# Patient Record
Sex: MALE | Race: WHITE | NOT HISPANIC OR LATINO | URBAN - METROPOLITAN AREA
[De-identification: names, ages, dates, MRNs, and addresses within clinical notes are randomized per-mention and may not be internally consistent; named-entity substitution may affect disease eponyms.]

---

## 2017-01-28 ENCOUNTER — EMERGENCY (EMERGENCY)
Facility: HOSPITAL | Age: 82
LOS: 1 days | Discharge: HOME | End: 2017-01-28
Attending: EMERGENCY MEDICINE | Admitting: EMERGENCY MEDICINE
Payer: COMMERCIAL

## 2017-01-28 VITALS
SYSTOLIC BLOOD PRESSURE: 186 MMHG | TEMPERATURE: 98 F | DIASTOLIC BLOOD PRESSURE: 94 MMHG | HEART RATE: 78 BPM | RESPIRATION RATE: 16 BRPM

## 2017-01-28 VITALS
SYSTOLIC BLOOD PRESSURE: 151 MMHG | HEIGHT: 70 IN | DIASTOLIC BLOOD PRESSURE: 76 MMHG | WEIGHT: 179.9 LBS | TEMPERATURE: 98 F | HEART RATE: 78 BPM | RESPIRATION RATE: 16 BRPM | OXYGEN SATURATION: 98 %

## 2017-01-28 DIAGNOSIS — I10 ESSENTIAL (PRIMARY) HYPERTENSION: ICD-10-CM
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
## 2017-01-28 DIAGNOSIS — S20.219A CONTUSION OF UNSPECIFIED FRONT WALL OF THORAX, INITIAL ENCOUNTER: ICD-10-CM

## 2017-01-28 DIAGNOSIS — M50.30 OTHER CERVICAL DISC DEGENERATION, UNSPECIFIED CERVICAL REGION: ICD-10-CM

## 2017-01-28 DIAGNOSIS — H35.30 UNSPECIFIED MACULAR DEGENERATION: ICD-10-CM

## 2017-01-28 DIAGNOSIS — G20 PARKINSON'S DISEASE: ICD-10-CM

## 2017-01-28 PROCEDURE — 73502 X-RAY EXAM HIP UNI 2-3 VIEWS: CPT | Mod: 26

## 2017-01-28 PROCEDURE — 71250 CT THORAX DX C-: CPT | Mod: 26

## 2017-01-28 PROCEDURE — 99283 EMERGENCY DEPT VISIT LOW MDM: CPT

## 2017-01-28 PROCEDURE — 70450 CT HEAD/BRAIN W/O DYE: CPT | Mod: 26

## 2017-01-28 PROCEDURE — 72125 CT NECK SPINE W/O DYE: CPT | Mod: 26

## 2017-01-28 PROCEDURE — 73060 X-RAY EXAM OF HUMERUS: CPT | Mod: 26,LT

## 2017-01-28 RX ORDER — ACETAMINOPHEN 500 MG
650 TABLET ORAL ONCE
Qty: 0 | Refills: 0 | Status: COMPLETED | OUTPATIENT
Start: 2017-01-28 | End: 2017-01-28

## 2017-01-28 RX ORDER — CARBIDOPA AND LEVODOPA 25; 100 MG/1; MG/1
1 TABLET ORAL ONCE
Qty: 0 | Refills: 0 | Status: COMPLETED | OUTPATIENT
Start: 2017-01-28 | End: 2017-01-28

## 2017-01-28 RX ADMIN — Medication 650 MILLIGRAM(S): at 15:11

## 2017-01-28 RX ADMIN — CARBIDOPA AND LEVODOPA 1 TABLET(S): 25; 100 TABLET ORAL at 15:13

## 2017-01-28 NOTE — ED PROVIDER NOTE - OBJECTIVE STATEMENT
88 y/o M with Hx of Newinson's Dz  presents to ED for evaluation s/p fall. Family states pt was standing up from the table and fell from standing. Pt sustained a contusion on his head and shoulder which concerned his wife and prompted her to come to the ED. Pt is on ASA and plavix. Pt has no other acute complaints. Family reports pt had a similar fall 3 days ago but had no complaints and did not seek evaluation

## 2017-01-28 NOTE — ED PROVIDER NOTE - MEDICAL DECISION MAKING DETAILS
Pt is a 89 y/o M with Hx of parkinson's Dz s/p fall. Plan to CT head, neck, chest to r/o traumatic injury. Will give evening medications and tylenol for pain. If results are negative plan to D/C home Pt is a 88 y/o M with Hx of parkinson's Dz s/p fall. Plan to CT head, neck, chest, Pelvis XR to r/o traumatic injury. Will give evening medications and tylenol for pain. If results are negative plan to D/C home

## 2017-01-28 NOTE — ED ADULT NURSE REASSESSMENT NOTE - NS ED NURSE REASSESS COMMENT FT1
Trauma alert DC'd as charted on trauma flowsheet. Pt medicated per MD order. Pt brought to and returned from CT. Pt awaiting xray. Pt updated as to current plan of care. Family remains at pt bedside. Pt has no complaints at this time. Will continue to monitor.

## 2017-01-28 NOTE — ED PROVIDER NOTE - MUSCULOSKELETAL, MLM
Spine appears normal, range of motion is not limited. TTP over left shoulder and ribs Spine appears normal, range of motion is not limited. TTP over left shoulder and ribs. No midline CTL spine tenderness

## 2017-01-28 NOTE — ED ADULT NURSE NOTE - OBJECTIVE STATEMENT
Patient states fell 2 days ago and this morning.  Patient denies LOC.  Patient states 2 days ago he hit his head.  Patient denies hitting his head today.  Patient is A&O and answers all questions appropriately.  Patient only complaint is left arm pain.

## 2017-02-21 ENCOUNTER — INPATIENT (INPATIENT)
Facility: HOSPITAL | Age: 82
LOS: 3 days | Discharge: NURSING, REHAB OR LONG-TERM CARE | End: 2017-02-25
Attending: INTERNAL MEDICINE | Admitting: INTERNAL MEDICINE
Payer: COMMERCIAL

## 2017-02-21 VITALS
SYSTOLIC BLOOD PRESSURE: 155 MMHG | OXYGEN SATURATION: 100 % | RESPIRATION RATE: 18 BRPM | HEART RATE: 90 BPM | HEIGHT: 68 IN | WEIGHT: 164.91 LBS | DIASTOLIC BLOOD PRESSURE: 89 MMHG | TEMPERATURE: 98 F

## 2017-02-21 LAB
ALBUMIN SERPL ELPH-MCNC: 3.6 G/DL — SIGNIFICANT CHANGE UP (ref 3.3–5)
ALP SERPL-CCNC: 74 U/L — SIGNIFICANT CHANGE UP (ref 40–120)
ALT FLD-CCNC: 25 U/L — SIGNIFICANT CHANGE UP (ref 12–78)
ANION GAP SERPL CALC-SCNC: 6 MMOL/L — SIGNIFICANT CHANGE UP (ref 5–17)
AST SERPL-CCNC: 23 U/L — SIGNIFICANT CHANGE UP (ref 15–37)
BASOPHILS # BLD AUTO: 0.1 K/UL — SIGNIFICANT CHANGE UP (ref 0–0.2)
BASOPHILS NFR BLD AUTO: 1.4 % — SIGNIFICANT CHANGE UP (ref 0–2)
BILIRUB SERPL-MCNC: 0.4 MG/DL — SIGNIFICANT CHANGE UP (ref 0.2–1.2)
BUN SERPL-MCNC: 27 MG/DL — HIGH (ref 7–23)
CALCIUM SERPL-MCNC: 9.1 MG/DL — SIGNIFICANT CHANGE UP (ref 8.5–10.1)
CHLORIDE SERPL-SCNC: 107 MMOL/L — SIGNIFICANT CHANGE UP (ref 96–108)
CK SERPL-CCNC: 78 U/L — SIGNIFICANT CHANGE UP (ref 26–308)
CO2 SERPL-SCNC: 30 MMOL/L — SIGNIFICANT CHANGE UP (ref 22–31)
CREAT SERPL-MCNC: 1.49 MG/DL — HIGH (ref 0.5–1.3)
EOSINOPHIL # BLD AUTO: 0.2 K/UL — SIGNIFICANT CHANGE UP (ref 0–0.5)
EOSINOPHIL NFR BLD AUTO: 2.3 % — SIGNIFICANT CHANGE UP (ref 0–6)
GLUCOSE SERPL-MCNC: 86 MG/DL — SIGNIFICANT CHANGE UP (ref 70–99)
HCT VFR BLD CALC: 39.4 % — SIGNIFICANT CHANGE UP (ref 39–50)
HGB BLD-MCNC: 13.3 G/DL — SIGNIFICANT CHANGE UP (ref 13–17)
LYMPHOCYTES # BLD AUTO: 1.3 K/UL — SIGNIFICANT CHANGE UP (ref 1–3.3)
LYMPHOCYTES # BLD AUTO: 16.4 % — SIGNIFICANT CHANGE UP (ref 13–44)
MCHC RBC-ENTMCNC: 31.2 PG — SIGNIFICANT CHANGE UP (ref 27–34)
MCHC RBC-ENTMCNC: 33.8 GM/DL — SIGNIFICANT CHANGE UP (ref 32–36)
MCV RBC AUTO: 92.2 FL — SIGNIFICANT CHANGE UP (ref 80–100)
MONOCYTES # BLD AUTO: 0.6 K/UL — SIGNIFICANT CHANGE UP (ref 0–0.9)
MONOCYTES NFR BLD AUTO: 6.8 % — SIGNIFICANT CHANGE UP (ref 2–14)
NEUTROPHILS # BLD AUTO: 6 K/UL — SIGNIFICANT CHANGE UP (ref 1.8–7.4)
NEUTROPHILS NFR BLD AUTO: 73.1 % — SIGNIFICANT CHANGE UP (ref 43–77)
NT-PROBNP SERPL-SCNC: 300 PG/ML — SIGNIFICANT CHANGE UP (ref 0–450)
PLATELET # BLD AUTO: 180 K/UL — SIGNIFICANT CHANGE UP (ref 150–400)
POTASSIUM SERPL-MCNC: 3.8 MMOL/L — SIGNIFICANT CHANGE UP (ref 3.5–5.3)
POTASSIUM SERPL-SCNC: 3.8 MMOL/L — SIGNIFICANT CHANGE UP (ref 3.5–5.3)
PROT SERPL-MCNC: 6.5 GM/DL — SIGNIFICANT CHANGE UP (ref 6–8.3)
RBC # BLD: 4.27 M/UL — SIGNIFICANT CHANGE UP (ref 4.2–5.8)
RBC # FLD: 13 % — SIGNIFICANT CHANGE UP (ref 10.3–14.5)
SODIUM SERPL-SCNC: 143 MMOL/L — SIGNIFICANT CHANGE UP (ref 135–145)
TROPONIN I SERPL-MCNC: 0.02 NG/ML — SIGNIFICANT CHANGE UP (ref 0.01–0.04)
TROPONIN I SERPL-MCNC: <0.015 NG/ML — SIGNIFICANT CHANGE UP (ref 0.01–0.04)
TROPONIN I SERPL-MCNC: <0.015 NG/ML — SIGNIFICANT CHANGE UP (ref 0.01–0.04)
WBC # BLD: 8.1 K/UL — SIGNIFICANT CHANGE UP (ref 3.8–10.5)
WBC # FLD AUTO: 8.1 K/UL — SIGNIFICANT CHANGE UP (ref 3.8–10.5)

## 2017-02-21 PROCEDURE — 99285 EMERGENCY DEPT VISIT HI MDM: CPT

## 2017-02-21 PROCEDURE — 71010: CPT | Mod: 26

## 2017-02-21 PROCEDURE — 93010 ELECTROCARDIOGRAM REPORT: CPT

## 2017-02-21 RX ORDER — CARBIDOPA AND LEVODOPA 25; 100 MG/1; MG/1
0 TABLET ORAL
Qty: 0 | Refills: 0 | COMMUNITY

## 2017-02-21 RX ORDER — MESALAMINE 400 MG
1200 TABLET, DELAYED RELEASE (ENTERIC COATED) ORAL DAILY
Qty: 0 | Refills: 0 | Status: DISCONTINUED | OUTPATIENT
Start: 2017-02-21 | End: 2017-02-25

## 2017-02-21 RX ORDER — SODIUM CHLORIDE 9 MG/ML
3 INJECTION INTRAMUSCULAR; INTRAVENOUS; SUBCUTANEOUS ONCE
Qty: 0 | Refills: 0 | Status: COMPLETED | OUTPATIENT
Start: 2017-02-21 | End: 2017-02-21

## 2017-02-21 RX ORDER — CARBIDOPA AND LEVODOPA 25; 100 MG/1; MG/1
1 TABLET ORAL ONCE
Qty: 0 | Refills: 0 | Status: COMPLETED | OUTPATIENT
Start: 2017-02-21 | End: 2017-02-21

## 2017-02-21 RX ORDER — HEPARIN SODIUM 5000 [USP'U]/ML
5000 INJECTION INTRAVENOUS; SUBCUTANEOUS EVERY 12 HOURS
Qty: 0 | Refills: 0 | Status: DISCONTINUED | OUTPATIENT
Start: 2017-02-21 | End: 2017-02-25

## 2017-02-21 RX ORDER — CHOLECALCIFEROL (VITAMIN D3) 125 MCG
3000 CAPSULE ORAL DAILY
Qty: 0 | Refills: 0 | Status: DISCONTINUED | OUTPATIENT
Start: 2017-02-21 | End: 2017-02-25

## 2017-02-21 RX ORDER — HYDRALAZINE HCL 50 MG
5 TABLET ORAL ONCE
Qty: 0 | Refills: 0 | Status: COMPLETED | OUTPATIENT
Start: 2017-02-21 | End: 2017-02-21

## 2017-02-21 RX ORDER — CLOPIDOGREL BISULFATE 75 MG/1
0 TABLET, FILM COATED ORAL
Qty: 0 | Refills: 0 | COMMUNITY

## 2017-02-21 RX ORDER — ASPIRIN/CALCIUM CARB/MAGNESIUM 324 MG
0 TABLET ORAL
Qty: 0 | Refills: 0 | COMMUNITY

## 2017-02-21 RX ORDER — METOPROLOL TARTRATE 50 MG
100 TABLET ORAL AT BEDTIME
Qty: 0 | Refills: 0 | Status: DISCONTINUED | OUTPATIENT
Start: 2017-02-21 | End: 2017-02-25

## 2017-02-21 RX ORDER — ATORVASTATIN CALCIUM 80 MG/1
0 TABLET, FILM COATED ORAL
Qty: 0 | Refills: 0 | COMMUNITY

## 2017-02-21 RX ORDER — CLOPIDOGREL BISULFATE 75 MG/1
75 TABLET, FILM COATED ORAL DAILY
Qty: 0 | Refills: 0 | Status: DISCONTINUED | OUTPATIENT
Start: 2017-02-21 | End: 2017-02-25

## 2017-02-21 RX ORDER — ACETAMINOPHEN 500 MG
650 TABLET ORAL EVERY 6 HOURS
Qty: 0 | Refills: 0 | Status: DISCONTINUED | OUTPATIENT
Start: 2017-02-21 | End: 2017-02-25

## 2017-02-21 RX ORDER — CARBIDOPA AND LEVODOPA 25; 100 MG/1; MG/1
1 TABLET ORAL THREE TIMES A DAY
Qty: 0 | Refills: 0 | Status: DISCONTINUED | OUTPATIENT
Start: 2017-02-21 | End: 2017-02-25

## 2017-02-21 RX ORDER — ASPIRIN/CALCIUM CARB/MAGNESIUM 324 MG
81 TABLET ORAL ONCE
Qty: 0 | Refills: 0 | Status: COMPLETED | OUTPATIENT
Start: 2017-02-21 | End: 2017-02-21

## 2017-02-21 RX ORDER — ASPIRIN/CALCIUM CARB/MAGNESIUM 324 MG
81 TABLET ORAL DAILY
Qty: 0 | Refills: 0 | Status: DISCONTINUED | OUTPATIENT
Start: 2017-02-21 | End: 2017-02-25

## 2017-02-21 RX ORDER — ATORVASTATIN CALCIUM 80 MG/1
10 TABLET, FILM COATED ORAL AT BEDTIME
Qty: 0 | Refills: 0 | Status: DISCONTINUED | OUTPATIENT
Start: 2017-02-21 | End: 2017-02-25

## 2017-02-21 RX ADMIN — Medication 100 MILLIGRAM(S): at 19:56

## 2017-02-21 RX ADMIN — Medication 81 MILLIGRAM(S): at 19:07

## 2017-02-21 RX ADMIN — Medication 5 MILLIGRAM(S): at 17:09

## 2017-02-21 RX ADMIN — SODIUM CHLORIDE 3 MILLILITER(S): 9 INJECTION INTRAMUSCULAR; INTRAVENOUS; SUBCUTANEOUS at 18:08

## 2017-02-21 RX ADMIN — CARBIDOPA AND LEVODOPA 1 TABLET(S): 25; 100 TABLET ORAL at 17:09

## 2017-02-21 NOTE — ED PROVIDER NOTE - SKIN, MLM
Skin normal color for race, warm, dry and intact. No evidence of rash. No tactile warmth, no rash, perianal dry small ulceration no d/c, no cellulitis, stage 2 persistent to decubitus pressure. .

## 2017-02-21 NOTE — ED ADULT NURSE NOTE - ED STAT RN HANDOFF DETAILS
Recvd care of pt at this time, pt A & O x 3, VSS, bed rails up, safety maintained, will continue to monitor.

## 2017-02-21 NOTE — ED PROVIDER NOTE - CHPI ED SYMPTOMS NEG
no nausea/no cough/no dizziness/no chills/no diaphoresis/no vomiting/no back pain/no fever/no syncope

## 2017-02-21 NOTE — H&P ADULT - HISTORY OF PRESENT ILLNESS
89 y.o. male with PMH CAD s/p PCI (2000, 2015 -YELENA to mLAD), SSS s/p PPM, Parkinson's dz, IBD, CKD stage 3/4 (Cr baseline 1.7 - 2) presents with ASHFORD and CP. Pt states was in a recliner when he tries to get up. With exertion, pt felt SOB and CP that's nonradiation, substernal, heaviness. While in the ED, pt had short episode of CP/SOB. Denies any fever, chills, abd pain, dysuria, or diarrhea.     PMH: as above  PSH: PPM  Social Hx: no tobacco, etoh, drugs  Family Hx: noncontrib to current presentation

## 2017-02-21 NOTE — ED PROVIDER NOTE - CARDIAC, MLM
Normal rate, regular rhythm.  Heart sounds S1, S2.  No murmurs, rubs or gallops. Normal radial pulse

## 2017-02-21 NOTE — ED PROVIDER NOTE - MEDICAL DECISION MAKING DETAILS
Elderly M, Parkinson's & CAD on Plavix, w/ exertional cp & dyspnea.  EKG, CXR, labs, O2 suppl. cardiac monitor, pulse oxim.  Tele adm.

## 2017-02-21 NOTE — ED PROVIDER NOTE - DETAILS:
The scribe's documentation has been prepared under my direction and personally reviewed by me in its entirety. I confirm that the note above accurately reflects all work, treatment, procedures, and medical decision making performed by me (Dr. Leslie).

## 2017-02-21 NOTE — H&P ADULT - NSHPLABSRESULTS_GEN_ALL_CORE
13.3   8.1   )-----------( 180      ( 21 Feb 2017 15:30 )             39.4     21 Feb 2017 15:30    143    |  107    |  27     ----------------------------<  86     3.8     |  30     |  1.49     Ca    9.1        21 Feb 2017 15:30    TPro  6.5    /  Alb  3.6    /  TBili  0.4    /  DBili  x      /  AST  23     /  ALT  25     /  AlkPhos  74     21 Feb 2017 15:30

## 2017-02-21 NOTE — ED PROVIDER NOTE - OBJECTIVE STATEMENT
88 y/o M PMHx Parkinson's, Pacemaker, presents to the ED c/o sob. The pt provides that when he woke up at about 5am he went to the bathroom, and started having dyspnea on exertion and had heavy chest pain which lasted for about 1.5 hours. No h/o abd pain, nvd, headache, fever, chills, cough, dizziness or urinary incontinence. Cards is Dr. Locke/ Dr. Chavis, 89 y/o M PMHx Parkinson's, Pacemaker, presents to the ED c/o sob. The pt provides that when he woke up at about 5am he went to the bathroom, and started having dyspnea on exertion and had heavy chest pain which lasted for about 1.5 hours. No h/o abd pain, nvd, headache, fever, chills, cough, dizziness or urinary incontinence. Cards is Dr. Locke/ Dr. EMILY Martel,  PCP: Roman.

## 2017-02-21 NOTE — ED PROVIDER NOTE - NS ED MD SCRIBE ATTENDING SCRIBE SECTIONS
DISPOSITION/HIV/CONSULTATIONS/SHIFT CHANGE/INTAKE ASSESSMENT/SCREENINGS/PAST MEDICAL/SURGICAL/SOCIAL HISTORY/PHYSICAL EXAM/REVIEW OF SYSTEMS/HISTORY OF PRESENT ILLNESS/RESULTS/PROGRESS NOTE

## 2017-02-21 NOTE — ED PROVIDER NOTE - DIAGNOSIS COUNSELING, MDM
conducted a detailed discussion... I had a detailed discussion with the patient and/or guardian regarding the historical points, exam findings, and any diagnostic results supporting the admit diagnosis.

## 2017-02-21 NOTE — H&P ADULT - ASSESSMENT
89 y.o. male with PMH CAD s/p PCI (2000, 2015 -YELENA to mLAD), SSS s/p PPM, Parkinson's dz, IBD, CKD stage 3/4 (Cr baseline 1.7 - 2) presents with ASHFORD and CP.     #chest pain, SOB  #abnormal EKG  #SSS s/p PPM  -admit to tele  -SHAI  -repeat EKG  -echo  -cont asa, statin, BB  -prn nitrostat  -cardio eval, Dr Martel  -EP eval, Dr Locke    #CAD s/p stent  -cardiac cath 11/20/15 with YELENA to mLAD, EF 60%  -cont asa, plavix, statin    #Parkinson's disease  -cont sinemet  -fall precaution    #IBD  -on mesalamine    #CKD 3/4 (Cr baseline 1.7-2)  -currently Cr 1.49  -avoid nephrotoxic meds 89 y.o. male with PMH CAD s/p PCI (2000, 2015 -YELENA to mLAD), SSS s/p PPM, Parkinson's dz, IBD, CKD stage 3/4 (Cr baseline 1.7 - 2) presents with ASHFORD and CP.     #chest pain, SOB  #abnormal EKG  #SSS s/p PPM  -admit to tele  -SHAI  -repeat EKG  -echo  -cont asa, statin, BB  -prn nitrostat  -cardio eval, Dr Martel  -EP eval, Dr Locke    #CAD s/p stent  -cardiac cath 11/20/15 with YELENA to mLAD, EF 60%  -cont asa, plavix, statin    #Parkinson's disease  -cont sinemet  -fall precaution  -PT eval    #IBD  -on mesalamine    #CKD 3/4 (Cr baseline 1.7-2)  -currently Cr 1.49  -avoid nephrotoxic meds 89 y.o. male with PMH CAD s/p PCI (2000, 2015 -YELENA to mLAD), SSS s/p PPM, Parkinson's dz, IBD, CKD stage 3/4 (Cr baseline 1.7 - 2) presents with ASHFORD and CP.     #chest pain, SOB  #abnormal EKG  #SSS s/p PPM  -admit to tele  -SHAI  -repeat EKG  -echo  -cont asa, statin, BB  -prn nitrostat  -cardio eval, Dr Martel  -EP eval, Dr Locke    #CAD s/p stent  -cardiac cath 11/20/15 with YELENA to mLAD, EF 60%  -cont asa, plavix, statin    #Parkinson's disease  -cont sinemet  -fall precaution  -PT eval, SW eval    #IBD  -on mesalamine    #CKD 3/4 (Cr baseline 1.7-2)  -currently Cr 1.49  -avoid nephrotoxic meds

## 2017-02-21 NOTE — ED ADULT NURSE NOTE - OBJECTIVE STATEMENT
pt w/hx Parkinsons states he has had sob/difficulty breathing for "days", worsening today.  pt wife states he is only able to sleep on a recliner.

## 2017-02-21 NOTE — ED PROVIDER NOTE - CONSTITUTIONAL, MLM
normal... Elderly white male, nasal cannula in place no acute distress, Well appearing, well nourished, awake, alert, oriented to person, place, time/situation and in no apparent distress.

## 2017-02-22 LAB
ANION GAP SERPL CALC-SCNC: 11 MMOL/L — SIGNIFICANT CHANGE UP (ref 5–17)
BASOPHILS # BLD AUTO: 0.1 K/UL — SIGNIFICANT CHANGE UP (ref 0–0.2)
BASOPHILS NFR BLD AUTO: 1 % — SIGNIFICANT CHANGE UP (ref 0–2)
BUN SERPL-MCNC: 26 MG/DL — HIGH (ref 7–23)
CALCIUM SERPL-MCNC: 9.2 MG/DL — SIGNIFICANT CHANGE UP (ref 8.5–10.1)
CHLORIDE SERPL-SCNC: 110 MMOL/L — HIGH (ref 96–108)
CHOLEST SERPL-MCNC: 115 MG/DL — SIGNIFICANT CHANGE UP (ref 10–199)
CO2 SERPL-SCNC: 26 MMOL/L — SIGNIFICANT CHANGE UP (ref 22–31)
CREAT SERPL-MCNC: 1.43 MG/DL — HIGH (ref 0.5–1.3)
EOSINOPHIL # BLD AUTO: 0.2 K/UL — SIGNIFICANT CHANGE UP (ref 0–0.5)
EOSINOPHIL NFR BLD AUTO: 2 % — SIGNIFICANT CHANGE UP (ref 0–6)
GLUCOSE SERPL-MCNC: 112 MG/DL — HIGH (ref 70–99)
HCT VFR BLD CALC: 38.9 % — LOW (ref 39–50)
HDLC SERPL-MCNC: 48 MG/DL — SIGNIFICANT CHANGE UP (ref 40–125)
HGB BLD-MCNC: 12.7 G/DL — LOW (ref 13–17)
LIPID PNL WITH DIRECT LDL SERPL: 42 MG/DL — SIGNIFICANT CHANGE UP
LYMPHOCYTES # BLD AUTO: 1 K/UL — SIGNIFICANT CHANGE UP (ref 1–3.3)
LYMPHOCYTES # BLD AUTO: 11.5 % — LOW (ref 13–44)
MCHC RBC-ENTMCNC: 30.1 PG — SIGNIFICANT CHANGE UP (ref 27–34)
MCHC RBC-ENTMCNC: 32.7 GM/DL — SIGNIFICANT CHANGE UP (ref 32–36)
MCV RBC AUTO: 92.2 FL — SIGNIFICANT CHANGE UP (ref 80–100)
MONOCYTES # BLD AUTO: 0.8 K/UL — SIGNIFICANT CHANGE UP (ref 0–0.9)
MONOCYTES NFR BLD AUTO: 8.4 % — SIGNIFICANT CHANGE UP (ref 2–14)
NEUTROPHILS # BLD AUTO: 7 K/UL — SIGNIFICANT CHANGE UP (ref 1.8–7.4)
NEUTROPHILS NFR BLD AUTO: 77.1 % — HIGH (ref 43–77)
PLATELET # BLD AUTO: 180 K/UL — SIGNIFICANT CHANGE UP (ref 150–400)
POTASSIUM SERPL-MCNC: 3.6 MMOL/L — SIGNIFICANT CHANGE UP (ref 3.5–5.3)
POTASSIUM SERPL-SCNC: 3.6 MMOL/L — SIGNIFICANT CHANGE UP (ref 3.5–5.3)
RBC # BLD: 4.22 M/UL — SIGNIFICANT CHANGE UP (ref 4.2–5.8)
RBC # FLD: 13 % — SIGNIFICANT CHANGE UP (ref 10.3–14.5)
SODIUM SERPL-SCNC: 147 MMOL/L — HIGH (ref 135–145)
TOTAL CHOLESTEROL/HDL RATIO MEASUREMENT: 2.4 RATIO — LOW (ref 3.4–9.6)
TRIGL SERPL-MCNC: 124 MG/DL — SIGNIFICANT CHANGE UP (ref 10–149)
WBC # BLD: 9.1 K/UL — SIGNIFICANT CHANGE UP (ref 3.8–10.5)
WBC # FLD AUTO: 9.1 K/UL — SIGNIFICANT CHANGE UP (ref 3.8–10.5)

## 2017-02-22 PROCEDURE — 93306 TTE W/DOPPLER COMPLETE: CPT | Mod: 26

## 2017-02-22 PROCEDURE — 93010 ELECTROCARDIOGRAM REPORT: CPT

## 2017-02-22 RX ADMIN — Medication 100 MILLIGRAM(S): at 21:31

## 2017-02-22 RX ADMIN — ATORVASTATIN CALCIUM 10 MILLIGRAM(S): 80 TABLET, FILM COATED ORAL at 21:31

## 2017-02-22 RX ADMIN — CARBIDOPA AND LEVODOPA 1 TABLET(S): 25; 100 TABLET ORAL at 12:31

## 2017-02-22 RX ADMIN — CARBIDOPA AND LEVODOPA 1 TABLET(S): 25; 100 TABLET ORAL at 21:31

## 2017-02-22 RX ADMIN — Medication 81 MILLIGRAM(S): at 12:14

## 2017-02-22 RX ADMIN — CARBIDOPA AND LEVODOPA 1 TABLET(S): 25; 100 TABLET ORAL at 05:06

## 2017-02-22 RX ADMIN — Medication 3000 UNIT(S): at 12:14

## 2017-02-22 RX ADMIN — HEPARIN SODIUM 5000 UNIT(S): 5000 INJECTION INTRAVENOUS; SUBCUTANEOUS at 05:06

## 2017-02-22 RX ADMIN — ATORVASTATIN CALCIUM 10 MILLIGRAM(S): 80 TABLET, FILM COATED ORAL at 02:15

## 2017-02-22 RX ADMIN — HEPARIN SODIUM 5000 UNIT(S): 5000 INJECTION INTRAVENOUS; SUBCUTANEOUS at 17:34

## 2017-02-22 RX ADMIN — CLOPIDOGREL BISULFATE 75 MILLIGRAM(S): 75 TABLET, FILM COATED ORAL at 12:14

## 2017-02-22 RX ADMIN — CARBIDOPA AND LEVODOPA 1 TABLET(S): 25; 100 TABLET ORAL at 02:16

## 2017-02-22 RX ADMIN — Medication 1200 MILLIGRAM(S): at 12:15

## 2017-02-22 NOTE — CONSULT NOTE ADULT - SUBJECTIVE AND OBJECTIVE BOX
Patient is a 89y old  Male who presents with a chief complaint of chest pain, SOB (21 Feb 2017 19:13)        HPI:  89 y.o. male with PMH CAD s/p PCI (2000, 2015 -YELENA to mLAD), SSS s/p PPM, Parkinson's dz, IBD, CKD stage 3/4 (Cr baseline 1.7 - 2) presents with ASHFORD and CP. Pt states was in a recliner when he tries to get up. With exertion, pt felt SOB and CP that's nonradiation, substernal, heaviness. While in the ED, pt had short episode of CP/SOB. Denies any fever, chills, abd pain, dysuria, or diarrhea.     Above is hospitalist admitting data. See my dictated report for details including other PMH.  Has had increasing ASHFORD and at rest with episodic character and chest heaviness c/w angina. Relieved by sl NTG but not always the dyspnea promptly relieved by sl NTG. Rare PND and no orthopnea or edema.    Social Hx: no tobacco, etoh, drugs  Family Hx: noncontrib to current presentation (21 Feb 2017 19:13)      MEDICATIONS  (STANDING):  metoprolol succinate ER 100milliGRAM(s) Oral at bedtime  aspirin  chewable 81milliGRAM(s) Oral daily  clopidogrel Tablet 75milliGRAM(s) Oral daily  mesalamine DR Capsule 1200milliGRAM(s) Oral daily  atorvastatin 10milliGRAM(s) Oral at bedtime  carbidopa/levodopa  25/100 1Tablet(s) Oral three times a day  cholecalciferol 3000Unit(s) Oral daily  heparin  Injectable 5000Unit(s) SubCutaneous every 12 hours    MEDICATIONS  (PRN):  acetaminophen   Tablet 650milliGRAM(s) Oral every 6 hours PRN For Temp greater than 38 C (100.4 F)          PAST MEDICAL & SURGICAL HISTORY:  Parkinson disease  Diabetes  Macular degeneration  Renal failure  Hypertension   See dictated report on further details.                REVIEW OF SYSTEMS See dictated report.      General:	    Skin/Breast:  	  Ophthalmologic:  	  ENMT:	    Respiratory and Thorax:  	  Cardiovascular:	    Gastrointestinal:	    Genitourinary:	    Musculoskeletal:	    Neurological:	    Psychiatric:	    Hematology/Lymphatics:	    Endocrine:	    Allergic/Immunologic:	    Vital Signs Last 24 Hrs  T(C): 36.3, Max: 37.5 (02-21 @ 13:22)  T(F): 97.3, Max: 99.5 (02-21 @ 13:22)  HR: 67 (67 - 90)  BP: 104/58 (104/58 - 155/89)  BP(mean): --  RR: 16 (16 - 18)  SpO2: 97% (97% - 100%)    PHYSICAL EXAM: See dict report.      Constitutional:    Eyes:    ENMT:    Neck:    Breasts:    Back:    Respiratory:    Cardiovascular:    Gastrointestinal:    Genitourinary:    Rectal:    Extremities:    Vascular:    Neurological:    Skin:    Lymph Nodes:    Musculoskeletal:    Psychiatric:                INTERPRETATION OF TELEMETRY: SR    ECG: SR FDAVB RBBB LAHB NSSTT  T flat today.      LABS:                        12.7   9.1   )-----------( 180      ( 22 Feb 2017 05:31 )             38.9     22 Feb 2017 05:31    147    |  110    |  26     ----------------------------<  112    3.6     |  26     |  1.43         CARDIAC MARKERS ( 21 Feb 2017 19:57 )  <0.015 ng/mL / x     / x     / x     / x      CARDIAC MARKERS ( 21 Feb 2017 17:21 )  <0.015 ng/mL / x     / x     / x     / x      CARDIAC MARKERS ( 21 Feb 2017 15:30 )  0.016 ng/mL / x     / 78 U/L / x     / x              I&O's Summary    I & Os for current day (as of 22 Feb 2017 09:40)  =============================================  IN: 0 ml / OUT: 200 ml / NET: -200 ml    BNPSerum Pro-Brain Natriuretic Peptide: 300 pg/mL (02-21 @ 15:30)    RADIOLOGY & ADDITIONAL STUDIES:

## 2017-02-22 NOTE — CONSULT NOTE ADULT - ASSESSMENT
See dictated report.  Will rev echo data  May need consider cath and PCI as appropriate.  for now observation. SL NTG prn advised.  ECG with symptoms if they occur.  Will follow

## 2017-02-22 NOTE — ED ADULT NURSE REASSESSMENT NOTE - NS ED NURSE REASSESS COMMENT FT1
2/22/2017 00:15, pt c/o of chest tightness, non radiating, sub sternal, pt denies chest pain, MD NATALIE Jonathan made aware, will continue to monitor.

## 2017-02-23 LAB
ANION GAP SERPL CALC-SCNC: 10 MMOL/L — SIGNIFICANT CHANGE UP (ref 5–17)
BUN SERPL-MCNC: 38 MG/DL — HIGH (ref 7–23)
CALCIUM SERPL-MCNC: 9.2 MG/DL — SIGNIFICANT CHANGE UP (ref 8.5–10.1)
CHLORIDE SERPL-SCNC: 109 MMOL/L — HIGH (ref 96–108)
CO2 SERPL-SCNC: 26 MMOL/L — SIGNIFICANT CHANGE UP (ref 22–31)
CREAT SERPL-MCNC: 1.62 MG/DL — HIGH (ref 0.5–1.3)
GLUCOSE SERPL-MCNC: 110 MG/DL — HIGH (ref 70–99)
POTASSIUM SERPL-MCNC: 3.7 MMOL/L — SIGNIFICANT CHANGE UP (ref 3.5–5.3)
POTASSIUM SERPL-SCNC: 3.7 MMOL/L — SIGNIFICANT CHANGE UP (ref 3.5–5.3)
SODIUM SERPL-SCNC: 145 MMOL/L — SIGNIFICANT CHANGE UP (ref 135–145)

## 2017-02-23 PROCEDURE — 93010 ELECTROCARDIOGRAM REPORT: CPT

## 2017-02-23 RX ADMIN — Medication 3000 UNIT(S): at 12:19

## 2017-02-23 RX ADMIN — CARBIDOPA AND LEVODOPA 1 TABLET(S): 25; 100 TABLET ORAL at 21:11

## 2017-02-23 RX ADMIN — CARBIDOPA AND LEVODOPA 1 TABLET(S): 25; 100 TABLET ORAL at 05:29

## 2017-02-23 RX ADMIN — Medication 1200 MILLIGRAM(S): at 12:19

## 2017-02-23 RX ADMIN — Medication 650 MILLIGRAM(S): at 20:53

## 2017-02-23 RX ADMIN — ATORVASTATIN CALCIUM 10 MILLIGRAM(S): 80 TABLET, FILM COATED ORAL at 21:11

## 2017-02-23 RX ADMIN — CLOPIDOGREL BISULFATE 75 MILLIGRAM(S): 75 TABLET, FILM COATED ORAL at 12:19

## 2017-02-23 RX ADMIN — Medication 100 MILLIGRAM(S): at 21:11

## 2017-02-23 RX ADMIN — CARBIDOPA AND LEVODOPA 1 TABLET(S): 25; 100 TABLET ORAL at 12:18

## 2017-02-23 RX ADMIN — Medication 81 MILLIGRAM(S): at 12:18

## 2017-02-23 RX ADMIN — HEPARIN SODIUM 5000 UNIT(S): 5000 INJECTION INTRAVENOUS; SUBCUTANEOUS at 05:28

## 2017-02-23 RX ADMIN — HEPARIN SODIUM 5000 UNIT(S): 5000 INJECTION INTRAVENOUS; SUBCUTANEOUS at 18:15

## 2017-02-23 NOTE — PHYSICAL THERAPY INITIAL EVALUATION ADULT - MODALITIES TREATMENT COMMENTS
pt left seated in chair post Eval; chair alarm donned; HM, pulse oxym in place; callbell in reach; pt instructed not to get up alone; call nursing for assist; jose well; pt denied pain post Eval; FELICIANO Ballard made aware pt OOB

## 2017-02-23 NOTE — CDI QUERY NOTE - NSCDIOTHERTXTBX_GEN_ALL_CORE_HH
Chest pain, SOB has been documented.    Can you please clarify when known underlying etiology of  chest pain, SOB.    If so, please document so all diagnoses are reflected in record.  Thank you,

## 2017-02-24 PROCEDURE — 93016 CV STRESS TEST SUPVJ ONLY: CPT

## 2017-02-24 PROCEDURE — 93018 CV STRESS TEST I&R ONLY: CPT

## 2017-02-24 PROCEDURE — 78452 HT MUSCLE IMAGE SPECT MULT: CPT | Mod: 26

## 2017-02-24 PROCEDURE — 93010 ELECTROCARDIOGRAM REPORT: CPT

## 2017-02-24 RX ADMIN — CARBIDOPA AND LEVODOPA 1 TABLET(S): 25; 100 TABLET ORAL at 21:35

## 2017-02-24 RX ADMIN — Medication 3000 UNIT(S): at 12:44

## 2017-02-24 RX ADMIN — Medication 1200 MILLIGRAM(S): at 12:44

## 2017-02-24 RX ADMIN — Medication 81 MILLIGRAM(S): at 12:44

## 2017-02-24 RX ADMIN — ATORVASTATIN CALCIUM 10 MILLIGRAM(S): 80 TABLET, FILM COATED ORAL at 21:35

## 2017-02-24 RX ADMIN — Medication 650 MILLIGRAM(S): at 22:31

## 2017-02-24 RX ADMIN — Medication 100 MILLIGRAM(S): at 21:35

## 2017-02-24 RX ADMIN — HEPARIN SODIUM 5000 UNIT(S): 5000 INJECTION INTRAVENOUS; SUBCUTANEOUS at 05:17

## 2017-02-24 RX ADMIN — CARBIDOPA AND LEVODOPA 1 TABLET(S): 25; 100 TABLET ORAL at 14:15

## 2017-02-24 RX ADMIN — CLOPIDOGREL BISULFATE 75 MILLIGRAM(S): 75 TABLET, FILM COATED ORAL at 12:44

## 2017-02-24 RX ADMIN — HEPARIN SODIUM 5000 UNIT(S): 5000 INJECTION INTRAVENOUS; SUBCUTANEOUS at 17:53

## 2017-02-24 RX ADMIN — CARBIDOPA AND LEVODOPA 1 TABLET(S): 25; 100 TABLET ORAL at 05:17

## 2017-02-25 ENCOUNTER — TRANSCRIPTION ENCOUNTER (OUTPATIENT)
Age: 82
End: 2017-02-25

## 2017-02-25 VITALS
HEART RATE: 69 BPM | RESPIRATION RATE: 18 BRPM | DIASTOLIC BLOOD PRESSURE: 61 MMHG | TEMPERATURE: 99 F | OXYGEN SATURATION: 98 % | SYSTOLIC BLOOD PRESSURE: 149 MMHG

## 2017-02-25 LAB
ANION GAP SERPL CALC-SCNC: 9 MMOL/L — SIGNIFICANT CHANGE UP (ref 5–17)
BUN SERPL-MCNC: 41 MG/DL — HIGH (ref 7–23)
CALCIUM SERPL-MCNC: 9.2 MG/DL — SIGNIFICANT CHANGE UP (ref 8.5–10.1)
CHLORIDE SERPL-SCNC: 111 MMOL/L — HIGH (ref 96–108)
CO2 SERPL-SCNC: 27 MMOL/L — SIGNIFICANT CHANGE UP (ref 22–31)
CREAT SERPL-MCNC: 1.54 MG/DL — HIGH (ref 0.5–1.3)
GLUCOSE SERPL-MCNC: 112 MG/DL — HIGH (ref 70–99)
POTASSIUM SERPL-MCNC: 3.6 MMOL/L — SIGNIFICANT CHANGE UP (ref 3.5–5.3)
POTASSIUM SERPL-SCNC: 3.6 MMOL/L — SIGNIFICANT CHANGE UP (ref 3.5–5.3)
SODIUM SERPL-SCNC: 147 MMOL/L — HIGH (ref 135–145)

## 2017-02-25 PROCEDURE — 93010 ELECTROCARDIOGRAM REPORT: CPT

## 2017-02-25 RX ORDER — HEPARIN SODIUM 5000 [USP'U]/ML
5000 INJECTION INTRAVENOUS; SUBCUTANEOUS
Qty: 0 | Refills: 0 | COMMUNITY
Start: 2017-02-25

## 2017-02-25 RX ORDER — ACETAMINOPHEN 500 MG
2 TABLET ORAL
Qty: 0 | Refills: 0 | COMMUNITY
Start: 2017-02-25

## 2017-02-25 RX ADMIN — Medication 3000 UNIT(S): at 11:56

## 2017-02-25 RX ADMIN — CARBIDOPA AND LEVODOPA 1 TABLET(S): 25; 100 TABLET ORAL at 11:57

## 2017-02-25 RX ADMIN — Medication 1200 MILLIGRAM(S): at 11:56

## 2017-02-25 RX ADMIN — HEPARIN SODIUM 5000 UNIT(S): 5000 INJECTION INTRAVENOUS; SUBCUTANEOUS at 05:52

## 2017-02-25 RX ADMIN — CLOPIDOGREL BISULFATE 75 MILLIGRAM(S): 75 TABLET, FILM COATED ORAL at 11:56

## 2017-02-25 RX ADMIN — CARBIDOPA AND LEVODOPA 1 TABLET(S): 25; 100 TABLET ORAL at 05:52

## 2017-02-25 RX ADMIN — Medication 81 MILLIGRAM(S): at 11:55

## 2017-02-25 NOTE — DISCHARGE NOTE ADULT - PATIENT PORTAL LINK FT
“You can access the [facility] Patient Portal, offered by [facility], by registering with the following website: [email or web address]”

## 2017-02-25 NOTE — DISCHARGE NOTE ADULT - CARE PLAN
Principal Discharge DX:	Exertional chest pain  Goal:	Continue all cardiac meds,  Instructions for follow-up, activity and diet:	Follow up with DR Martel in 1 week time. 2gm Na diet  Secondary Diagnosis:	Essential hypertension  Goal:	Continue toprol XL  Instructions for follow-up, activity and diet:	2 gm Na diet  Secondary Diagnosis:	Parkinson disease  Goal:	Continue sinemet  Secondary Diagnosis:	Renal failure  Goal:	Follow BMP in 1 week time

## 2017-02-25 NOTE — PROGRESS NOTE ADULT - ASSESSMENT
89 y.o. male with PMH CAD s/p PCI (2000, 2015 -YELENA to mLAD), SSS s/p PPM, Parkinson's dz, IBD, CKD stage 3/4 (Cr baseline 1.7 - 2) presents with ASHFORD and CP.     #chest pain, SOB  #abnormal EKG  #SSS s/p PPM  -Follow echo report  -cont asa, statin, BB  -prn nitrostat  -cardio eval, Dr Martel appreciated  -EP eval, Dr Locke.  -May need cardiac cath as per Dr Martel.    #CAD s/p stent  -cardiac cath 11/20/15 with YELENA to mLAD, EF 60%  -cont asa, plavix, statin    #Parkinson's disease  -cont sinemet  -fall precaution  -PT eval, SW eval    #IBD  -on mesalamine    #CKD 3/4 (Cr baseline 1.7-2)  -Stable  -avoid nephrotoxic meds
A: Cause of symptoms unknown. Negative P-MPI for ischemia.  Creatinine 1.54 now and K  3.6  ASHD: old IWMI. Not cause for symptoms.  Since symptoms brief and self-limited best leave alone. Cannot rec treatment if cause not defined. Not related to ischemia or CHF.  Trace MR and LVH and decreased LV compliance but since activity so limited doubt diastolic dysfunction causes the ASHFORD. Maybe related to Cr elevation and IV volume but no clinical CHF.  BP ok and PPM function ok and no arrhythmias.    Plan: No changes in CV Tx except NTG sl not likely to help symptoms.  Rehab and PCP FU.  If Sx self limited then leave alone.  Same CVD treatment except for SL ntg which does not seem to help.  Discussed symptoms and P-MPI results and advice to leave alone if self limited. Discussed activity CVD meds and symptoms to merit ER or medical attention from CVD point of view.  FU my office for next exam is already scheduled. FU PCP after rehab advised. Rehab today.
89 y.o. male with PMH CAD s/p PCI (2000, 2015 -YELENA to mLAD), SSS s/p PPM, Parkinson's dz, IBD, CKD stage 3/4 (Cr baseline 1.7 - 2) presents with ASHFORD and CP.     #chest pain, SOB possibly due to cardiac  #abnormal EKG  #SSS s/p PPM  -Follow echo report  -cont asa, statin, BB  -cardio eval, Dr Martel appreciated  -EP eval, Dr Locke.  -Possible nuclear stress test in am.    #CAD s/p stent  -cardiac cath 11/20/15 with YELENA to mLAD, EF 60%  -cont asa, plavix, statin    #Parkinson's disease  -cont sinemet  -fall precaution  -PT eval appreciated    #IBD  -on mesalamine    #CKD 3/4 (Cr baseline 1.7-2)  -Stable  -avoid nephrotoxic meds
89 y.o. male with PMH CAD s/p PCI (2000, 2015 -YELENA to mLAD), SSS s/p PPM, Parkinson's dz, IBD, CKD stage 3/4 (Cr baseline 1.7 - 2) presents with ASHFORD and CP.     #chest pain, SOB possibly due to cardiac  #abnormal EKG  #SSS s/p PPM  -Follow echo report: normal EF  -cont asa, statin, BB  -cardio eval, Dr Martel appreciated  -nuclear stress test: no reversible myocardial ischemia, no change from previous stress test in 2015  -Medical management    #CAD s/p stent  -cardiac cath 11/20/15 with YELENA to mLAD, EF 60%  -cont asa, plavix, statin    #Parkinson's disease  -cont sinemet  -fall precaution  -PT eval appreciated    #IBD  -on mesalamine    #CKD 3/4 (Cr baseline 1.7-2)  -Stable  -avoid nephrotoxic meds.    # D/C planning-possibly to rehab tomorrow.
A: Atypical pattern to CPa nd Dyspnea under evaluation.  Has CAD and no acute event. If ACS confirmed by P-MPI showing ischemia then would need cath but Cr is higher and renal risk is present. Use of medical management if symptoms from ischemia has not worked so far.  MR is minimal. LVEF was normal on echo report. I need to review images. diastolic dysfunction.  PPM function is intact. BP is ok.  P; Finish P-MPI. If no ischemia maybe analgesics for pain and discuss putting up with these episodes of "SOB"   If ischemia consider possible cath and PCI as a quality of life measure. Will wait and see before making further decisions.  Stay on monitor. Management discussed.

## 2017-02-25 NOTE — DISCHARGE NOTE ADULT - PLAN OF CARE
Continue all cardiac meds, Follow up with DR Martel in 1 week time. 2gm Na diet Continue toprol XL 2 gm Na diet Continue sinemet Follow BMP in 1 week time

## 2017-02-25 NOTE — DISCHARGE NOTE ADULT - HOSPITAL COURSE
Patient is a 89y old  Male who presents with a chief complaint of chest pain, SOB (21 Feb 2017 19:13)        HPI:  89 y.o. male with PMH CAD s/p PCI (2000, 2015 -YELENA to mLAD), SSS s/p PPM, Parkinson's dz, IBD, CKD stage 3/4 (Cr baseline 1.7 - 2) presents with ASHFORD and CP. Pt states was in a recliner when he tries to get up. With exertion, pt felt SOB and CP that's nonradiation, substernal, heaviness. While in the ED, pt had short episode of CP/SOB. Denies any fever, chills, abd pain, dysuria, or diarrhea.       Review of system- Rest of the review of system are normal excpet mentioned in HPI    SUBJECTIVE & OBJECTIVE:  No new complaint. He denies any chest pain today. Discussed with patient regarding management and d/c plan. Possible nuclear stress test im am.   02/24/17: Patient seen and examined. Had nuclear stress test today. He denies any new complaints. Discussed with patient and wife regarding management and d/c plan.   02/25/17: Patient seen and examined. No chest pain or sob.               Vital Signs Last 24 Hrs  T(C): 36.4, Max: 36.4 (02-24 @ 20:11)  T(F): 97.6, Max: 97.6 (02-24 @ 20:11)  HR: 83 (75 - 83)  BP: 160/63 (148/70 - 160/63)  BP(mean): --  RR: 18 (18 - 18)  SpO2: 95% (95% - 95%)    Physical exam  GENERAL: NAD,  HEAD:  Atraumatic, Normocephalic  EYES: EOMI, PERRLA, conjunctiva and sclera clear  ENMT: Moist mucous membranes  NECK: Supple, No JVD  NERVOUS SYSTEM:  Alert & Oriented X3, occassional tremors both hands  CHEST/LUNG: Clear to auscultation bilaterally; No rales, rhonchi, wheezing, or rubs  HEART: Regular rate and rhythm; No murmurs, rubs, or gallops  ABDOMEN: Soft, Nontender, Nondistended; Bowel sounds present  EXTREMITIES:  2+ Peripheral Pulses,   CNS- alert, oriented X3, non focal                               Assessment and Plan:   · Assessment		  89 y.o. male with PMH CAD s/p PCI (2000, 2015 -YELENA to mLAD), SSS s/p PPM, Parkinson's dz, IBD, CKD stage 3/4 (Cr baseline 1.7 - 2) presents with ASHFORD and CP.     #chest pain, SOB possibly due to cardiac  #abnormal EKG  #SSS s/p PPM  -Follow echo report: normal EF  -cont asa, statin, BB  -cardio eval, Dr Martel appreciated  -nuclear stress test: no reversible myocardial ischemia, no change from previous stress test in 2015  -Medical management    #CAD s/p stent  -cardiac cath 11/20/15 with YELENA to mLAD, EF 60%  -cont asa, plavix, statin    #Parkinson's disease  -cont sinemet  -fall precaution  -PT eval appreciated    #IBD  -on mesalamine    #CKD 3/4 (Cr baseline 1.7-2)  -Stable  -avoid nephrotoxic meds.    # D/C planning-rehab today.   Spent more than 30 minutes to prepare the discharge.

## 2017-02-25 NOTE — DISCHARGE NOTE ADULT - CARE PROVIDER_API CALL
Paresh Owens), Medicine  [address] [place] [address]  Phone: [phone]  Fax: [phone]    Kamran Martel), Cardiovascular Disease; Critical Care Medicine; Internal Medicine  [address] [address]  Phone: [phone]  Fax: [phone]

## 2017-02-25 NOTE — DISCHARGE NOTE ADULT - MEDICATION SUMMARY - MEDICATIONS TO TAKE
I will START or STAY ON the medications listed below when I get home from the hospital:    Lialda 1.2 g oral delayed release tablet  -- 1 tab(s) by mouth once a day  -- Indication: For CD    aspirin 81 mg oral tablet, chewable  -- 1 tab(s) by mouth once a day  -- Indication: For EXERTIONAL CHEST PAIN, EXERTIONAL DYSPNEA    acetaminophen 325 mg oral tablet  -- 2 tab(s) by mouth every 6 hours, As needed, For Temp greater than 38 C (100.4 F)  -- Indication: For prn fever    Nitrostat 0.4 mg sublingual tablet  -- 1 tab(s) under tongue every 5 minutes, As Needed  -- Indication: For EXERTIONAL CHEST PAIN, EXERTIONAL DYSPNEA    heparin  -- 5000 unit(s) subcutaneous every 12 hours  -- Indication: For DVT prophylaxis    Lipitor 10 mg oral tablet  -- 1 tab(s) by mouth once a day  -- Indication: For CAD    Uloric 40 mg oral tablet  -- 1 tab(s) by mouth once a day  -- Indication: For hyperurecemia    Sinemet 25 mg-100 mg oral tablet  -- 1 tab(s) by mouth 3 times a day  -- Indication: For Parkinson    Plavix 75 mg oral tablet  -- 1 tab(s) by mouth once a day  -- Indication: For CAD    Toprol- mg oral tablet, extended release  -- 1 tab(s) by mouth once a day  -- Indication: For HTN    Vitamin D3 1000 intl units oral tablet  -- 3 tab(s) by mouth once a day  -- Indication: For supplement    folic acid 0.4 mg oral tablet  -- 1 tab(s) by mouth once a day  -- Indication: For supplement

## 2017-02-28 ENCOUNTER — EMERGENCY (EMERGENCY)
Facility: HOSPITAL | Age: 82
LOS: 0 days | Discharge: HOME | End: 2017-02-28
Attending: EMERGENCY MEDICINE | Admitting: EMERGENCY MEDICINE
Payer: COMMERCIAL

## 2017-02-28 VITALS
SYSTOLIC BLOOD PRESSURE: 154 MMHG | DIASTOLIC BLOOD PRESSURE: 65 MMHG | OXYGEN SATURATION: 97 % | HEART RATE: 69 BPM | RESPIRATION RATE: 18 BRPM | TEMPERATURE: 97 F

## 2017-02-28 VITALS
HEART RATE: 91 BPM | OXYGEN SATURATION: 99 % | DIASTOLIC BLOOD PRESSURE: 83 MMHG | RESPIRATION RATE: 18 BRPM | SYSTOLIC BLOOD PRESSURE: 157 MMHG | HEIGHT: 68 IN | TEMPERATURE: 98 F | WEIGHT: 164.91 LBS

## 2017-02-28 DIAGNOSIS — M79.604 PAIN IN RIGHT LEG: ICD-10-CM

## 2017-02-28 DIAGNOSIS — R07.9 CHEST PAIN, UNSPECIFIED: ICD-10-CM

## 2017-02-28 DIAGNOSIS — G20 PARKINSON'S DISEASE: ICD-10-CM

## 2017-02-28 DIAGNOSIS — M79.605 PAIN IN LEFT LEG: ICD-10-CM

## 2017-02-28 DIAGNOSIS — Z79.82 LONG TERM (CURRENT) USE OF ASPIRIN: ICD-10-CM

## 2017-02-28 DIAGNOSIS — M25.511 PAIN IN RIGHT SHOULDER: ICD-10-CM

## 2017-02-28 DIAGNOSIS — M25.512 PAIN IN LEFT SHOULDER: ICD-10-CM

## 2017-02-28 DIAGNOSIS — M54.9 DORSALGIA, UNSPECIFIED: ICD-10-CM

## 2017-02-28 LAB
ALBUMIN SERPL ELPH-MCNC: 3.4 G/DL — SIGNIFICANT CHANGE UP (ref 3.3–5)
ALP SERPL-CCNC: 69 U/L — SIGNIFICANT CHANGE UP (ref 40–120)
ALT FLD-CCNC: 32 U/L — SIGNIFICANT CHANGE UP (ref 12–78)
ANION GAP SERPL CALC-SCNC: 8 MMOL/L — SIGNIFICANT CHANGE UP (ref 5–17)
AST SERPL-CCNC: 54 U/L — HIGH (ref 15–37)
BASOPHILS # BLD AUTO: 0.1 K/UL — SIGNIFICANT CHANGE UP (ref 0–0.2)
BASOPHILS NFR BLD AUTO: 1.1 % — SIGNIFICANT CHANGE UP (ref 0–2)
BILIRUB SERPL-MCNC: 0.6 MG/DL — SIGNIFICANT CHANGE UP (ref 0.2–1.2)
BUN SERPL-MCNC: 35 MG/DL — HIGH (ref 7–23)
CALCIUM SERPL-MCNC: 9.2 MG/DL — SIGNIFICANT CHANGE UP (ref 8.5–10.1)
CHLORIDE SERPL-SCNC: 110 MMOL/L — HIGH (ref 96–108)
CK SERPL-CCNC: 110 U/L — SIGNIFICANT CHANGE UP (ref 26–308)
CO2 SERPL-SCNC: 26 MMOL/L — SIGNIFICANT CHANGE UP (ref 22–31)
CREAT SERPL-MCNC: 1.61 MG/DL — HIGH (ref 0.5–1.3)
EOSINOPHIL # BLD AUTO: 0.1 K/UL — SIGNIFICANT CHANGE UP (ref 0–0.5)
EOSINOPHIL NFR BLD AUTO: 1.7 % — SIGNIFICANT CHANGE UP (ref 0–6)
GLUCOSE SERPL-MCNC: 111 MG/DL — HIGH (ref 70–99)
HCT VFR BLD CALC: 41.1 % — SIGNIFICANT CHANGE UP (ref 39–50)
HGB BLD-MCNC: 13.6 G/DL — SIGNIFICANT CHANGE UP (ref 13–17)
INR BLD: 1.07 RATIO — SIGNIFICANT CHANGE UP (ref 0.88–1.16)
LYMPHOCYTES # BLD AUTO: 1.1 K/UL — SIGNIFICANT CHANGE UP (ref 1–3.3)
LYMPHOCYTES # BLD AUTO: 15.9 % — SIGNIFICANT CHANGE UP (ref 13–44)
MAGNESIUM SERPL-MCNC: 2.1 MG/DL — SIGNIFICANT CHANGE UP (ref 1.8–2.4)
MCHC RBC-ENTMCNC: 30.2 PG — SIGNIFICANT CHANGE UP (ref 27–34)
MCHC RBC-ENTMCNC: 33 GM/DL — SIGNIFICANT CHANGE UP (ref 32–36)
MCV RBC AUTO: 91.6 FL — SIGNIFICANT CHANGE UP (ref 80–100)
MONOCYTES # BLD AUTO: 0.4 K/UL — SIGNIFICANT CHANGE UP (ref 0–0.9)
MONOCYTES NFR BLD AUTO: 6.3 % — SIGNIFICANT CHANGE UP (ref 2–14)
NEUTROPHILS # BLD AUTO: 5.3 K/UL — SIGNIFICANT CHANGE UP (ref 1.8–7.4)
NEUTROPHILS NFR BLD AUTO: 74.9 % — SIGNIFICANT CHANGE UP (ref 43–77)
PHOSPHATE SERPL-MCNC: 2.8 MG/DL — SIGNIFICANT CHANGE UP (ref 2.5–4.5)
PLATELET # BLD AUTO: 167 K/UL — SIGNIFICANT CHANGE UP (ref 150–400)
POTASSIUM SERPL-MCNC: 4.6 MMOL/L — SIGNIFICANT CHANGE UP (ref 3.5–5.3)
POTASSIUM SERPL-SCNC: 4.6 MMOL/L — SIGNIFICANT CHANGE UP (ref 3.5–5.3)
PROT SERPL-MCNC: 6.7 GM/DL — SIGNIFICANT CHANGE UP (ref 6–8.3)
PROTHROM AB SERPL-ACNC: 11.8 SEC — SIGNIFICANT CHANGE UP (ref 10–13.1)
RBC # BLD: 4.49 M/UL — SIGNIFICANT CHANGE UP (ref 4.2–5.8)
RBC # FLD: 12.8 % — SIGNIFICANT CHANGE UP (ref 10.3–14.5)
SODIUM SERPL-SCNC: 144 MMOL/L — SIGNIFICANT CHANGE UP (ref 135–145)
TROPONIN I SERPL-MCNC: <0.015 NG/ML — SIGNIFICANT CHANGE UP (ref 0.01–0.04)
TROPONIN I SERPL-MCNC: <0.015 NG/ML — SIGNIFICANT CHANGE UP (ref 0.01–0.04)
WBC # BLD: 7.1 K/UL — SIGNIFICANT CHANGE UP (ref 3.8–10.5)
WBC # FLD AUTO: 7.1 K/UL — SIGNIFICANT CHANGE UP (ref 3.8–10.5)

## 2017-02-28 PROCEDURE — 93010 ELECTROCARDIOGRAM REPORT: CPT

## 2017-02-28 PROCEDURE — 99285 EMERGENCY DEPT VISIT HI MDM: CPT | Mod: 25

## 2017-02-28 PROCEDURE — 71010: CPT | Mod: 26

## 2017-02-28 RX ORDER — ASPIRIN/CALCIUM CARB/MAGNESIUM 324 MG
324 TABLET ORAL ONCE
Qty: 0 | Refills: 0 | Status: COMPLETED | OUTPATIENT
Start: 2017-02-28 | End: 2017-02-28

## 2017-02-28 RX ORDER — ACETAMINOPHEN 500 MG
650 TABLET ORAL ONCE
Qty: 0 | Refills: 0 | Status: COMPLETED | OUTPATIENT
Start: 2017-02-28 | End: 2017-02-28

## 2017-02-28 RX ORDER — ASPIRIN/CALCIUM CARB/MAGNESIUM 324 MG
81 TABLET ORAL DAILY
Qty: 0 | Refills: 0 | Status: DISCONTINUED | OUTPATIENT
Start: 2017-02-28 | End: 2017-02-28

## 2017-02-28 RX ORDER — CARBIDOPA AND LEVODOPA 25; 100 MG/1; MG/1
1 TABLET ORAL ONCE
Qty: 0 | Refills: 0 | Status: COMPLETED | OUTPATIENT
Start: 2017-02-28 | End: 2017-02-28

## 2017-02-28 RX ADMIN — Medication 324 MILLIGRAM(S): at 09:04

## 2017-02-28 RX ADMIN — Medication 650 MILLIGRAM(S): at 12:45

## 2017-02-28 RX ADMIN — Medication 650 MILLIGRAM(S): at 14:33

## 2017-02-28 RX ADMIN — CARBIDOPA AND LEVODOPA 1 TABLET(S): 25; 100 TABLET ORAL at 09:04

## 2017-02-28 NOTE — ED PROVIDER NOTE - OBJECTIVE STATEMENT
89 yo M CAD s/p PCI (2000, 2015 -YELENA to mLAD), SSS s/p PPM, Parkinson's dz, IBD, CKD stage 3/4 (Cr baseline 1.7 - 2) presents with ASHFORD and CP. 88 yo M CAD s/p PCI (2000, 2015 -YELENA to mLAD), SSS s/p PPM, Parkinson's dz, IBD, CKD stage 3/4 (Cr baseline 1.7 - 2) recent admission, presents with CC of pain.  Pt states symptoms began yesterday, after his 2 PM Sinemet was given late at around 4 PM.  He c/o bilateral leg pain, foot pain, bilateral shoulder pain, which he states is consistent with pain from his Parkinson's.  Pt also having following chest pain described as "hollow feeling in my chest".  Denies fever, chills, SOB, cough, abdominal pain, n/v/d 89 yo M CAD s/p PCI (2000, 2015 -YELENA to mLAD), SSS s/p PPM, Parkinson's dz, IBD, CKD stage 3/4 (Cr baseline 1.7 - 2) recent admission, presents with CC of pain.  Pt states symptoms began yesterday, after his 2 PM Sinemet was given late at around 4 PM.  He c/o bilateral leg pain, foot pain, bilateral shoulder pain, which he states is consistent with pain from his Parkinson's.  Pt also having following chest pain described as "hollow feeling in my chest".  Denies fever, chills, SOB, cough, abdominal pain, n/v/d, rash, or any other symptoms.  Pt with recent nuclear medicine stress on 2/24/17, which was negative for inducible ischemia.  PCP Dr. Grimm, Cardiology Dr. Martel.

## 2017-02-28 NOTE — ED PROVIDER NOTE - CARDIAC, MLM
Normal rate, regular rhythm.  Heart sounds S1, S2.  No murmurs, rubs or gallops.  PPM left chest, no surrounding erythema, fluctuance, no TTP.

## 2017-02-28 NOTE — ED PROVIDER NOTE - MEDICAL DECISION MAKING DETAILS
Pt with pain in body, likely related to Parkinson's.  Also nonspecific CP.  EKG WNL, Troponin X 2 undetectable.  Recent normal stress, unlikely cardiac pain.  Other Labs, and CXR WNL.  Pt given Sinemet, pain meds.  Spoke with SW as patient did not want d/c back to his SNF, arranged for new SNF placement.  Pt understands and agrees with plan.

## 2017-02-28 NOTE — ED PROVIDER NOTE - MUSCULOSKELETAL, MLM
Spine appears normal, range of motion is not limited, no muscle or joint tenderness in all four extremities.

## 2017-03-01 DIAGNOSIS — R07.89 OTHER CHEST PAIN: ICD-10-CM

## 2017-03-01 DIAGNOSIS — R94.31 ABNORMAL ELECTROCARDIOGRAM [ECG] [EKG]: ICD-10-CM

## 2017-03-01 DIAGNOSIS — G20 PARKINSON'S DISEASE: ICD-10-CM

## 2017-03-01 DIAGNOSIS — I49.5 SICK SINUS SYNDROME: ICD-10-CM

## 2017-03-01 DIAGNOSIS — Z95.5 PRESENCE OF CORONARY ANGIOPLASTY IMPLANT AND GRAFT: ICD-10-CM

## 2017-03-01 DIAGNOSIS — I10 ESSENTIAL (PRIMARY) HYPERTENSION: ICD-10-CM

## 2017-03-01 DIAGNOSIS — R07.9 CHEST PAIN, UNSPECIFIED: ICD-10-CM

## 2017-03-01 DIAGNOSIS — K52.3 INDETERMINATE COLITIS: ICD-10-CM

## 2017-03-01 DIAGNOSIS — N18.4 CHRONIC KIDNEY DISEASE, STAGE 4 (SEVERE): ICD-10-CM

## 2017-03-01 DIAGNOSIS — R06.02 SHORTNESS OF BREATH: ICD-10-CM

## 2017-03-01 DIAGNOSIS — Z95.0 PRESENCE OF CARDIAC PACEMAKER: ICD-10-CM

## 2017-03-01 DIAGNOSIS — I25.10 ATHEROSCLEROTIC HEART DISEASE OF NATIVE CORONARY ARTERY WITHOUT ANGINA PECTORIS: ICD-10-CM

## 2017-03-01 DIAGNOSIS — E11.9 TYPE 2 DIABETES MELLITUS WITHOUT COMPLICATIONS: ICD-10-CM

## 2017-04-10 ENCOUNTER — INPATIENT (INPATIENT)
Facility: HOSPITAL | Age: 82
LOS: 0 days | Discharge: NURSING, REHAB OR LONG-TERM CARE | End: 2017-04-11
Attending: INTERNAL MEDICINE | Admitting: FAMILY MEDICINE
Payer: COMMERCIAL

## 2017-04-10 VITALS
TEMPERATURE: 97 F | HEIGHT: 68 IN | WEIGHT: 160.06 LBS | SYSTOLIC BLOOD PRESSURE: 148 MMHG | HEART RATE: 88 BPM | DIASTOLIC BLOOD PRESSURE: 88 MMHG | RESPIRATION RATE: 16 BRPM | OXYGEN SATURATION: 96 %

## 2017-04-10 DIAGNOSIS — G20 PARKINSON'S DISEASE: ICD-10-CM

## 2017-04-10 DIAGNOSIS — G11.9 HEREDITARY ATAXIA, UNSPECIFIED: ICD-10-CM

## 2017-04-10 DIAGNOSIS — I10 ESSENTIAL (PRIMARY) HYPERTENSION: ICD-10-CM

## 2017-04-10 DIAGNOSIS — R29.6 REPEATED FALLS: ICD-10-CM

## 2017-04-10 DIAGNOSIS — E11.9 TYPE 2 DIABETES MELLITUS WITHOUT COMPLICATIONS: ICD-10-CM

## 2017-04-10 LAB
ALBUMIN SERPL ELPH-MCNC: 3 G/DL — LOW (ref 3.3–5)
ALP SERPL-CCNC: 93 U/L — SIGNIFICANT CHANGE UP (ref 40–120)
ALT FLD-CCNC: 12 U/L — SIGNIFICANT CHANGE UP (ref 12–78)
ANION GAP SERPL CALC-SCNC: 8 MMOL/L — SIGNIFICANT CHANGE UP (ref 5–17)
APTT BLD: 33.6 SEC — SIGNIFICANT CHANGE UP (ref 27.5–37.4)
AST SERPL-CCNC: 20 U/L — SIGNIFICANT CHANGE UP (ref 15–37)
BASOPHILS # BLD AUTO: 0.1 K/UL — SIGNIFICANT CHANGE UP (ref 0–0.2)
BASOPHILS NFR BLD AUTO: 1.2 % — SIGNIFICANT CHANGE UP (ref 0–2)
BILIRUB SERPL-MCNC: 0.2 MG/DL — SIGNIFICANT CHANGE UP (ref 0.2–1.2)
BUN SERPL-MCNC: 27 MG/DL — HIGH (ref 7–23)
CALCIUM SERPL-MCNC: 9.3 MG/DL — SIGNIFICANT CHANGE UP (ref 8.5–10.1)
CHLORIDE SERPL-SCNC: 107 MMOL/L — SIGNIFICANT CHANGE UP (ref 96–108)
CO2 SERPL-SCNC: 28 MMOL/L — SIGNIFICANT CHANGE UP (ref 22–31)
CREAT SERPL-MCNC: 1.34 MG/DL — HIGH (ref 0.5–1.3)
EOSINOPHIL # BLD AUTO: 0.2 K/UL — SIGNIFICANT CHANGE UP (ref 0–0.5)
EOSINOPHIL NFR BLD AUTO: 2.5 % — SIGNIFICANT CHANGE UP (ref 0–6)
GLUCOSE SERPL-MCNC: 122 MG/DL — HIGH (ref 70–99)
HCT VFR BLD CALC: 37.8 % — LOW (ref 39–50)
HGB BLD-MCNC: 12.7 G/DL — LOW (ref 13–17)
INR BLD: 1.03 RATIO — SIGNIFICANT CHANGE UP (ref 0.88–1.16)
LYMPHOCYTES # BLD AUTO: 1.1 K/UL — SIGNIFICANT CHANGE UP (ref 1–3.3)
LYMPHOCYTES # BLD AUTO: 14.9 % — SIGNIFICANT CHANGE UP (ref 13–44)
MCHC RBC-ENTMCNC: 30.3 PG — SIGNIFICANT CHANGE UP (ref 27–34)
MCHC RBC-ENTMCNC: 33.6 GM/DL — SIGNIFICANT CHANGE UP (ref 32–36)
MCV RBC AUTO: 90.3 FL — SIGNIFICANT CHANGE UP (ref 80–100)
MONOCYTES # BLD AUTO: 0.5 K/UL — SIGNIFICANT CHANGE UP (ref 0–0.9)
MONOCYTES NFR BLD AUTO: 7 % — SIGNIFICANT CHANGE UP (ref 2–14)
NEUTROPHILS # BLD AUTO: 5.3 K/UL — SIGNIFICANT CHANGE UP (ref 1.8–7.4)
NEUTROPHILS NFR BLD AUTO: 74.4 % — SIGNIFICANT CHANGE UP (ref 43–77)
PLATELET # BLD AUTO: 198 K/UL — SIGNIFICANT CHANGE UP (ref 150–400)
POTASSIUM SERPL-MCNC: 4.1 MMOL/L — SIGNIFICANT CHANGE UP (ref 3.5–5.3)
POTASSIUM SERPL-SCNC: 4.1 MMOL/L — SIGNIFICANT CHANGE UP (ref 3.5–5.3)
PROT SERPL-MCNC: 6.5 GM/DL — SIGNIFICANT CHANGE UP (ref 6–8.3)
PROTHROM AB SERPL-ACNC: 11.1 SEC — SIGNIFICANT CHANGE UP (ref 9.8–12.7)
RBC # BLD: 4.19 M/UL — LOW (ref 4.2–5.8)
RBC # FLD: 12.1 % — SIGNIFICANT CHANGE UP (ref 10.3–14.5)
SODIUM SERPL-SCNC: 143 MMOL/L — SIGNIFICANT CHANGE UP (ref 135–145)
WBC # BLD: 7.1 K/UL — SIGNIFICANT CHANGE UP (ref 3.8–10.5)
WBC # FLD AUTO: 7.1 K/UL — SIGNIFICANT CHANGE UP (ref 3.8–10.5)

## 2017-04-10 PROCEDURE — 93010 ELECTROCARDIOGRAM REPORT: CPT

## 2017-04-10 PROCEDURE — 99285 EMERGENCY DEPT VISIT HI MDM: CPT

## 2017-04-10 PROCEDURE — 70450 CT HEAD/BRAIN W/O DYE: CPT | Mod: 26

## 2017-04-10 PROCEDURE — 71010: CPT | Mod: 26

## 2017-04-10 RX ORDER — DEXTROSE 50 % IN WATER 50 %
25 SYRINGE (ML) INTRAVENOUS ONCE
Qty: 0 | Refills: 0 | Status: DISCONTINUED | OUTPATIENT
Start: 2017-04-10 | End: 2017-04-11

## 2017-04-10 RX ORDER — CLOPIDOGREL BISULFATE 75 MG/1
75 TABLET, FILM COATED ORAL DAILY
Qty: 0 | Refills: 0 | Status: DISCONTINUED | OUTPATIENT
Start: 2017-04-10 | End: 2017-04-11

## 2017-04-10 RX ORDER — ASPIRIN/CALCIUM CARB/MAGNESIUM 324 MG
81 TABLET ORAL DAILY
Qty: 0 | Refills: 0 | Status: DISCONTINUED | OUTPATIENT
Start: 2017-04-10 | End: 2017-04-11

## 2017-04-10 RX ORDER — INSULIN LISPRO 100/ML
VIAL (ML) SUBCUTANEOUS AT BEDTIME
Qty: 0 | Refills: 0 | Status: DISCONTINUED | OUTPATIENT
Start: 2017-04-10 | End: 2017-04-11

## 2017-04-10 RX ORDER — CHOLECALCIFEROL (VITAMIN D3) 125 MCG
1000 CAPSULE ORAL DAILY
Qty: 0 | Refills: 0 | Status: DISCONTINUED | OUTPATIENT
Start: 2017-04-10 | End: 2017-04-11

## 2017-04-10 RX ORDER — HEPARIN SODIUM 5000 [USP'U]/ML
5000 INJECTION INTRAVENOUS; SUBCUTANEOUS EVERY 12 HOURS
Qty: 0 | Refills: 0 | Status: DISCONTINUED | OUTPATIENT
Start: 2017-04-10 | End: 2017-04-11

## 2017-04-10 RX ORDER — SODIUM CHLORIDE 9 MG/ML
1000 INJECTION, SOLUTION INTRAVENOUS
Qty: 0 | Refills: 0 | Status: DISCONTINUED | OUTPATIENT
Start: 2017-04-10 | End: 2017-04-11

## 2017-04-10 RX ORDER — SODIUM CHLORIDE 9 MG/ML
3 INJECTION INTRAMUSCULAR; INTRAVENOUS; SUBCUTANEOUS ONCE
Qty: 0 | Refills: 0 | Status: COMPLETED | OUTPATIENT
Start: 2017-04-10 | End: 2017-04-10

## 2017-04-10 RX ORDER — ACETAMINOPHEN 500 MG
650 TABLET ORAL EVERY 6 HOURS
Qty: 0 | Refills: 0 | Status: DISCONTINUED | OUTPATIENT
Start: 2017-04-10 | End: 2017-04-11

## 2017-04-10 RX ORDER — DEXTROSE 50 % IN WATER 50 %
1 SYRINGE (ML) INTRAVENOUS ONCE
Qty: 0 | Refills: 0 | Status: DISCONTINUED | OUTPATIENT
Start: 2017-04-10 | End: 2017-04-11

## 2017-04-10 RX ORDER — METOPROLOL TARTRATE 50 MG
100 TABLET ORAL DAILY
Qty: 0 | Refills: 0 | Status: DISCONTINUED | OUTPATIENT
Start: 2017-04-10 | End: 2017-04-11

## 2017-04-10 RX ORDER — CARBIDOPA AND LEVODOPA 25; 100 MG/1; MG/1
1 TABLET ORAL THREE TIMES A DAY
Qty: 0 | Refills: 0 | Status: DISCONTINUED | OUTPATIENT
Start: 2017-04-10 | End: 2017-04-11

## 2017-04-10 RX ORDER — ATORVASTATIN CALCIUM 80 MG/1
10 TABLET, FILM COATED ORAL AT BEDTIME
Qty: 0 | Refills: 0 | Status: DISCONTINUED | OUTPATIENT
Start: 2017-04-10 | End: 2017-04-11

## 2017-04-10 RX ORDER — DEXTROSE 50 % IN WATER 50 %
12.5 SYRINGE (ML) INTRAVENOUS ONCE
Qty: 0 | Refills: 0 | Status: DISCONTINUED | OUTPATIENT
Start: 2017-04-10 | End: 2017-04-11

## 2017-04-10 RX ORDER — CARBIDOPA AND LEVODOPA 25; 100 MG/1; MG/1
1 TABLET ORAL ONCE
Qty: 0 | Refills: 0 | Status: COMPLETED | OUTPATIENT
Start: 2017-04-10 | End: 2017-04-10

## 2017-04-10 RX ORDER — INSULIN LISPRO 100/ML
VIAL (ML) SUBCUTANEOUS
Qty: 0 | Refills: 0 | Status: DISCONTINUED | OUTPATIENT
Start: 2017-04-10 | End: 2017-04-11

## 2017-04-10 RX ORDER — FOLIC ACID 0.8 MG
1 TABLET ORAL DAILY
Qty: 0 | Refills: 0 | Status: DISCONTINUED | OUTPATIENT
Start: 2017-04-10 | End: 2017-04-11

## 2017-04-10 RX ORDER — MESALAMINE 400 MG
1200 TABLET, DELAYED RELEASE (ENTERIC COATED) ORAL DAILY
Qty: 0 | Refills: 0 | Status: DISCONTINUED | OUTPATIENT
Start: 2017-04-10 | End: 2017-04-11

## 2017-04-10 RX ORDER — GLUCAGON INJECTION, SOLUTION 0.5 MG/.1ML
1 INJECTION, SOLUTION SUBCUTANEOUS ONCE
Qty: 0 | Refills: 0 | Status: DISCONTINUED | OUTPATIENT
Start: 2017-04-10 | End: 2017-04-11

## 2017-04-10 RX ADMIN — CARBIDOPA AND LEVODOPA 1 TABLET(S): 25; 100 TABLET ORAL at 17:06

## 2017-04-10 RX ADMIN — HEPARIN SODIUM 5000 UNIT(S): 5000 INJECTION INTRAVENOUS; SUBCUTANEOUS at 21:13

## 2017-04-10 RX ADMIN — ATORVASTATIN CALCIUM 10 MILLIGRAM(S): 80 TABLET, FILM COATED ORAL at 22:52

## 2017-04-10 RX ADMIN — Medication 100 MILLIGRAM(S): at 22:52

## 2017-04-10 RX ADMIN — CARBIDOPA AND LEVODOPA 1 TABLET(S): 25; 100 TABLET ORAL at 22:52

## 2017-04-10 RX ADMIN — SODIUM CHLORIDE 3 MILLILITER(S): 9 INJECTION INTRAMUSCULAR; INTRAVENOUS; SUBCUTANEOUS at 14:20

## 2017-04-10 NOTE — H&P ADULT - PROBLEM SELECTOR PLAN 1
..  - Frequent admissions  - No acute cardiac / neurological issue  - PT evaluation in AM and then discharge to rehab or home with home PT

## 2017-04-10 NOTE — H&P ADULT - HISTORY OF PRESENT ILLNESS
89 year old male with history of Parkinson disease, Hypertension, CAD, DM, CKD stage 3, Frequent fall and admissions to hospital was brought to our ER on 4/10/17 via EMS for a fall at home over 24 hr ago for which PCP advised the pt to go to hospital.     Pt appears bed side, appears comfortable.  Denies hitting his head or having any significant injury from fall.  Pt has been undergoing home PT but states that his balance is still an issue.

## 2017-04-10 NOTE — H&P ADULT - NSHPPHYSICALEXAM_GEN_ALL_CORE
PHYSICAL EXAM:      Constitutional: NAD, well-groomed, well-developed  HEENT: PERRLA, EOMI, Normal Hearing  Neck: No LAD, No JVD  Back: Normal spine flexure, No CVA tenderness  Respiratory: CTABL  Cardiovascular: S1 and S2, RRR, no M/G/R  Gastrointestinal: BS+, soft, NT/ND  Extremities: No peripheral edema  Vascular: 2+ peripheral pulses  Neurological: A/O x 3, no focal deficits, ambulatory with a walker  Psychiatric: Normal mood, normal affect  Musculoskeletal: 5/5 strength b/l upper and lower extremities  Skin: No rashes

## 2017-04-10 NOTE — H&P ADULT - ASSESSMENT
89 year old male with history of Parkinson disease, Hypertension, CAD, DM, CKD stage 3, Frequent fall and admissions to hospital was brought to our ER on 4/10/17 via EMS for a fall at home over 24 hr ago for which PCP advised the pt to go to hospital.     Pt appears bed side, appears comfortable.  Denies hitting his head or having any significant injury from fall.      Pt is being admitted for PT evaluation for possible rehab in morning.

## 2017-04-10 NOTE — ED ADULT NURSE NOTE - CHPI ED SYMPTOMS NEG
no tingling/no loss of consciousness/no bleeding/no vomiting/no weakness/no abrasion/no numbness/no confusion/no fever/no deformity

## 2017-04-10 NOTE — ED PROVIDER NOTE - CONSTITUTIONAL, MLM
normal... Well appearing, well nourished elderly male, awake, alert, oriented to person, place, time/situation and in no apparent distress.

## 2017-04-10 NOTE — ED ADULT NURSE NOTE - OBJECTIVE STATEMENT
Pt states he fell out of his wheelchair last night.  Denies hitting head.  Trauma alert cancelled by Dr Vásquez.  Pt A/0x3.  Denies pain at this time.

## 2017-04-10 NOTE — ED PROVIDER NOTE - NS ED MD SCRIBE ATTENDING SCRIBE SECTIONS
REVIEW OF SYSTEMS/RESULTS/PAST MEDICAL/SURGICAL/SOCIAL HISTORY/PHYSICAL EXAM/HISTORY OF PRESENT ILLNESS/DISPOSITION/PROGRESS NOTE

## 2017-04-10 NOTE — ED PROVIDER NOTE - OBJECTIVE STATEMENT
88 y/o M with a PMHx of DM, HTN, Parkinson's, renal disease presents to the ED s/p mechanical fall that occurred last night as per wife. Pt states that he was moving from walker to transporter, missed transporter and fell. Pt wife states that he has been sleeping more frequently and requesting that he be admitted as his balance is worsening. Pt currently calm not experiencing any pain, able to give adequate hx and denies any other acute c/o at this time.

## 2017-04-10 NOTE — H&P ADULT - NSHPLABSRESULTS_GEN_ALL_CORE
T(C): 36.3, Max: 36.3 (04-10 @ 13:48)  HR: 77 (77 - 88)  BP: 145/83 (145/83 - 148/88)  RR: 15 (15 - 16)  SpO2: 100% (96% - 100%)  Wt(kg): --                              12.7   7.1   )-----------( 198      ( 10 Apr 2017 14:18 )             37.8     10 Apr 2017 14:18    143    |  107    |  27     ----------------------------<  122    4.1     |  28     |  1.34     Ca    9.3        10 Apr 2017 14:18    TPro  6.5    /  Alb  3.0    /  TBili  0.2    /  DBili  x      /  AST  20     /  ALT  12     /  AlkPhos  93     10 Apr 2017 14:18    PT/INR - ( 10 Apr 2017 14:18 )   PT: 11.1 sec;   INR: 1.03 ratio         PTT - ( 10 Apr 2017 14:18 )  PTT:33.6 sec  CAPILLARY BLOOD GLUCOSE    LIVER FUNCTIONS - ( 10 Apr 2017 14:18 )  Alb: 3.0 g/dL / Pro: 6.5 gm/dL / ALK PHOS: 93 U/L / ALT: 12 U/L / AST: 20 U/L / GGT: x

## 2017-04-11 ENCOUNTER — TRANSCRIPTION ENCOUNTER (OUTPATIENT)
Age: 82
End: 2017-04-11

## 2017-04-11 VITALS
HEART RATE: 61 BPM | DIASTOLIC BLOOD PRESSURE: 67 MMHG | SYSTOLIC BLOOD PRESSURE: 144 MMHG | RESPIRATION RATE: 16 BRPM | TEMPERATURE: 97 F | OXYGEN SATURATION: 95 %

## 2017-04-11 LAB
ADD ON TEST-SPECIMEN IN LAB: SIGNIFICANT CHANGE UP
ANION GAP SERPL CALC-SCNC: 8 MMOL/L — SIGNIFICANT CHANGE UP (ref 5–17)
BASOPHILS # BLD AUTO: 0.1 K/UL — SIGNIFICANT CHANGE UP (ref 0–0.2)
BASOPHILS NFR BLD AUTO: 1.3 % — SIGNIFICANT CHANGE UP (ref 0–2)
BUN SERPL-MCNC: 25 MG/DL — HIGH (ref 7–23)
CALCIUM SERPL-MCNC: 9.4 MG/DL — SIGNIFICANT CHANGE UP (ref 8.5–10.1)
CHLORIDE SERPL-SCNC: 108 MMOL/L — SIGNIFICANT CHANGE UP (ref 96–108)
CO2 SERPL-SCNC: 27 MMOL/L — SIGNIFICANT CHANGE UP (ref 22–31)
CREAT SERPL-MCNC: 1.28 MG/DL — SIGNIFICANT CHANGE UP (ref 0.5–1.3)
EOSINOPHIL # BLD AUTO: 0.2 K/UL — SIGNIFICANT CHANGE UP (ref 0–0.5)
EOSINOPHIL NFR BLD AUTO: 2.7 % — SIGNIFICANT CHANGE UP (ref 0–6)
GLUCOSE SERPL-MCNC: 104 MG/DL — HIGH (ref 70–99)
HBA1C BLD-MCNC: 6.4 % — HIGH (ref 4–5.6)
HBA1C BLD-MCNC: 6.5 % — HIGH (ref 4–5.6)
HCT VFR BLD CALC: 37 % — LOW (ref 39–50)
HGB BLD-MCNC: 12.4 G/DL — LOW (ref 13–17)
LYMPHOCYTES # BLD AUTO: 1.1 K/UL — SIGNIFICANT CHANGE UP (ref 1–3.3)
LYMPHOCYTES # BLD AUTO: 15.7 % — SIGNIFICANT CHANGE UP (ref 13–44)
MCHC RBC-ENTMCNC: 30.3 PG — SIGNIFICANT CHANGE UP (ref 27–34)
MCHC RBC-ENTMCNC: 33.4 GM/DL — SIGNIFICANT CHANGE UP (ref 32–36)
MCV RBC AUTO: 90.6 FL — SIGNIFICANT CHANGE UP (ref 80–100)
MONOCYTES # BLD AUTO: 0.5 K/UL — SIGNIFICANT CHANGE UP (ref 0–0.9)
MONOCYTES NFR BLD AUTO: 7 % — SIGNIFICANT CHANGE UP (ref 2–14)
NEUTROPHILS # BLD AUTO: 5.3 K/UL — SIGNIFICANT CHANGE UP (ref 1.8–7.4)
NEUTROPHILS NFR BLD AUTO: 73.3 % — SIGNIFICANT CHANGE UP (ref 43–77)
PLATELET # BLD AUTO: 201 K/UL — SIGNIFICANT CHANGE UP (ref 150–400)
POTASSIUM SERPL-MCNC: 4.2 MMOL/L — SIGNIFICANT CHANGE UP (ref 3.5–5.3)
POTASSIUM SERPL-SCNC: 4.2 MMOL/L — SIGNIFICANT CHANGE UP (ref 3.5–5.3)
RBC # BLD: 4.09 M/UL — LOW (ref 4.2–5.8)
RBC # FLD: 12.6 % — SIGNIFICANT CHANGE UP (ref 10.3–14.5)
SODIUM SERPL-SCNC: 143 MMOL/L — SIGNIFICANT CHANGE UP (ref 135–145)
WBC # BLD: 7.2 K/UL — SIGNIFICANT CHANGE UP (ref 3.8–10.5)
WBC # FLD AUTO: 7.2 K/UL — SIGNIFICANT CHANGE UP (ref 3.8–10.5)

## 2017-04-11 RX ORDER — HEPARIN SODIUM 5000 [USP'U]/ML
5000 INJECTION INTRAVENOUS; SUBCUTANEOUS
Qty: 0 | Refills: 0 | COMMUNITY
Start: 2017-04-11

## 2017-04-11 RX ORDER — SITAGLIPTIN 50 MG/1
1 TABLET, FILM COATED ORAL
Qty: 30 | Refills: 0 | OUTPATIENT
Start: 2017-04-11 | End: 2017-05-11

## 2017-04-11 RX ORDER — SODIUM CHLORIDE 9 MG/ML
1000 INJECTION INTRAMUSCULAR; INTRAVENOUS; SUBCUTANEOUS
Qty: 0 | Refills: 0 | Status: DISCONTINUED | OUTPATIENT
Start: 2017-04-11 | End: 2017-04-11

## 2017-04-11 RX ADMIN — Medication 1200 MILLIGRAM(S): at 12:13

## 2017-04-11 RX ADMIN — CARBIDOPA AND LEVODOPA 1 TABLET(S): 25; 100 TABLET ORAL at 06:23

## 2017-04-11 RX ADMIN — Medication 81 MILLIGRAM(S): at 12:13

## 2017-04-11 RX ADMIN — Medication 1 MILLIGRAM(S): at 12:13

## 2017-04-11 RX ADMIN — Medication 1: at 09:30

## 2017-04-11 RX ADMIN — Medication 1000 UNIT(S): at 12:13

## 2017-04-11 RX ADMIN — CLOPIDOGREL BISULFATE 75 MILLIGRAM(S): 75 TABLET, FILM COATED ORAL at 12:13

## 2017-04-11 RX ADMIN — SODIUM CHLORIDE 42 MILLILITER(S): 9 INJECTION INTRAMUSCULAR; INTRAVENOUS; SUBCUTANEOUS at 11:07

## 2017-04-11 RX ADMIN — HEPARIN SODIUM 5000 UNIT(S): 5000 INJECTION INTRAVENOUS; SUBCUTANEOUS at 06:23

## 2017-04-11 NOTE — DISCHARGE NOTE ADULT - INSTRUCTIONS
Never get out of bed or the chair without assistance.  Always call for help first. Continue to turn & reposition within the bed & chair to prevent skin breakdown & promote skin health

## 2017-04-11 NOTE — PHYSICAL THERAPY INITIAL EVALUATION ADULT - PERTINENT HX OF CURRENT PROBLEM, REHAB EVAL
89M brought to our ER on 4/10/17 via EMS for a fall at home over 24 hr ago for which PCP advised the pt to go to hospital.

## 2017-04-11 NOTE — DISCHARGE NOTE ADULT - MEDICATION SUMMARY - MEDICATIONS TO TAKE
I will START or STAY ON the medications listed below when I get home from the hospital:    Lialda 1.2 g oral delayed release tablet  -- 1 tab(s) by mouth once a day  -- Indication: For UC    aspirin 81 mg oral tablet, chewable  -- 1 tab(s) by mouth once a day  -- Indication: For CAD    acetaminophen 325 mg oral tablet  -- 2 tab(s) by mouth every 6 hours, As needed, For Temp greater than 38 C (100.4 F)  -- Indication: For Pain    Nitrostat 0.4 mg sublingual tablet  -- 1 tab(s) under tongue every 5 minutes, As Needed  -- Indication: For CAD    heparin  -- 5000 unit(s) subcutaneous every 12 hours  -- Indication: For DVT PPx    Lipitor 10 mg oral tablet  -- 1 tab(s) by mouth once a day  -- Indication: For HLD    Uloric 40 mg oral tablet  -- 1 tab(s) by mouth once a day  -- Indication: For gout    Sinemet 25 mg-100 mg oral tablet  -- 1 tab(s) by mouth 3 times a day  -- Indication: For Parkinson's dz    Plavix 75 mg oral tablet  -- 1 tab(s) by mouth once a day  -- Indication: For CAD    Toprol- mg oral tablet, extended release  -- 1 tab(s) by mouth once a day  -- Indication: For CAD    Vitamin D3 1000 intl units oral tablet  -- 3 tab(s) by mouth once a day  -- Indication: For general health    folic acid 0.4 mg oral tablet  -- 1 tab(s) by mouth once a day  -- Indication: For general health

## 2017-04-11 NOTE — DISCHARGE NOTE ADULT - HOSPITAL COURSE
89 year old male with history of Parkinson disease, Hypertension, CAD, DM, CKD stage 3, Frequent fall and admissions to hospital was brought to our ER on 4/10/17 via EMS for a fall at home over 24 hr ago for which PCP advised the pt to go to hospital.     Pt appears bed side, appears comfortable.  Denies hitting his head or having any significant injury from fall.  Pt has been undergoing home PT but states that his balance is still an issue.     4/11: No O/N events. No new complaints. Feels well w/o pain 2/2 recent fall. Sensorium intact.          Physical Exam:   Constitutional: NAD, well-groomed, well-developed HEENT: PERRLA, EOMI, Normal Hearing Neck: No LAD, No JVD Back: Normal spine flexure, No CVA tenderness Respiratory: CTABL Cardiovascular: S1 and S2, RRR, no M/G/R Gastrointestinal: BS+, soft, NT/ND Extremities: No peripheral edema Vascular: 2+ peripheral pulses Neurological: A/O x 3, no focal deficits, ambulatory with a walker Psychiatric: Normal mood, normal affect Musculoskeletal: 5/5 strength b/l upper and lower extremities Skin: No rashes  T(C): 36.3, Max: 36.7 (04-10 @ 19:09) HR: 61 (60 - 88) BP: 144/67 (118/57 - 176/63) RR: 16 (15 - 16) SpO2: 95% (95% - 100%) Wt(kg): --              12.4  7.2   )-----------( 201      ( 11 Apr 2017 06:47 )            37.0   11 Apr 2017 06:47  143    |  108    |  25    ----------------------------<  104   4.2     |  27     |  1.28   Ca    9.4        11 Apr 2017 06:47  TPro  6.5    /  Alb  3.0    /  TBili  0.2    /  DBili  x      /  AST  20     /  ALT  12     /  AlkPhos  93     10 Apr 2017 14:18  PT/INR - ( 10 Apr 2017 14:18 )   PT: 11.1 sec;   INR: 1.03 ratio      PTT - ( 10 Apr 2017 14:18 )  PTT:33.6 sec CAPILLARY BLOOD GLUCOSE 195 (11 Apr 2017 08:33) 181 (10 Apr 2017 21:20)  LIVER FUNCTIONS - ( 10 Apr 2017 14:18 ) Alb: 3.0 g/dL / Pro: 6.5 gm/dL / ALK PHOS: 93 U/L / ALT: 12 U/L / AST: 20 U/L / GGT: x        	    Assessment and Plan:   Assessment:  · Assessment		    89 year old male with history of Parkinson disease, Hypertension, CAD, DM, CKD stage 3, Frequent fall and admissions to hospital was brought to our ER on 4/10/17 via EMS for recurrent fall at home    Pt appears bed side, appears comfortable.  Denies hitting his head or having any significant injury from fall.      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

## 2017-04-11 NOTE — PROGRESS NOTE ADULT - SUBJECTIVE AND OBJECTIVE BOX
History of Present Illness: 	    89 year old male with history of Parkinson disease, Hypertension, CAD, DM, CKD stage 3, Frequent fall and admissions to hospital was brought to our ER on 4/10/17 via EMS for a fall at home over 24 hr ago for which PCP advised the pt to go to hospital.     Pt appears bed side, appears comfortable.  Denies hitting his head or having any significant injury from fall.  Pt has been undergoing home PT but states that his balance is still an issue.     4/11: No O/N events. No new complaints. Feels well w/o pain 2/2 recent fall. Sensorium intact.          Physical Exam:   Constitutional: NAD, well-groomed, well-developed HEENT: PERRLA, EOMI, Normal Hearing Neck: No LAD, No JVD Back: Normal spine flexure, No CVA tenderness Respiratory: CTABL Cardiovascular: S1 and S2, RRR, no M/G/R Gastrointestinal: BS+, soft, NT/ND Extremities: No peripheral edema Vascular: 2+ peripheral pulses Neurological: A/O x 3, no focal deficits, ambulatory with a walker Psychiatric: Normal mood, normal affect Musculoskeletal: 5/5 strength b/l upper and lower extremities Skin: No rashes  T(C): 36.3, Max: 36.7 (04-10 @ 19:09) HR: 61 (60 - 88) BP: 144/67 (118/57 - 176/63) RR: 16 (15 - 16) SpO2: 95% (95% - 100%) Wt(kg): --              12.4  7.2   )-----------( 201      ( 11 Apr 2017 06:47 )            37.0   11 Apr 2017 06:47  143    |  108    |  25    ----------------------------<  104   4.2     |  27     |  1.28   Ca    9.4        11 Apr 2017 06:47  TPro  6.5    /  Alb  3.0    /  TBili  0.2    /  DBili  x      /  AST  20     /  ALT  12     /  AlkPhos  93     10 Apr 2017 14:18  PT/INR - ( 10 Apr 2017 14:18 )   PT: 11.1 sec;   INR: 1.03 ratio      PTT - ( 10 Apr 2017 14:18 )  PTT:33.6 sec CAPILLARY BLOOD GLUCOSE 195 (11 Apr 2017 08:33) 181 (10 Apr 2017 21:20)  LIVER FUNCTIONS - ( 10 Apr 2017 14:18 ) Alb: 3.0 g/dL / Pro: 6.5 gm/dL / ALK PHOS: 93 U/L / ALT: 12 U/L / AST: 20 U/L / GGT: x        	    Assessment and Plan:   Assessment:  · Assessment		    89 year old male with history of Parkinson disease, Hypertension, CAD, DM, CKD stage 3, Frequent fall and admissions to hospital was brought to our ER on 4/10/17 via EMS for recurrent fall at home    Pt appears bed side, appears comfortable.  Denies hitting his head or having any significant injury from fall.      ·  Frequent falls, multifactorial 2/2 profound musculoskeletal deconditioning, progressive cerebellar ataxia in the setting of Parkinson's disease & likely orthostasis 2/2 dehydration  - IV hydration  - PT eval appreciated- recommending BENITA- Pt would especially benefit from cerebellar & vestibular training    ·  Dehydration/hypovolemia, prerenal azotemia  - gentle IVF  - encourage increased PO intake    ·	HTN, stable  - c/w current management    ·  Diabetes mellitus  - ISS while inpatient  - HbA1C pending    ·	DVT PPx- heparin SQ

## 2017-04-11 NOTE — DISCHARGE NOTE ADULT - CARE PLAN
Principal Discharge DX:	Frequent falls  Goal:	improve motor & cerebellar function  Instructions for follow-up, activity and diet:	perform daily PT according to rehab schedule  stay well-hydrated  continue Sinemet & supplements as prescribed  Secondary Diagnosis:	Parkinson disease  Goal:	limit progression  Instructions for follow-up, activity and diet:	continue medication as above  Secondary Diagnosis:	Diabetes  Goal:	maintain HbA1C < 7, ideally closer to 6  Instructions for follow-up, activity and diet:	start Januvia adjusted for kidney function  low-dose ACE-I  stay well-hydrated

## 2017-04-11 NOTE — DISCHARGE NOTE ADULT - PATIENT PORTAL LINK FT
“You can access the [facility] Patient Portal, offered by [facility], by registering with the following website: [email or web address]”

## 2017-04-11 NOTE — DISCHARGE NOTE ADULT - PLAN OF CARE
improve motor & cerebellar function perform daily PT according to rehab schedule  stay well-hydrated  continue Sinemet & supplements as prescribed limit progression continue medication as above maintain HbA1C < 7, ideally closer to 6 start Januvia adjusted for kidney function  low-dose ACE-I  stay well-hydrated

## 2017-04-13 DIAGNOSIS — I12.9 HYPERTENSIVE CHRONIC KIDNEY DISEASE WITH STAGE 1 THROUGH STAGE 4 CHRONIC KIDNEY DISEASE, OR UNSPECIFIED CHRONIC KIDNEY DISEASE: ICD-10-CM

## 2017-04-13 DIAGNOSIS — E86.0 DEHYDRATION: ICD-10-CM

## 2017-04-13 DIAGNOSIS — G20 PARKINSON'S DISEASE: ICD-10-CM

## 2017-04-13 DIAGNOSIS — N18.3 CHRONIC KIDNEY DISEASE, STAGE 3 (MODERATE): ICD-10-CM

## 2017-04-13 DIAGNOSIS — E11.9 TYPE 2 DIABETES MELLITUS WITHOUT COMPLICATIONS: ICD-10-CM

## 2017-04-13 DIAGNOSIS — G11.9 HEREDITARY ATAXIA, UNSPECIFIED: ICD-10-CM

## 2017-04-13 DIAGNOSIS — Z91.81 HISTORY OF FALLING: ICD-10-CM

## 2017-04-13 DIAGNOSIS — E86.1 HYPOVOLEMIA: ICD-10-CM

## 2017-04-13 DIAGNOSIS — I25.10 ATHEROSCLEROTIC HEART DISEASE OF NATIVE CORONARY ARTERY WITHOUT ANGINA PECTORIS: ICD-10-CM

## 2017-04-14 DIAGNOSIS — R29.6 REPEATED FALLS: ICD-10-CM

## 2017-04-14 DIAGNOSIS — E11.22 TYPE 2 DIABETES MELLITUS WITH DIABETIC CHRONIC KIDNEY DISEASE: ICD-10-CM

## 2017-06-05 ENCOUNTER — APPOINTMENT (OUTPATIENT)
Dept: ELECTROPHYSIOLOGY | Facility: CLINIC | Age: 82
End: 2017-06-05

## 2017-07-05 ENCOUNTER — INPATIENT (INPATIENT)
Facility: HOSPITAL | Age: 82
LOS: 4 days | Discharge: NURSING, REHAB OR LONG-TERM CARE | End: 2017-07-10
Attending: INTERNAL MEDICINE | Admitting: INTERNAL MEDICINE
Payer: COMMERCIAL

## 2017-07-05 VITALS
DIASTOLIC BLOOD PRESSURE: 98 MMHG | RESPIRATION RATE: 18 BRPM | OXYGEN SATURATION: 100 % | WEIGHT: 171.96 LBS | TEMPERATURE: 98 F | HEART RATE: 86 BPM | HEIGHT: 68 IN | SYSTOLIC BLOOD PRESSURE: 211 MMHG

## 2017-07-05 LAB
ALBUMIN SERPL ELPH-MCNC: 3.8 G/DL — SIGNIFICANT CHANGE UP (ref 3.3–5)
ALP SERPL-CCNC: 84 U/L — SIGNIFICANT CHANGE UP (ref 40–120)
ALT FLD-CCNC: 13 U/L — SIGNIFICANT CHANGE UP (ref 12–78)
ANION GAP SERPL CALC-SCNC: 6 MMOL/L — SIGNIFICANT CHANGE UP (ref 5–17)
APTT BLD: 35.8 SEC — SIGNIFICANT CHANGE UP (ref 27.5–37.4)
AST SERPL-CCNC: 25 U/L — SIGNIFICANT CHANGE UP (ref 15–37)
BASOPHILS # BLD AUTO: 0.1 K/UL — SIGNIFICANT CHANGE UP (ref 0–0.2)
BASOPHILS NFR BLD AUTO: 1.6 % — SIGNIFICANT CHANGE UP (ref 0–2)
BILIRUB SERPL-MCNC: 0.4 MG/DL — SIGNIFICANT CHANGE UP (ref 0.2–1.2)
BUN SERPL-MCNC: 28 MG/DL — HIGH (ref 7–23)
CALCIUM SERPL-MCNC: 9.2 MG/DL — SIGNIFICANT CHANGE UP (ref 8.5–10.1)
CHLORIDE SERPL-SCNC: 108 MMOL/L — SIGNIFICANT CHANGE UP (ref 96–108)
CO2 SERPL-SCNC: 28 MMOL/L — SIGNIFICANT CHANGE UP (ref 22–31)
CREAT SERPL-MCNC: 1.49 MG/DL — HIGH (ref 0.5–1.3)
EOSINOPHIL # BLD AUTO: 0.4 K/UL — SIGNIFICANT CHANGE UP (ref 0–0.5)
EOSINOPHIL NFR BLD AUTO: 5.5 % — SIGNIFICANT CHANGE UP (ref 0–6)
GLUCOSE SERPL-MCNC: 104 MG/DL — HIGH (ref 70–99)
HCT VFR BLD CALC: 41.1 % — SIGNIFICANT CHANGE UP (ref 39–50)
HGB BLD-MCNC: 13.7 G/DL — SIGNIFICANT CHANGE UP (ref 13–17)
INR BLD: 1.04 RATIO — SIGNIFICANT CHANGE UP (ref 0.88–1.16)
LYMPHOCYTES # BLD AUTO: 1.2 K/UL — SIGNIFICANT CHANGE UP (ref 1–3.3)
LYMPHOCYTES # BLD AUTO: 16.5 % — SIGNIFICANT CHANGE UP (ref 13–44)
MCHC RBC-ENTMCNC: 29.4 PG — SIGNIFICANT CHANGE UP (ref 27–34)
MCHC RBC-ENTMCNC: 33.3 GM/DL — SIGNIFICANT CHANGE UP (ref 32–36)
MCV RBC AUTO: 88.1 FL — SIGNIFICANT CHANGE UP (ref 80–100)
MONOCYTES # BLD AUTO: 0.7 K/UL — SIGNIFICANT CHANGE UP (ref 0–0.9)
MONOCYTES NFR BLD AUTO: 10 % — SIGNIFICANT CHANGE UP (ref 2–14)
NEUTROPHILS # BLD AUTO: 4.9 K/UL — SIGNIFICANT CHANGE UP (ref 1.8–7.4)
NEUTROPHILS NFR BLD AUTO: 66.4 % — SIGNIFICANT CHANGE UP (ref 43–77)
PLATELET # BLD AUTO: 179 K/UL — SIGNIFICANT CHANGE UP (ref 150–400)
POTASSIUM SERPL-MCNC: 3.9 MMOL/L — SIGNIFICANT CHANGE UP (ref 3.5–5.3)
POTASSIUM SERPL-SCNC: 3.9 MMOL/L — SIGNIFICANT CHANGE UP (ref 3.5–5.3)
PROT SERPL-MCNC: 7.2 GM/DL — SIGNIFICANT CHANGE UP (ref 6–8.3)
PROTHROM AB SERPL-ACNC: 11.2 SEC — SIGNIFICANT CHANGE UP (ref 9.8–12.7)
RBC # BLD: 4.66 M/UL — SIGNIFICANT CHANGE UP (ref 4.2–5.8)
RBC # FLD: 12.8 % — SIGNIFICANT CHANGE UP (ref 10.3–14.5)
SODIUM SERPL-SCNC: 142 MMOL/L — SIGNIFICANT CHANGE UP (ref 135–145)
TROPONIN I SERPL-MCNC: <0.015 NG/ML — SIGNIFICANT CHANGE UP (ref 0.01–0.04)
WBC # BLD: 7.3 K/UL — SIGNIFICANT CHANGE UP (ref 3.8–10.5)
WBC # FLD AUTO: 7.3 K/UL — SIGNIFICANT CHANGE UP (ref 3.8–10.5)

## 2017-07-05 PROCEDURE — 99285 EMERGENCY DEPT VISIT HI MDM: CPT

## 2017-07-05 PROCEDURE — 71010: CPT | Mod: 26

## 2017-07-05 PROCEDURE — 70450 CT HEAD/BRAIN W/O DYE: CPT | Mod: 26

## 2017-07-05 PROCEDURE — 93010 ELECTROCARDIOGRAM REPORT: CPT

## 2017-07-05 RX ORDER — METOPROLOL TARTRATE 50 MG
25 TABLET ORAL ONCE
Qty: 0 | Refills: 0 | Status: DISCONTINUED | OUTPATIENT
Start: 2017-07-05 | End: 2017-07-05

## 2017-07-05 RX ORDER — NITROGLYCERIN 6.5 MG
0.4 CAPSULE, EXTENDED RELEASE ORAL ONCE
Qty: 0 | Refills: 0 | Status: COMPLETED | OUTPATIENT
Start: 2017-07-05 | End: 2017-07-05

## 2017-07-05 RX ORDER — MESALAMINE 400 MG
1 TABLET, DELAYED RELEASE (ENTERIC COATED) ORAL
Qty: 0 | Refills: 0 | COMMUNITY

## 2017-07-05 RX ORDER — FOLIC ACID 0.8 MG
1 TABLET ORAL
Qty: 0 | Refills: 0 | COMMUNITY

## 2017-07-05 RX ORDER — METOPROLOL TARTRATE 50 MG
5 TABLET ORAL ONCE
Qty: 0 | Refills: 0 | Status: DISCONTINUED | OUTPATIENT
Start: 2017-07-05 | End: 2017-07-05

## 2017-07-05 RX ORDER — NITROGLYCERIN 6.5 MG
1 CAPSULE, EXTENDED RELEASE ORAL
Qty: 0 | Refills: 0 | COMMUNITY

## 2017-07-05 RX ADMIN — Medication 0.4 MILLIGRAM(S): at 23:49

## 2017-07-05 RX ADMIN — Medication 0.4 MILLIGRAM(S): at 22:51

## 2017-07-05 NOTE — ED ADULT TRIAGE NOTE - CHIEF COMPLAINT QUOTE
Pt. to the ED BIBA and wife C/O Chest tightness and SOB since earlier this evening- Hx. of HTN and Parkinson's- Pt. hypertensive at this time

## 2017-07-05 NOTE — ED PROVIDER NOTE - PROGRESS NOTE DETAILS
AJM: Pt feeling improved with nitro. BP also improved. patient received in signout pending head ct; patient to be admitted for chest pain/hypertensive urgency  d/w Dr. Bonds

## 2017-07-05 NOTE — ED PROVIDER NOTE - OBJECTIVE STATEMENT
89 year old male with history of Parkinson disease, Hypertension, CAD, DM, CKD stage 3, presents with chest pain. He notes symptoms began this morning and have been worsening. Associated with sob. Symptoms worse with exertion. Also notes worsening weakness, especially in his legs which he says sometimes happens with parkinson's dz. No fevers chills abd pain. No coughing. He notes changes to color in urine. Pt also complaining of occipital headache but denies changes in vision or focal weakness/numbness. PMD is [place]. Chavis is cards

## 2017-07-05 NOTE — ED ADULT NURSE NOTE - OBJECTIVE STATEMENT
Pt presents to the ED with complaints of SOB, chest pain which worsens with inspiration, a headache on the left behind the ear. Pt in no respiratory distress upon assessment. Pt has a h/o parkinsons disease and is slightly contracted as baseline. Pt wife states pt was getting up to use bedside commode at home and was unable to get up because he couldn't feel his legs which is why she called 911. Pt denies any nausea or vomiting, afebrile.

## 2017-07-05 NOTE — ED PROVIDER NOTE - MEDICAL DECISION MAKING DETAILS
pt presenting with cheat pain and sob, worse with exertion, headache without neuro symptoms. will check head ct, labs, UA, trop, ecg, nitro admit. HEART score 5

## 2017-07-06 DIAGNOSIS — R07.89 OTHER CHEST PAIN: ICD-10-CM

## 2017-07-06 DIAGNOSIS — E11.9 TYPE 2 DIABETES MELLITUS WITHOUT COMPLICATIONS: ICD-10-CM

## 2017-07-06 DIAGNOSIS — G20 PARKINSON'S DISEASE: ICD-10-CM

## 2017-07-06 DIAGNOSIS — I16.0 HYPERTENSIVE URGENCY: ICD-10-CM

## 2017-07-06 LAB
ANION GAP SERPL CALC-SCNC: 6 MMOL/L — SIGNIFICANT CHANGE UP (ref 5–17)
APPEARANCE UR: CLEAR — SIGNIFICANT CHANGE UP
BACTERIA # UR AUTO: (no result)
BASOPHILS # BLD AUTO: 0.1 K/UL — SIGNIFICANT CHANGE UP (ref 0–0.2)
BASOPHILS NFR BLD AUTO: 1.9 % — SIGNIFICANT CHANGE UP (ref 0–2)
BILIRUB UR-MCNC: NEGATIVE — SIGNIFICANT CHANGE UP
BUN SERPL-MCNC: 27 MG/DL — HIGH (ref 7–23)
CALCIUM SERPL-MCNC: 9.3 MG/DL — SIGNIFICANT CHANGE UP (ref 8.5–10.1)
CHLORIDE SERPL-SCNC: 107 MMOL/L — SIGNIFICANT CHANGE UP (ref 96–108)
CO2 SERPL-SCNC: 29 MMOL/L — SIGNIFICANT CHANGE UP (ref 22–31)
COLOR SPEC: YELLOW — SIGNIFICANT CHANGE UP
CREAT SERPL-MCNC: 1.29 MG/DL — SIGNIFICANT CHANGE UP (ref 0.5–1.3)
DIFF PNL FLD: NEGATIVE — SIGNIFICANT CHANGE UP
EOSINOPHIL # BLD AUTO: 0.3 K/UL — SIGNIFICANT CHANGE UP (ref 0–0.5)
EOSINOPHIL NFR BLD AUTO: 4.5 % — SIGNIFICANT CHANGE UP (ref 0–6)
GLUCOSE SERPL-MCNC: 101 MG/DL — HIGH (ref 70–99)
GLUCOSE UR QL: NEGATIVE MG/DL — SIGNIFICANT CHANGE UP
HCT VFR BLD CALC: 40.4 % — SIGNIFICANT CHANGE UP (ref 39–50)
HGB BLD-MCNC: 13.4 G/DL — SIGNIFICANT CHANGE UP (ref 13–17)
KETONES UR-MCNC: NEGATIVE — SIGNIFICANT CHANGE UP
LEUKOCYTE ESTERASE UR-ACNC: NEGATIVE — SIGNIFICANT CHANGE UP
LYMPHOCYTES # BLD AUTO: 1.2 K/UL — SIGNIFICANT CHANGE UP (ref 1–3.3)
LYMPHOCYTES # BLD AUTO: 15.5 % — SIGNIFICANT CHANGE UP (ref 13–44)
MCHC RBC-ENTMCNC: 29.3 PG — SIGNIFICANT CHANGE UP (ref 27–34)
MCHC RBC-ENTMCNC: 33.2 GM/DL — SIGNIFICANT CHANGE UP (ref 32–36)
MCV RBC AUTO: 88.1 FL — SIGNIFICANT CHANGE UP (ref 80–100)
MONOCYTES # BLD AUTO: 0.6 K/UL — SIGNIFICANT CHANGE UP (ref 0–0.9)
MONOCYTES NFR BLD AUTO: 8 % — SIGNIFICANT CHANGE UP (ref 2–14)
NEUTROPHILS # BLD AUTO: 5.3 K/UL — SIGNIFICANT CHANGE UP (ref 1.8–7.4)
NEUTROPHILS NFR BLD AUTO: 70.1 % — SIGNIFICANT CHANGE UP (ref 43–77)
NITRITE UR-MCNC: NEGATIVE — SIGNIFICANT CHANGE UP
PH UR: 7 — SIGNIFICANT CHANGE UP (ref 5–8)
PLATELET # BLD AUTO: 162 K/UL — SIGNIFICANT CHANGE UP (ref 150–400)
POTASSIUM SERPL-MCNC: 3.4 MMOL/L — LOW (ref 3.5–5.3)
POTASSIUM SERPL-SCNC: 3.4 MMOL/L — LOW (ref 3.5–5.3)
PROT UR-MCNC: 30 MG/DL
RBC # BLD: 4.59 M/UL — SIGNIFICANT CHANGE UP (ref 4.2–5.8)
RBC # FLD: 13.2 % — SIGNIFICANT CHANGE UP (ref 10.3–14.5)
RBC CASTS # UR COMP ASSIST: NEGATIVE /HPF — SIGNIFICANT CHANGE UP (ref 0–4)
SODIUM SERPL-SCNC: 142 MMOL/L — SIGNIFICANT CHANGE UP (ref 135–145)
SP GR SPEC: 1 — LOW (ref 1.01–1.02)
TROPONIN I SERPL-MCNC: <0.015 NG/ML — SIGNIFICANT CHANGE UP (ref 0.01–0.04)
TROPONIN I SERPL-MCNC: <0.015 NG/ML — SIGNIFICANT CHANGE UP (ref 0.01–0.04)
UROBILINOGEN FLD QL: NEGATIVE MG/DL — SIGNIFICANT CHANGE UP
WBC # BLD: 7.6 K/UL — SIGNIFICANT CHANGE UP (ref 3.8–10.5)
WBC # FLD AUTO: 7.6 K/UL — SIGNIFICANT CHANGE UP (ref 3.8–10.5)
WBC UR QL: NEGATIVE — SIGNIFICANT CHANGE UP

## 2017-07-06 RX ORDER — AMLODIPINE BESYLATE 2.5 MG/1
5 TABLET ORAL ONCE
Qty: 0 | Refills: 0 | Status: COMPLETED | OUTPATIENT
Start: 2017-07-06 | End: 2017-07-06

## 2017-07-06 RX ORDER — DEXTROSE 50 % IN WATER 50 %
25 SYRINGE (ML) INTRAVENOUS ONCE
Qty: 0 | Refills: 0 | Status: DISCONTINUED | OUTPATIENT
Start: 2017-07-06 | End: 2017-07-10

## 2017-07-06 RX ORDER — CARBIDOPA AND LEVODOPA 25; 100 MG/1; MG/1
1 TABLET ORAL THREE TIMES A DAY
Qty: 0 | Refills: 0 | Status: DISCONTINUED | OUTPATIENT
Start: 2017-07-06 | End: 2017-07-10

## 2017-07-06 RX ORDER — SODIUM CHLORIDE 9 MG/ML
1000 INJECTION, SOLUTION INTRAVENOUS
Qty: 0 | Refills: 0 | Status: DISCONTINUED | OUTPATIENT
Start: 2017-07-06 | End: 2017-07-10

## 2017-07-06 RX ORDER — DEXTROSE 50 % IN WATER 50 %
12.5 SYRINGE (ML) INTRAVENOUS ONCE
Qty: 0 | Refills: 0 | Status: DISCONTINUED | OUTPATIENT
Start: 2017-07-06 | End: 2017-07-10

## 2017-07-06 RX ORDER — METOPROLOL TARTRATE 50 MG
100 TABLET ORAL DAILY
Qty: 0 | Refills: 0 | Status: DISCONTINUED | OUTPATIENT
Start: 2017-07-06 | End: 2017-07-10

## 2017-07-06 RX ORDER — ASPIRIN/CALCIUM CARB/MAGNESIUM 324 MG
325 TABLET ORAL ONCE
Qty: 0 | Refills: 0 | Status: COMPLETED | OUTPATIENT
Start: 2017-07-06 | End: 2017-07-06

## 2017-07-06 RX ORDER — DEXTROSE 50 % IN WATER 50 %
1 SYRINGE (ML) INTRAVENOUS ONCE
Qty: 0 | Refills: 0 | Status: DISCONTINUED | OUTPATIENT
Start: 2017-07-06 | End: 2017-07-10

## 2017-07-06 RX ORDER — ACETAMINOPHEN 500 MG
1000 TABLET ORAL ONCE
Qty: 0 | Refills: 0 | Status: COMPLETED | OUTPATIENT
Start: 2017-07-06 | End: 2017-07-06

## 2017-07-06 RX ORDER — GLUCAGON INJECTION, SOLUTION 0.5 MG/.1ML
1 INJECTION, SOLUTION SUBCUTANEOUS ONCE
Qty: 0 | Refills: 0 | Status: DISCONTINUED | OUTPATIENT
Start: 2017-07-06 | End: 2017-07-10

## 2017-07-06 RX ORDER — ATORVASTATIN CALCIUM 80 MG/1
10 TABLET, FILM COATED ORAL AT BEDTIME
Qty: 0 | Refills: 0 | Status: DISCONTINUED | OUTPATIENT
Start: 2017-07-06 | End: 2017-07-10

## 2017-07-06 RX ORDER — AMLODIPINE BESYLATE 2.5 MG/1
5 TABLET ORAL DAILY
Qty: 0 | Refills: 0 | Status: DISCONTINUED | OUTPATIENT
Start: 2017-07-07 | End: 2017-07-10

## 2017-07-06 RX ORDER — CLOPIDOGREL BISULFATE 75 MG/1
75 TABLET, FILM COATED ORAL DAILY
Qty: 0 | Refills: 0 | Status: DISCONTINUED | OUTPATIENT
Start: 2017-07-06 | End: 2017-07-10

## 2017-07-06 RX ORDER — ASPIRIN/CALCIUM CARB/MAGNESIUM 324 MG
81 TABLET ORAL DAILY
Qty: 0 | Refills: 0 | Status: DISCONTINUED | OUTPATIENT
Start: 2017-07-06 | End: 2017-07-10

## 2017-07-06 RX ORDER — HEPARIN SODIUM 5000 [USP'U]/ML
5000 INJECTION INTRAVENOUS; SUBCUTANEOUS EVERY 8 HOURS
Qty: 0 | Refills: 0 | Status: DISCONTINUED | OUTPATIENT
Start: 2017-07-06 | End: 2017-07-10

## 2017-07-06 RX ORDER — ACETAMINOPHEN 500 MG
650 TABLET ORAL EVERY 6 HOURS
Qty: 0 | Refills: 0 | Status: DISCONTINUED | OUTPATIENT
Start: 2017-07-06 | End: 2017-07-10

## 2017-07-06 RX ORDER — INSULIN LISPRO 100/ML
VIAL (ML) SUBCUTANEOUS
Qty: 0 | Refills: 0 | Status: DISCONTINUED | OUTPATIENT
Start: 2017-07-06 | End: 2017-07-10

## 2017-07-06 RX ADMIN — AMLODIPINE BESYLATE 5 MILLIGRAM(S): 2.5 TABLET ORAL at 06:17

## 2017-07-06 RX ADMIN — CLOPIDOGREL BISULFATE 75 MILLIGRAM(S): 75 TABLET, FILM COATED ORAL at 12:24

## 2017-07-06 RX ADMIN — ATORVASTATIN CALCIUM 10 MILLIGRAM(S): 80 TABLET, FILM COATED ORAL at 21:51

## 2017-07-06 RX ADMIN — CARBIDOPA AND LEVODOPA 1 TABLET(S): 25; 100 TABLET ORAL at 06:17

## 2017-07-06 RX ADMIN — Medication 81 MILLIGRAM(S): at 12:24

## 2017-07-06 RX ADMIN — CARBIDOPA AND LEVODOPA 1 TABLET(S): 25; 100 TABLET ORAL at 21:51

## 2017-07-06 RX ADMIN — HEPARIN SODIUM 5000 UNIT(S): 5000 INJECTION INTRAVENOUS; SUBCUTANEOUS at 14:24

## 2017-07-06 RX ADMIN — CARBIDOPA AND LEVODOPA 1 TABLET(S): 25; 100 TABLET ORAL at 14:23

## 2017-07-06 RX ADMIN — HEPARIN SODIUM 5000 UNIT(S): 5000 INJECTION INTRAVENOUS; SUBCUTANEOUS at 21:51

## 2017-07-06 RX ADMIN — HEPARIN SODIUM 5000 UNIT(S): 5000 INJECTION INTRAVENOUS; SUBCUTANEOUS at 06:18

## 2017-07-06 RX ADMIN — Medication 325 MILLIGRAM(S): at 03:57

## 2017-07-06 RX ADMIN — Medication 100 MILLIGRAM(S): at 06:18

## 2017-07-06 RX ADMIN — Medication 1000 MILLIGRAM(S): at 04:40

## 2017-07-06 NOTE — ED ADULT NURSE REASSESSMENT NOTE - NS ED NURSE REASSESS COMMENT FT1
Pt changed and repositioned for comfort, pt and pt family made aware as to plan for admission and rationale. Pt and pt family verbalize understanding of plan of care. Pt family given recliner chair with pillows and blankets for comfort. Call bell within reach, pt on bedside monitor. Pt states after receiving nitro he has had decreased chest pain. Pt and family updated as to laboratory results and verbalize understanding. Hospitalist OPHELIA Wilson at pt bedside evaluating pt for admission, awaiting admission orders. Pt asleep at this time in no apparent distress. Will continue to monitor.
Patient, sheets and linens changed, new warm blanket applied, pt repositioned for comfort. Urine collected and sent as per MD order. MD De La Rosa re-evaluated pt and completing admission orders. Pt wife given warm blanket. Pt and family verbalize understanding of need for admission and have no questions at this time. Pt remains on bedside monitor with call bell within reach. Will continue to monitor.

## 2017-07-06 NOTE — H&P ADULT - PROBLEM SELECTOR PLAN 1
- admit to tele  - serial EKGs  - trend trops  - cards consult,  - c/w home dose ASA, metoprolol, statin  - almodipine - c/w home dose ASA, metoprolol, statin  - amlodipine

## 2017-07-06 NOTE — H&P ADULT - ASSESSMENT
88 yo M with PMHx Parkinson's, CAD, DM, CKD stage 3 with chest pain, SOB, neck pain in the setting 89 yo M with PMHx Parkinson's, CAD, DM, CKD stage 3 with chest pain, SOB, neck pain in the setting elevated blood pressure. 88 yo M with PMHx Parkinson's, CAD, DM, CKD stage 3 with chest pain, SOB, neck pain in the setting of elevated blood pressure is most likely due to hypertensive urgency. Admitted to rule out MI.

## 2017-07-06 NOTE — H&P ADULT - HISTORY OF PRESENT ILLNESS
89 yo M with PMHx of HTN, CAD, CKD stage 3 presenting with chest pain and SOB. 89 yo M with PMHx of Parkinson's disease, HTN, CAD, DM, CKD stage 3 and frequent falls presenting with chest pain. The chest pain is retrosternal with a severity of 4/10 and is associated with SOB. He also stated he had a heavy feeling in his legs. This caused him to almost fall but he did not. He denies LOC or head trauma. The pain was associated with pain in the back of his head. 88 yo M with PMHx of Parkinson's disease, HTN, CAD, DM, CKD stage 3 and frequent falls presenting with chest pain. The chest pain is retrosternal with a severity of 4/10 and is associated with SOB. He also stated he had a heavy feeling in his legs. This caused him to almost fall but he did not. He denies LOC or head trauma. The pain was associated with pain in the back of his head. He denied any changes in vision. Of note, patient had an echo in February with an EF of 72%. It also showed defects in the mid and basal aspect of the wall. 89 yo M with PMHx of Parkinson's disease, HTN, CAD, DM, CKD stage 3 and frequent falls presenting with chest pain. The chest pain is retrosternal with a severity of 4/10 and is associated with SOB. He also stated he had a heavy feeling in his legs. This caused him to almost fall but he did not. He denies LOC or head trauma. The pain was associated with pain in the back of his head. He denied any changes in vision. He is fully compliant with his medications.     Of note, patient had an echo in February with an EF of 72%. It also showed defects in the mid and basal aspect of the wall.

## 2017-07-06 NOTE — H&P ADULT - PROBLEM/PLAN-3
4/10/2017       RE: Stacey La  [address]  [facility] [id number]     Dear Colleague,    Thank you for referring your patient, Stacey La, to the [facility] at [facility]. Please see a copy of my visit note below.    HPI:  Stacey is a 24 yo woman here for follow up of type 1 diabetes, diagnosed at age 9.   She also sees Dr. Allan.  Stacey's diabetes is complicated by nephropathy. She also has hyperlipidemia, a history of hypothyroidism (although she has been off of levothyroxine for several years) and celiac disease.  She follows a strict gluten free diet.  She has been working hard at improving her control, testing more, wearing her Dexcom sensor regularly.  She and her  would like to start trying to conceive soon.  A few months ago, however, she decided to go back to injections as she was getting tired of wearing her pump.  She had a much harder time controlling her blood sugars and she went back on her pump 3 weeks ago.  She thinks her numbers have improved since then.     She uses the T-slim pump with the integrated sensor.  She likes this. She has been having very little hypoglyemia, but sometimes doesn't feel it until she is in the 40's.     Stacey tests her blood sugar 4 times daily with an overall average this month of 203 mg/dL on her meter, 188 mg/dL on her sensor.  She tends to be highest at HS, through the night,  and in the morning.      Stacey wears a t-slim pump with basal rates as follows:   Mid- 2.25  3am- 2.1  7am- 1.85  10am- 1.95    IC ratio-   Mid-1:4g   3am- 1:5g   11am- 1:6g    Sensitivity- 18  Target glucose- 100 during the day, 110 overnight    Average total daily insulin dose-  125 Units (40%basal)    She is having to do a dual wave bolus to prevent leaking when she takes over 10 units on her pump at a time. She has tried multiple different types of insertion sets and rotating insertion sites with no  improvement.    For her hypothyroidism- just restarted levothyroxine 50 mcg daily in October, 2016 in anticipation of attempting pregnancy.  She reports that her hypothyroidism had previously resolved on its own and had been off levothyroxine for a couple years.       For her dyslipidemia- we stopped pravastatin at her last appointment in anticipation of pregnancy.       For her CKD (stage 1), she is taking enalapril. She follows with nephrology.  She understands she will need to stop this if she were to become pregnant.    A1c today is up from 6.9% to 7.6%.     ROS  GENERAL: no weight loss, weight gain, fevers, chills, malaise, night sweats.   HEENT: no dysphagia, diplopia, neck pain or tenderness, dry/scratchy eyes, URI, cough, sinus drainage, tinnitus, sinus pressure  CV: no chest pain, pressure, palpitations, skipped beats, LOC  LUNGS: no SOB, CHILDRESS, cough, sputum production, wheezing   GI: no diarrhea, constipation, abdominal pain  EXTREMITIES: no rashes, ulcers, edema  NEUROLOGY: no changes in vision, tingling or numbness in hands or feet.   MSK: no muscle aches or pains, weakness  PSYCH: mood stable    Past Medical History:   Diagnosis Date     Asthma     Currently no treatment needed     Celiac disease      Chronic kidney disease, stage I 8/3/2015     Chronic sinusitis      Diabetes mellitus type 1 (H)      Diabetic nephropathy (H)      Family history of heart disease 8/3/2015     Hypertension      Hypothyroid      Pedal edema      Psoriasis      PMH:   Type 1 diabetes- since 2003  Celiac disease- since age 15  Hypothyroidism- age 12 (no longer on levothyroxine for several years)  Hyperlipidemia- has been on a statin for a little more than a year, mostly eating a plant based diet now.    Past Surgical History:   Procedure Laterality Date     ENT SURGERY       TONSILLECTOMY, ADENOIDECTOMY, COMBINED         Family History   Problem Relation Age of Onset     Cardiovascular Father 48     MI, stents, diabetic, type  2     GASTROINTESTINAL DISEASE Mother      Celiacs     Cardiovascular Maternal Grandfather      Englarged heart, pacemaker, defib     Family Hx:   Dad- premature CVD, in his 40s.  Type 2 diabetes  Mom- celiac disease  Grandfather- type 2 diabetes  Half brother- Asperger's  Half sister- cervical CA  Another half brother- bipolar and schizophrenia      History     Social History     Marital Status:      Spouse Name: N/A     Number of Children: N/A     Years of Education: N/A     Social History Main Topics     Smoking status: Never Smoker      Smokeless tobacco: Never Used     Alcohol Use: Yes      Comment: occ. use     Drug Use: No     Sexual Activity:     Partners: Male     Birth Control/ Protection: Condom     Other Topics Concern     Parent/Sibling W/ Cabg, Mi Or Angioplasty Before 65f 55m? Yes     Caffeine Concern Yes     1 cup tea per day     Sleep Concern No     Special Diet Yes     gluten free diet, low carbs     Exercise Yes     walking, ellyptical, swimming, weights     Seat Belt Yes     Social History Narrative     Social Hx:   . 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  Also taking classes at night studying nutrition. Had previously worked as a nanny. She also goes to the gym a few days a week.  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     Current Outpatient Prescriptions   Medication     insulin lispro (HUMALOG) 100 UNIT/ML injection     enalapril (VASOTEC) 20 MG tablet     albuterol (2.5 MG/3ML) 0.083% neb solution     hydrOXYzine (VISTARIL) 25 MG capsule     Blood Pressure Monitoring KIT     levothyroxine (SYNTHROID, LEVOTHROID) 50 MCG tablet     Ferrous Sulfate (IRON SUPPLEMENT PO)     escitalopram (LEXAPRO) 10 MG tablet     Ondansetron HCl (ZOFRAN PO)     albuterol (ALBUTEROL) 108 (90 BASE) MCG/ACT inhaler     Cholecalciferol 2000 UNITS TBDP     prochlorperazine (COMPAZINE) 10 MG tablet     blood glucose test strip     insulin syringes, disposable, U-100 0.3 ML  "MISC     No current facility-administered medications for this visit.           Allergies   Allergen Reactions     Dogs Other (See Comments)     Sinus symptoms      Dust Mites      Sinus      Gluten Meal      RESULTS  Lab Results   Component Value Date    A1C 7.6 03/26/2015       TSH   Date Value Ref Range Status   10/10/2016 2.89 0.40 - 4.00 mU/L Final   01/16/2016 3.33 0.40 - 4.00 mU/L Final   12/09/2013 2.95 0.4 - 5.0 mU/L Final     T4 Free   Date Value Ref Range Status   12/09/2013 1.18 0.70 - 1.85 ng/dL Final       Creatinine   Date Value Ref Range Status   01/24/2017 0.55 0.52 - 1.04 mg/dL Final   07/26/2016 0.60 0.52 - 1.04 mg/dL Final   ]    No results found for: MICROALBUMIN]    ALT   Date Value Ref Range Status   02/20/2016 26 0 - 50 U/L Final   08/04/2015 <5 (L) 5 - 30 U/L Final   ]    Recent Labs   Lab Test  08/04/15   1443  12/09/13   1137   CHOL  194  241*   HDL  66  66   LDL  102  142*   TRIG  131  165*   CHOLHDLRATIO  2.9  3.7         Physical Exam  /82  Pulse 99  Ht 1.727 m (5' 8\")  Wt 131.5 kg (290 lb)  BMI 44.09 kg/m2  GENERAL:  Alert and oriented X3, NAD, well dressed, answering questions appropriately, appears stated age.  EXTREMITIES: no edema, +pulses, no rashes, no lesions    RESULTS  Lab Results   Component Value Date    A1C 7.6 03/26/2015       TSH   Date Value Ref Range Status   10/10/2016 2.89 0.40 - 4.00 mU/L Final   01/16/2016 3.33 0.40 - 4.00 mU/L Final   12/09/2013 2.95 0.4 - 5.0 mU/L Final     T4 Free   Date Value Ref Range Status   12/09/2013 1.18 0.70 - 1.85 ng/dL Final       Creatinine   Date Value Ref Range Status   01/24/2017 0.55 0.52 - 1.04 mg/dL Final   07/26/2016 0.60 0.52 - 1.04 mg/dL Final   ]    No results found for: MICROALBUMIN]    ALT   Date Value Ref Range Status   02/20/2016 26 0 - 50 U/L Final   08/04/2015 <5 (L) 5 - 30 U/L Final   ]    Recent Labs   Lab Test  08/04/15   1443  12/09/13   1137   CHOL  194  241*   HDL  66  66   LDL  102  142*   TRIG  131  " 165*   CHOLHDLRATIO  2.9  3.7               Parathyroid Hormone Intact   Date Value Ref Range Status   01/24/2017 24 12 - 72 pg/mL Final   01/16/2016 31 12 - 72 pg/mL Final     Calcium   Date Value Ref Range Status   01/24/2017 9.3 8.5 - 10.1 mg/dL Final   07/26/2016 9.2 8.5 - 10.1 mg/dL Final     No results found for: CALCIUMIONIZ  Albumin   Date Value Ref Range Status   01/24/2017 4.0 3.4 - 5.0 g/dL Final   07/26/2016 3.6 3.4 - 5.0 g/dL Final       A1c today: 7.6%.  Assessment/Plan:     1.  Type 1 diabetes-  Blood sugars have climbed since switching from the pump to injections.  She has been back on the pump for the past 3 weeks and numbers are looking better.  She is, however, going too high post-dinner and staying high all night.  Will make the following changes:     New basal rates:   Mid- 2.45 (up from 2.35)  3am- 2.3 (up from 2.2)  7am- 1.85  11am- 1.95    Tighten insulin:carb ratio to 1:5g starting at 6pm.    We again reviewed optimal glucose control prior to attempting pregnancy.     2.  Risk factors-     Retinopathy:  No.  Had eye exam within last year.   Nephropathy:  BP well controlled. No microalbuminuria.  Creatinine stable.  She is on enalapril currently, but she understands she will need to stop this if she becomes pregnant.     Neuropathy: No.    Feet: OK, no ulcers.   Lipids:  .  Had been on pravastatin 20 mg daily.  She stopped statin in anticipation of pregnancy.        3.  Hypothyroidism- last TSH was 2.89 on 10/10/16 on no levothyroxine.   She did resume 50 mcg daily at that time in anticipation of pregnancy.  Will recheck TSH.     4. F/U in 3 months with Dr. Allan, sooner if she were to become pregnant.     25/30 minute visit was spent counseling patient.     Laureen Goldstein PA-C, MPAS   [facility]  Department of Medicine  Division of Endocrinology and Diabetes                 DISPLAY PLAN FREE TEXT

## 2017-07-06 NOTE — H&P ADULT - ATTENDING COMMENTS
89 y/o M with PMHx of Parkinson's disease, HTN, CAD, DM2, CKD3, and frequent falls presents to the ED w/ c/o midsternal chest pain, 4/10 in intensity, associated w/ shortness of breath. In the ED the patient was found to have /98.    1)HTN Urgency   ~admit to Telemetry  ~serial Jin  ~f/u w/ Cardiology  ~cont. Metoprolol  ~will add Amlodipine    2)Parkinsons Disease  ~cont. Sinemet    3)DM2  ~FS qAC/HS  ~cont. ISS per protocol    4)Vte ppx  ~cont. Heparin sq

## 2017-07-06 NOTE — H&P ADULT - NSHPPHYSICALEXAM_GEN_ALL_CORE
Constitutional: pleasant elderly male laying comfortably in bed  Cardio: Regular rate and rhythm. S1/S2 present.  Respiratory: Respirations non-labored (no accessory muscle use); clear to auscultation  Gastro: Soft, non-tender, non-distended. Bowel sounds present. No masses. No hepatosplenomegaly.  Vascular: dorsalis pedis and posterior tibial pulses present bilaterally.  Extremities: No clubbing or cyanosis. No lower extremity edema, erythema or tenderness.  Neuro: AAO *3. No sensory, motor or coordination deficits, normal reflexes.

## 2017-07-06 NOTE — CONSULT NOTE ADULT - SUBJECTIVE AND OBJECTIVE BOX
Consult note dictated and is in HH portal for review.  Computer access 7/6/17 was not available due to computer malfunction. Consult was done and dictated 7/6/17.

## 2017-07-06 NOTE — CONSULT NOTE ADULT - ASSESSMENT
See dictated consult in  portal.  On my exam no definite chest pain. Admitting H/P was NA for review yesterday. In any case, enz neg and no typical angina by Hx. Tx of angina is SL NTGPRN  Conserv Tx appropriate.    P: discussed with patient and RN. Contin observation and re-evaluate all computer data and condition 7/7/17.

## 2017-07-07 PROCEDURE — 93280 PM DEVICE PROGR EVAL DUAL: CPT | Mod: 26

## 2017-07-07 RX ADMIN — HEPARIN SODIUM 5000 UNIT(S): 5000 INJECTION INTRAVENOUS; SUBCUTANEOUS at 22:10

## 2017-07-07 RX ADMIN — CARBIDOPA AND LEVODOPA 1 TABLET(S): 25; 100 TABLET ORAL at 05:44

## 2017-07-07 RX ADMIN — Medication 100 MILLIGRAM(S): at 05:44

## 2017-07-07 RX ADMIN — ATORVASTATIN CALCIUM 10 MILLIGRAM(S): 80 TABLET, FILM COATED ORAL at 22:10

## 2017-07-07 RX ADMIN — AMLODIPINE BESYLATE 5 MILLIGRAM(S): 2.5 TABLET ORAL at 05:44

## 2017-07-07 RX ADMIN — CARBIDOPA AND LEVODOPA 1 TABLET(S): 25; 100 TABLET ORAL at 22:10

## 2017-07-07 RX ADMIN — CARBIDOPA AND LEVODOPA 1 TABLET(S): 25; 100 TABLET ORAL at 14:00

## 2017-07-07 RX ADMIN — HEPARIN SODIUM 5000 UNIT(S): 5000 INJECTION INTRAVENOUS; SUBCUTANEOUS at 05:43

## 2017-07-07 RX ADMIN — Medication 2: at 09:19

## 2017-07-07 RX ADMIN — HEPARIN SODIUM 5000 UNIT(S): 5000 INJECTION INTRAVENOUS; SUBCUTANEOUS at 14:01

## 2017-07-07 RX ADMIN — Medication 81 MILLIGRAM(S): at 13:59

## 2017-07-07 RX ADMIN — CLOPIDOGREL BISULFATE 75 MILLIGRAM(S): 75 TABLET, FILM COATED ORAL at 14:00

## 2017-07-07 NOTE — PROGRESS NOTE ADULT - SUBJECTIVE AND OBJECTIVE BOX
cc: leg delia, chest pain; when i saw him he told me the only thing that brought him to [facility] whas PD related, legs pain now gone      89 yo M with PMHx of Parkinson's disease, HTN, CAD, DM, CKD stage 3 and frequent falls presenting with chest pain. The chest pain is retrosternal with a severity of 4/10 and is associated with SOB. He also stated he had a heavy feeling in his legs. This caused him to almost fall but he did not. He denies LOC or head trauma. The pain was associated with pain in the back of his head. BP syst 200 on adm    Of note, patient had an echo in February with an EF of 72%. It also showed defects in the mid and basal aspect of the wall.    Vital Signs Last 24 Hrs  T(C): 36.6 (07-07-17 @ 05:25), Max: 36.6 (07-07-17 @ 05:25)  T(F): 97.8 (07-07-17 @ 05:25), Max: 97.8 (07-07-17 @ 05:25)  HR: 77 (07-07-17 @ 05:25) (66 - 77)  BP: 141/61 (07-07-17 @ 05:25) (112/49 - 141/61)  BP(mean): --  RR: 17 (07-06-17 @ 11:33) (17 - 17)  SpO2: 95% (07-07-17 @ 05:25) (95% - 98%)    PHYSICAL EXAM:      Constitutional: WD, NAD    Neck: No JVD    Respiratory: Lungs clear    Cardiovascular: RRR 2/6 MADIHA    Gastrointestinal: Abd non tender    Extremities: No calf tend.    Vascular: No edema    Neurological: Alert and lucid. No current tremor. Able to move both lower extremities.    Skin: No gen rash    Musculoskeletal: No muscle tend    Psychiatric: calm and flat affect.        1)HTN Urgency; no evid of ischemia  ~ resolved  ~cont. Metoprolol  ~will add Amlodipine    2)Parkinsons Disease  ~cont. Sinemet    3)DM2  ~FS qAC/HS  ~cont. ISS per protocol    4)Vte ppx  ~cont. Heparin sq.     Rehab next

## 2017-07-07 NOTE — PROGRESS NOTE ADULT - ASSESSMENT
A: No MI. No current Sx angina.  Asx VPC. PPM function under EP service eval. they follow PPM when he is home too.  BP ok. K needs correction by H-MD  ASHD and no typical Sx. SL NTG prn remains my best advice as long as it is tolerated.  Rehab today seems reasonable if medically stable to go.    P: SHAYLEE-MD to evaluate and determine if medically ok to go to rehab today. I do not object.  Should have neuro evaluation done (if not here then once home).  Fall precautions advised. SL NTG prn for chest PAIN if tolerated.  No change in CVD meds from PTA advised.  Recommend my consult dictated in  portal be reviewed.  FU by PCP once home as he will make a housecall.  CVD FU once home as able to do so and patient is aware (if he can get transport).  No change in Tx for VPC other than K correction.  Discussed activity and fall precautions, rehab and FU, CVD CP and symptoms to merit ER or medical attention with patient.

## 2017-07-07 NOTE — PROGRESS NOTE ADULT - SUBJECTIVE AND OBJECTIVE BOX
S: Evaluated ASHD, HTN, arrhythmias, PTA chest pain symptoms. Patient says when he came he did not have pain but an empty feeling in chest. ER MD reports in ER he reported CP. In any case he had a neg P-MPI for ischemia in Early 2017 with old IWMI and EF 72%. At that point it was decided to treat his occasional activity associated CP with sl NTG PRN and rarely he has used it with reported success although whether the NTG actually relieved such pain or whether it spontaneously went away after using sl NTG is unknown. In any case he does not have typical angina with activity with any frequency and given age and co-morbid conditions I have advised conservative management of CVD. I do not feel he is a candidate for invasive evaluation and would not advise a repeat P-MPI at this time. He has not had any pain here and enzymes are negative. SL NTG prn remains appropriate (he says he did not take it when he had symptoms prompting him to go to ER since he did not have any PAIN but just empty feeling for which he would not use NTG).  His main problem has been imbalance and inability to ambulate likely from Parkinson's. He has not had neuro re-evaluation done here. Perhaps he can do so once home again after rehab. He expects he will go back to rehab again now.  ASHFORD reported with the extra effort he uses to try to ambulate. No clinical sign of CHF here. CXR clear and PE negative for rales. No edema and he has been comfortable yesterday even lying flat and also today in chair. No symptoms to suggest PE or phlebitis or DVT.  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 Erratic pacer spikes seen and reported so I asked Luciana Jimenez from EP (NP) tsee him for PPM evaluation with EP MD FU as required. Asymptomatic VPC seen on monitor as on yesterday. Admitting ECG suggested v-pacing but it is a SV rhythm with bifascicular AVB (RBBB and LAHB). when he has V pacing he has a LBBB pattern which I have not seen since he is here.  BP is ok. Creatinine elevation has come down to normal. K needs hospitalist correction today.  Patient is comfortable and wants to go to rehab again today. O2 sat is ok.              Vital Signs Last 24 Hrs  T(C): 36.6 (07-07-17 @ 05:25), Max: 36.6 (07-07-17 @ 05:25)  T(F): 97.8 (07-07-17 @ 05:25), Max: 97.8 (07-07-17 @ 05:25)  HR: 77 (07-07-17 @ 05:25) (66 - 77)  BP: 141/61 (07-07-17 @ 05:25) (112/49 - 141/61)  BP(mean): --  RR: 17 (07-06-17 @ 11:33) (17 - 17)  SpO2: 95% (07-07-17 @ 05:25) (95% - 98%)    PHYSICAL EXAM:      Constitutional: WD, NAD    Neck: No JVD    Respiratory: Lungs clear    Cardiovascular: RRR 2/6 MADIHA    Gastrointestinal: Abd non tender    Extremities: No calf tend.    Vascular: No edema    Neurological: Alert and lucid. No current tremor. Able to move both lower extremities.    Skin: No gen rash    Musculoskeletal: No muscle tend    Psychiatric: calm and flat affect.

## 2017-07-07 NOTE — PHYSICAL THERAPY INITIAL EVALUATION ADULT - CRITERIA FOR SKILLED THERAPEUTIC INTERVENTIONS
rehab potential/risk reduction/prevention/predicted duration of therapy intervention/anticipated discharge recommendation/functional limitations in following categories/impairments found/therapy frequency/anticipated equipment needs at discharge

## 2017-07-07 NOTE — PHYSICAL THERAPY INITIAL EVALUATION ADULT - DISCHARGE DISPOSITION, PT EVAL
patient in agreement and requests [facility]/rehabilitation [facility]

## 2017-07-07 NOTE — PHYSICAL THERAPY INITIAL EVALUATION ADULT - PLANNED THERAPY INTERVENTIONS, PT EVAL
bed mobility training/strengthening/gait training/balance training/ROM/postural re-education/transfer training

## 2017-07-07 NOTE — PHYSICAL THERAPY INITIAL EVALUATION ADULT - ACTIVE RANGE OF MOTION EXAMINATION, REHAB EVAL
A/AAROM/bilateral  lower extremity Active ROM was WFL (within functional limits)/bilateral upper extremity Active ROM was WFL (within functional limits)

## 2017-07-08 RX ADMIN — HEPARIN SODIUM 5000 UNIT(S): 5000 INJECTION INTRAVENOUS; SUBCUTANEOUS at 05:51

## 2017-07-08 RX ADMIN — CARBIDOPA AND LEVODOPA 1 TABLET(S): 25; 100 TABLET ORAL at 05:51

## 2017-07-08 RX ADMIN — Medication 100 MILLIGRAM(S): at 05:51

## 2017-07-08 RX ADMIN — HEPARIN SODIUM 5000 UNIT(S): 5000 INJECTION INTRAVENOUS; SUBCUTANEOUS at 13:51

## 2017-07-08 RX ADMIN — HEPARIN SODIUM 5000 UNIT(S): 5000 INJECTION INTRAVENOUS; SUBCUTANEOUS at 21:02

## 2017-07-08 RX ADMIN — Medication 81 MILLIGRAM(S): at 11:25

## 2017-07-08 RX ADMIN — AMLODIPINE BESYLATE 5 MILLIGRAM(S): 2.5 TABLET ORAL at 05:51

## 2017-07-08 RX ADMIN — CLOPIDOGREL BISULFATE 75 MILLIGRAM(S): 75 TABLET, FILM COATED ORAL at 11:25

## 2017-07-08 RX ADMIN — CARBIDOPA AND LEVODOPA 1 TABLET(S): 25; 100 TABLET ORAL at 21:02

## 2017-07-08 RX ADMIN — ATORVASTATIN CALCIUM 10 MILLIGRAM(S): 80 TABLET, FILM COATED ORAL at 21:02

## 2017-07-08 RX ADMIN — CARBIDOPA AND LEVODOPA 1 TABLET(S): 25; 100 TABLET ORAL at 13:51

## 2017-07-08 NOTE — PROGRESS NOTE ADULT - SUBJECTIVE AND OBJECTIVE BOX
cc: leg delia, chest pain; when i saw him he told me the only thing that brought him to [facility] whas PD related, legs pain now gone      89 yo M with PMHx of Parkinson's disease, HTN, CAD, DM, CKD stage 3 and frequent falls presenting with chest pain. The chest pain is retrosternal with a severity of 4/10 and is associated with SOB. He also stated he had a heavy feeling in his legs. This caused him to almost fall but he did not. He denies LOC or head trauma. The pain was associated with pain in the back of his head. BP syst 200 on adm    Of note, patient had an echo in February with an EF of 72%. It also showed defects in the mid and basal aspect of the wall. Stable while here; will go to rehab    Vital Signs Last 24 Hrs  T(C): 36.8 (08 Jul 2017 11:23), Max: 36.8 (08 Jul 2017 11:23)  T(F): 98.2 (08 Jul 2017 11:23), Max: 98.2 (08 Jul 2017 11:23)  HR: 62 (08 Jul 2017 11:23) (62 - 80)  BP: 122/53 (08 Jul 2017 11:23) (122/53 - 168/75)  BP(mean): --  RR: 14 (08 Jul 2017 11:23) (14 - 18)  SpO2: 95% (08 Jul 2017 11:23) (94% - 100%)      PHYSICAL EXAM:      Constitutional: WD, NAD    Neck: No JVD    Respiratory: Lungs clear    Cardiovascular: RRR 2/6 MADIHA    Gastrointestinal: Abd non tender    Extremities: No calf tend.    Vascular: No edema    Neurological: Alert and lucid. No current tremor. Able to move both lower extremities.    Skin: No gen rash    Musculoskeletal: No muscle tend    Psychiatric: calm and flat affect.        1)HTN Urgency; no evid of ischemia  ~ resolved  ~cont. Metoprolol  ~Amlodipine    2)Parkinsons Disease  ~cont. Sinemet    3)DM2  ~FS qAC/HS  ~cont. ISS per protocol    4)Vte ppx  ~cont. Heparin sq.     Rehab next

## 2017-07-09 LAB
ANION GAP SERPL CALC-SCNC: 7 MMOL/L — SIGNIFICANT CHANGE UP (ref 5–17)
BUN SERPL-MCNC: 33 MG/DL — HIGH (ref 7–23)
CALCIUM SERPL-MCNC: 9.2 MG/DL — SIGNIFICANT CHANGE UP (ref 8.5–10.1)
CHLORIDE SERPL-SCNC: 109 MMOL/L — HIGH (ref 96–108)
CO2 SERPL-SCNC: 28 MMOL/L — SIGNIFICANT CHANGE UP (ref 22–31)
CREAT SERPL-MCNC: 1.53 MG/DL — HIGH (ref 0.5–1.3)
GLUCOSE SERPL-MCNC: 124 MG/DL — HIGH (ref 70–99)
POTASSIUM SERPL-MCNC: 3.6 MMOL/L — SIGNIFICANT CHANGE UP (ref 3.5–5.3)
POTASSIUM SERPL-SCNC: 3.6 MMOL/L — SIGNIFICANT CHANGE UP (ref 3.5–5.3)
SODIUM SERPL-SCNC: 144 MMOL/L — SIGNIFICANT CHANGE UP (ref 135–145)

## 2017-07-09 RX ORDER — ACETAMINOPHEN 500 MG
650 TABLET ORAL EVERY 6 HOURS
Qty: 0 | Refills: 0 | Status: DISCONTINUED | OUTPATIENT
Start: 2017-07-09 | End: 2017-07-10

## 2017-07-09 RX ORDER — SODIUM CHLORIDE 9 MG/ML
1000 INJECTION INTRAMUSCULAR; INTRAVENOUS; SUBCUTANEOUS
Qty: 0 | Refills: 0 | Status: DISCONTINUED | OUTPATIENT
Start: 2017-07-09 | End: 2017-07-10

## 2017-07-09 RX ADMIN — HEPARIN SODIUM 5000 UNIT(S): 5000 INJECTION INTRAVENOUS; SUBCUTANEOUS at 05:23

## 2017-07-09 RX ADMIN — HEPARIN SODIUM 5000 UNIT(S): 5000 INJECTION INTRAVENOUS; SUBCUTANEOUS at 21:05

## 2017-07-09 RX ADMIN — CARBIDOPA AND LEVODOPA 1 TABLET(S): 25; 100 TABLET ORAL at 13:25

## 2017-07-09 RX ADMIN — HEPARIN SODIUM 5000 UNIT(S): 5000 INJECTION INTRAVENOUS; SUBCUTANEOUS at 13:25

## 2017-07-09 RX ADMIN — ATORVASTATIN CALCIUM 10 MILLIGRAM(S): 80 TABLET, FILM COATED ORAL at 21:05

## 2017-07-09 RX ADMIN — Medication 650 MILLIGRAM(S): at 21:05

## 2017-07-09 RX ADMIN — Medication 81 MILLIGRAM(S): at 11:12

## 2017-07-09 RX ADMIN — Medication 650 MILLIGRAM(S): at 23:25

## 2017-07-09 RX ADMIN — CARBIDOPA AND LEVODOPA 1 TABLET(S): 25; 100 TABLET ORAL at 21:05

## 2017-07-09 RX ADMIN — Medication 100 MILLIGRAM(S): at 05:24

## 2017-07-09 RX ADMIN — Medication 2: at 11:12

## 2017-07-09 RX ADMIN — CARBIDOPA AND LEVODOPA 1 TABLET(S): 25; 100 TABLET ORAL at 05:24

## 2017-07-09 RX ADMIN — SODIUM CHLORIDE 75 MILLILITER(S): 9 INJECTION INTRAMUSCULAR; INTRAVENOUS; SUBCUTANEOUS at 12:23

## 2017-07-09 RX ADMIN — AMLODIPINE BESYLATE 5 MILLIGRAM(S): 2.5 TABLET ORAL at 05:24

## 2017-07-09 RX ADMIN — CLOPIDOGREL BISULFATE 75 MILLIGRAM(S): 75 TABLET, FILM COATED ORAL at 11:12

## 2017-07-09 NOTE — PROGRESS NOTE ADULT - SUBJECTIVE AND OBJECTIVE BOX
cc: leg delia, chest pain; when i saw him he told me the only thing that brought him to [facility] whas PD related, legs pain now gone      88 yo M with PMHx of Parkinson's disease, HTN, CAD, DM, CKD stage 3 and frequent falls presenting with chest pain. The chest pain is retrosternal with a severity of 4/10 and is associated with SOB. He also stated he had a heavy feeling in his legs. This caused him to almost fall but he did not. He denies LOC or head trauma. The pain was associated with pain in the back of his head. BP syst 200 on adm    Of note, patient had an echo in February with an EF of 72%. It also showed defects in the mid and basal aspect of the wall. Stable while here; will go to rehab    Vital Signs Last 24 Hrs  T(C): 36.5 (09 Jul 2017 11:22), Max: 36.5 (09 Jul 2017 05:07)  T(F): 97.7 (09 Jul 2017 11:22), Max: 97.7 (09 Jul 2017 05:07)  HR: 66 (09 Jul 2017 11:22) (66 - 71)  BP: 137/65 (09 Jul 2017 11:22) (110/52 - 164/70)  BP(mean): --  RR: 18 (09 Jul 2017 11:22) (15 - 18)  SpO2: 98% (09 Jul 2017 11:22) (96% - 98%)      PHYSICAL EXAM:      Constitutional: WD, NAD    Neck: No JVD    Respiratory: Lungs clear    Cardiovascular: RRR 2/6 MADIHA    Gastrointestinal: Abd non tender    Extremities: No calf tend.    Vascular: No edema    Neurological: Alert and lucid. No current tremor. Able to move both lower extremities.    Skin: No gen rash    Musculoskeletal: No muscle tend    Psychiatric: calm and flat affect.        1)HTN Urgency; no evid of ischemia  ~ resolved  ~cont. Metoprolol  ~Amlodipine    2)Parkinsons Disease  ~cont. Sinemet    3)DM2  ~FS qAC/HS  ~cont. ISS per protocol    4)Vte ppx  ~cont. Heparin sq.     Rehab next

## 2017-07-10 ENCOUNTER — TRANSCRIPTION ENCOUNTER (OUTPATIENT)
Age: 82
End: 2017-07-10

## 2017-07-10 VITALS
DIASTOLIC BLOOD PRESSURE: 67 MMHG | SYSTOLIC BLOOD PRESSURE: 131 MMHG | OXYGEN SATURATION: 100 % | HEART RATE: 67 BPM | RESPIRATION RATE: 17 BRPM | TEMPERATURE: 98 F

## 2017-07-10 LAB
ANION GAP SERPL CALC-SCNC: 6 MMOL/L — SIGNIFICANT CHANGE UP (ref 5–17)
BUN SERPL-MCNC: 29 MG/DL — HIGH (ref 7–23)
CALCIUM SERPL-MCNC: 9.2 MG/DL — SIGNIFICANT CHANGE UP (ref 8.5–10.1)
CHLORIDE SERPL-SCNC: 110 MMOL/L — HIGH (ref 96–108)
CO2 SERPL-SCNC: 28 MMOL/L — SIGNIFICANT CHANGE UP (ref 22–31)
CREAT SERPL-MCNC: 1.44 MG/DL — HIGH (ref 0.5–1.3)
GLUCOSE SERPL-MCNC: 130 MG/DL — HIGH (ref 70–99)
POTASSIUM SERPL-MCNC: 3.6 MMOL/L — SIGNIFICANT CHANGE UP (ref 3.5–5.3)
POTASSIUM SERPL-SCNC: 3.6 MMOL/L — SIGNIFICANT CHANGE UP (ref 3.5–5.3)
SODIUM SERPL-SCNC: 144 MMOL/L — SIGNIFICANT CHANGE UP (ref 135–145)

## 2017-07-10 RX ADMIN — CARBIDOPA AND LEVODOPA 1 TABLET(S): 25; 100 TABLET ORAL at 05:05

## 2017-07-10 RX ADMIN — AMLODIPINE BESYLATE 5 MILLIGRAM(S): 2.5 TABLET ORAL at 05:05

## 2017-07-10 RX ADMIN — CLOPIDOGREL BISULFATE 75 MILLIGRAM(S): 75 TABLET, FILM COATED ORAL at 12:03

## 2017-07-10 RX ADMIN — HEPARIN SODIUM 5000 UNIT(S): 5000 INJECTION INTRAVENOUS; SUBCUTANEOUS at 13:12

## 2017-07-10 RX ADMIN — HEPARIN SODIUM 5000 UNIT(S): 5000 INJECTION INTRAVENOUS; SUBCUTANEOUS at 05:05

## 2017-07-10 RX ADMIN — Medication 81 MILLIGRAM(S): at 12:02

## 2017-07-10 RX ADMIN — Medication 100 MILLIGRAM(S): at 05:05

## 2017-07-10 RX ADMIN — Medication 1: at 12:02

## 2017-07-10 RX ADMIN — CARBIDOPA AND LEVODOPA 1 TABLET(S): 25; 100 TABLET ORAL at 13:12

## 2017-07-10 NOTE — DISCHARGE NOTE ADULT - CARE PLAN
Principal Discharge DX:	Chest pain  Goal:	resolved  Instructions for follow-up, activity and diet:	Rehab

## 2017-07-10 NOTE — DISCHARGE NOTE ADULT - MEDICATION SUMMARY - MEDICATIONS TO TAKE
I will START or STAY ON the medications listed below when I get home from the hospital:    aspirin 81 mg oral tablet, chewable  -- 1 tab(s) by mouth once a day  -- Indication: For Heart    Lipitor 10 mg oral tablet  -- 1 tab(s) by mouth once a day  -- Indication: For Heart    Uloric 40 mg oral tablet  -- 1 tab(s) by mouth once a day  -- Indication: For kidney    Sinemet 25 mg-100 mg oral tablet  -- 1 tab(s) by mouth 3 times a day  -- Indication: For PD    Plavix 75 mg oral tablet  -- 1 tab(s) by mouth once a day  -- Indication: For Heart    Toprol- mg oral tablet, extended release  -- 1 tab(s) by mouth once a day  -- Indication: For Heart    Norvasc 10 mg oral tablet  -- 1 tab(s) by mouth once a day  -- Indication: For Heart    Vitamin D3 1000 intl units oral tablet  -- 3 tab(s) by mouth once a day  -- Indication: For .

## 2017-07-10 NOTE — DISCHARGE NOTE ADULT - PATIENT PORTAL LINK FT
“You can access the [facility] Patient Portal, offered by [facility], by registering with the following website: [email or web address]”

## 2017-07-10 NOTE — DISCHARGE NOTE ADULT - HOSPITAL COURSE
88 yo M with PMHx of Parkinson's disease, HTN, CAD, DM, CKD stage 3 and frequent falls presenting with chest pain. The chest pain is retrosternal with a severity of 4/10 and is associated with SOB. He also stated he had a heavy feeling in his legs. This caused him to almost fall but he did not. He denies LOC or head trauma. The pain was associated with pain in the back of his head. BP syst 200 on adm    Of note, patient had an echo in February with an EF of 72%. It also showed defects in the mid and basal aspect of the wall. Stable while here; will go to rehab      PHYSICAL EXAM:    Constitutional: WD, NAD    Neck: No JVD    Respiratory: Lungs clear    Cardiovascular: RRR 2/6 MADIHA    Gastrointestinal: Abd non tender    Extremities: No calf tend.    Vascular: No edema    Neurological: Alert and lucid. No current tremor. Able to move both lower extremities.    Skin: No gen rash    Musculoskeletal: No muscle tend    Psychiatric: calm and flat affect.        1)HTN Urgency; no evid of ischemia  ~ resolved  ~cont. Metoprolol  ~Amlodipine 10 added    2)Parkinsons Disease  ~cont. Sinemet    3)DM2  ~FS qAC/HS  ~cont. ISS per protocol at Rehab

## 2017-07-13 DIAGNOSIS — E11.9 TYPE 2 DIABETES MELLITUS WITHOUT COMPLICATIONS: ICD-10-CM

## 2017-07-13 DIAGNOSIS — N18.3 CHRONIC KIDNEY DISEASE, STAGE 3 (MODERATE): ICD-10-CM

## 2017-07-13 DIAGNOSIS — Z95.5 PRESENCE OF CORONARY ANGIOPLASTY IMPLANT AND GRAFT: ICD-10-CM

## 2017-07-13 DIAGNOSIS — I12.9 HYPERTENSIVE CHRONIC KIDNEY DISEASE WITH STAGE 1 THROUGH STAGE 4 CHRONIC KIDNEY DISEASE, OR UNSPECIFIED CHRONIC KIDNEY DISEASE: ICD-10-CM

## 2017-07-13 DIAGNOSIS — R07.9 CHEST PAIN, UNSPECIFIED: ICD-10-CM

## 2017-07-13 DIAGNOSIS — I25.2 OLD MYOCARDIAL INFARCTION: ICD-10-CM

## 2017-07-13 DIAGNOSIS — Z79.899 OTHER LONG TERM (CURRENT) DRUG THERAPY: ICD-10-CM

## 2017-07-13 DIAGNOSIS — E78.5 HYPERLIPIDEMIA, UNSPECIFIED: ICD-10-CM

## 2017-07-13 DIAGNOSIS — Z95.0 PRESENCE OF CARDIAC PACEMAKER: ICD-10-CM

## 2017-07-13 DIAGNOSIS — G20 PARKINSON'S DISEASE: ICD-10-CM

## 2017-07-13 DIAGNOSIS — I25.10 ATHEROSCLEROTIC HEART DISEASE OF NATIVE CORONARY ARTERY WITHOUT ANGINA PECTORIS: ICD-10-CM

## 2017-07-13 DIAGNOSIS — E06.3 AUTOIMMUNE THYROIDITIS: ICD-10-CM

## 2017-07-13 DIAGNOSIS — Z79.82 LONG TERM (CURRENT) USE OF ASPIRIN: ICD-10-CM

## 2017-07-13 DIAGNOSIS — R29.6 REPEATED FALLS: ICD-10-CM

## 2017-07-13 DIAGNOSIS — H35.30 UNSPECIFIED MACULAR DEGENERATION: ICD-10-CM

## 2017-07-13 DIAGNOSIS — I05.2 RHEUMATIC MITRAL STENOSIS WITH INSUFFICIENCY: ICD-10-CM

## 2017-07-13 DIAGNOSIS — I16.0 HYPERTENSIVE URGENCY: ICD-10-CM

## 2017-07-14 DIAGNOSIS — E11.22 TYPE 2 DIABETES MELLITUS WITH DIABETIC CHRONIC KIDNEY DISEASE: ICD-10-CM

## 2017-08-13 ENCOUNTER — INPATIENT (INPATIENT)
Facility: HOSPITAL | Age: 82
LOS: 4 days | Discharge: HOME HEALTH | End: 2017-08-18
Attending: INTERNAL MEDICINE | Admitting: FAMILY MEDICINE
Payer: COMMERCIAL

## 2017-08-13 VITALS
OXYGEN SATURATION: 96 % | HEIGHT: 68 IN | DIASTOLIC BLOOD PRESSURE: 68 MMHG | HEART RATE: 73 BPM | WEIGHT: 171.96 LBS | SYSTOLIC BLOOD PRESSURE: 142 MMHG | TEMPERATURE: 98 F | RESPIRATION RATE: 19 BRPM

## 2017-08-13 DIAGNOSIS — I10 ESSENTIAL (PRIMARY) HYPERTENSION: ICD-10-CM

## 2017-08-13 DIAGNOSIS — E11.9 TYPE 2 DIABETES MELLITUS WITHOUT COMPLICATIONS: ICD-10-CM

## 2017-08-13 DIAGNOSIS — G20 PARKINSON'S DISEASE: ICD-10-CM

## 2017-08-13 DIAGNOSIS — N17.9 ACUTE KIDNEY FAILURE, UNSPECIFIED: ICD-10-CM

## 2017-08-13 DIAGNOSIS — J18.9 PNEUMONIA, UNSPECIFIED ORGANISM: ICD-10-CM

## 2017-08-13 DIAGNOSIS — N18.3 CHRONIC KIDNEY DISEASE, STAGE 3 (MODERATE): ICD-10-CM

## 2017-08-13 DIAGNOSIS — E44.1 MILD PROTEIN-CALORIE MALNUTRITION: ICD-10-CM

## 2017-08-13 PROCEDURE — 99285 EMERGENCY DEPT VISIT HI MDM: CPT

## 2017-08-13 PROCEDURE — 93010 ELECTROCARDIOGRAM REPORT: CPT

## 2017-08-13 PROCEDURE — 71010: CPT | Mod: 26

## 2017-08-13 RX ORDER — ATORVASTATIN CALCIUM 80 MG/1
1 TABLET, FILM COATED ORAL
Qty: 0 | Refills: 0 | COMMUNITY

## 2017-08-13 RX ORDER — CARBIDOPA AND LEVODOPA 25; 100 MG/1; MG/1
1 TABLET ORAL THREE TIMES A DAY
Qty: 0 | Refills: 0 | Status: DISCONTINUED | OUTPATIENT
Start: 2017-08-13 | End: 2017-08-18

## 2017-08-13 RX ORDER — ALBUTEROL 90 UG/1
2.5 AEROSOL, METERED ORAL EVERY 6 HOURS
Qty: 0 | Refills: 0 | Status: DISCONTINUED | OUTPATIENT
Start: 2017-08-13 | End: 2017-08-13

## 2017-08-13 RX ORDER — FEBUXOSTAT 40 MG/1
40 TABLET ORAL DAILY
Qty: 0 | Refills: 0 | Status: DISCONTINUED | OUTPATIENT
Start: 2017-08-13 | End: 2017-08-18

## 2017-08-13 RX ORDER — PIPERACILLIN AND TAZOBACTAM 4; .5 G/20ML; G/20ML
3.38 INJECTION, POWDER, LYOPHILIZED, FOR SOLUTION INTRAVENOUS EVERY 8 HOURS
Qty: 0 | Refills: 0 | Status: COMPLETED | OUTPATIENT
Start: 2017-08-13 | End: 2017-08-16

## 2017-08-13 RX ORDER — HEPARIN SODIUM 5000 [USP'U]/ML
5000 INJECTION INTRAVENOUS; SUBCUTANEOUS EVERY 12 HOURS
Qty: 0 | Refills: 0 | Status: DISCONTINUED | OUTPATIENT
Start: 2017-08-13 | End: 2017-08-18

## 2017-08-13 RX ORDER — AMLODIPINE BESYLATE 2.5 MG/1
1 TABLET ORAL
Qty: 0 | Refills: 0 | COMMUNITY

## 2017-08-13 RX ORDER — ACETAMINOPHEN 500 MG
650 TABLET ORAL EVERY 6 HOURS
Qty: 0 | Refills: 0 | Status: DISCONTINUED | OUTPATIENT
Start: 2017-08-13 | End: 2017-08-18

## 2017-08-13 RX ORDER — CHOLECALCIFEROL (VITAMIN D3) 125 MCG
3 CAPSULE ORAL
Qty: 0 | Refills: 0 | COMMUNITY

## 2017-08-13 RX ORDER — CLOPIDOGREL BISULFATE 75 MG/1
75 TABLET, FILM COATED ORAL DAILY
Qty: 0 | Refills: 0 | Status: DISCONTINUED | OUTPATIENT
Start: 2017-08-13 | End: 2017-08-18

## 2017-08-13 RX ORDER — CARBIDOPA AND LEVODOPA 25; 100 MG/1; MG/1
1 TABLET ORAL ONCE
Qty: 0 | Refills: 0 | Status: COMPLETED | OUTPATIENT
Start: 2017-08-13 | End: 2017-08-13

## 2017-08-13 RX ORDER — ATORVASTATIN CALCIUM 80 MG/1
20 TABLET, FILM COATED ORAL AT BEDTIME
Qty: 0 | Refills: 0 | Status: DISCONTINUED | OUTPATIENT
Start: 2017-08-13 | End: 2017-08-18

## 2017-08-13 RX ORDER — ASPIRIN/CALCIUM CARB/MAGNESIUM 324 MG
81 TABLET ORAL DAILY
Qty: 0 | Refills: 0 | Status: DISCONTINUED | OUTPATIENT
Start: 2017-08-13 | End: 2017-08-18

## 2017-08-13 RX ORDER — METOPROLOL TARTRATE 50 MG
100 TABLET ORAL DAILY
Qty: 0 | Refills: 0 | Status: DISCONTINUED | OUTPATIENT
Start: 2017-08-13 | End: 2017-08-18

## 2017-08-13 RX ORDER — CEFTRIAXONE 500 MG/1
1 INJECTION, POWDER, FOR SOLUTION INTRAMUSCULAR; INTRAVENOUS ONCE
Qty: 0 | Refills: 0 | Status: COMPLETED | OUTPATIENT
Start: 2017-08-13 | End: 2017-08-13

## 2017-08-13 RX ORDER — AMLODIPINE BESYLATE 2.5 MG/1
5 TABLET ORAL DAILY
Qty: 0 | Refills: 0 | Status: DISCONTINUED | OUTPATIENT
Start: 2017-08-13 | End: 2017-08-18

## 2017-08-13 RX ORDER — ALBUTEROL 90 UG/1
2.5 AEROSOL, METERED ORAL EVERY 6 HOURS
Qty: 0 | Refills: 0 | Status: DISCONTINUED | OUTPATIENT
Start: 2017-08-13 | End: 2017-08-18

## 2017-08-13 RX ORDER — AZITHROMYCIN 500 MG/1
400 TABLET, FILM COATED ORAL ONCE
Qty: 0 | Refills: 0 | Status: COMPLETED | OUTPATIENT
Start: 2017-08-13 | End: 2017-08-13

## 2017-08-13 RX ORDER — ASPIRIN/CALCIUM CARB/MAGNESIUM 324 MG
81 TABLET ORAL DAILY
Qty: 0 | Refills: 0 | Status: DISCONTINUED | OUTPATIENT
Start: 2017-08-13 | End: 2017-08-13

## 2017-08-13 RX ADMIN — AZITHROMYCIN 254 MILLIGRAM(S): 500 TABLET, FILM COATED ORAL at 17:58

## 2017-08-13 RX ADMIN — ATORVASTATIN CALCIUM 20 MILLIGRAM(S): 80 TABLET, FILM COATED ORAL at 22:40

## 2017-08-13 RX ADMIN — HEPARIN SODIUM 5000 UNIT(S): 5000 INJECTION INTRAVENOUS; SUBCUTANEOUS at 22:40

## 2017-08-13 RX ADMIN — CEFTRIAXONE 100 GRAM(S): 500 INJECTION, POWDER, FOR SOLUTION INTRAMUSCULAR; INTRAVENOUS at 17:29

## 2017-08-13 RX ADMIN — PIPERACILLIN AND TAZOBACTAM 25 GRAM(S): 4; .5 INJECTION, POWDER, LYOPHILIZED, FOR SOLUTION INTRAVENOUS at 22:40

## 2017-08-13 RX ADMIN — Medication 100 MILLIGRAM(S): at 23:41

## 2017-08-13 RX ADMIN — CARBIDOPA AND LEVODOPA 1 TABLET(S): 25; 100 TABLET ORAL at 17:58

## 2017-08-13 NOTE — ED PROVIDER NOTE - CARDIAC, MLM
Normal rate, regular rhythm.  Heart sounds S1, S2.  No murmurs, rubs or gallops. 2+ pulses bilat rad and DP. 2+ bilat LE pitting edema. no jvd.

## 2017-08-13 NOTE — ED ADULT NURSE NOTE - OBJECTIVE STATEMENT
loose cough x2 weeks, nonproductive. pt has wife with same symptoms. denies fever but wife has fever at home. pt complains of generalized weakness, hx of parkinsons. pt denies NVD, complains of chest pain upon coughing, not at rest.

## 2017-08-13 NOTE — H&P ADULT - NSHPPHYSICALEXAM_GEN_ALL_CORE
T(C): 36.4 (08-13-17 @ 20:32), Max: 37.8 (08-13-17 @ 14:48)  HR: 67 (08-13-17 @ 20:32) (67 - 78)  BP: 153/61 (08-13-17 @ 20:32) (136/85 - 153/61)  RR: 18 (08-13-17 @ 20:32) (15 - 19)  SpO2: 98% (08-13-17 @ 20:32) (95% - 98%)

## 2017-08-13 NOTE — H&P ADULT - PROBLEM SELECTOR PLAN 1
cbc wnl, follow am value  CXR does not demonstrate consolidation but will treat for HCAP in setting of + chest findings, fevers, and recent d/c from [facility]  albuterol nebs q6hour, chest PT  Vancomycin 1GM given in ED  continuing Zosyn 3/375 mg IVPB Q 12 hours   following blood culture, urine culture  tylenol for fever >100.4F  ID consult - Dr. Irving

## 2017-08-13 NOTE — H&P ADULT - NSHPSOCIALHISTORY_GEN_ALL_CORE
Past smoking history, patient reports quitting 40 years ago 15 pack year history  denies ETOH, illicit drugs  Lives at home with wife

## 2017-08-13 NOTE — H&P ADULT - HISTORY OF PRESENT ILLNESS
88 y/o male with PMHx of HTN, gout, HLd, CAD, CKD stage III, Parkinson's, and recent admission to  1 months about for chest pain that present from home (after being at [facility]) with complaints of worsening dry cough, generalized fatigue, malaise and chills.  Patient states that these symptoms have worsened over the last 2-3 days.  He states that this cough is associated with chest tightness and sore throat.  He has history of + sick contact as his wife has similar symptoms.  Patient denies chills, nausea, vomiting, headache, or other symptom    ED Course - patient is hypoxic to 88% on RA  EKG unchanged from previouse

## 2017-08-13 NOTE — PATIENT PROFILE ADULT. - PMH
Diabetes  diet controlled  Falls  last fall in January  Hypertension    Macular degeneration    Parkinson disease    Renal failure

## 2017-08-13 NOTE — ED PROVIDER NOTE - OBJECTIVE STATEMENT
89 year old male with history of Parkinson disease, Hypertension, CAD s/p 2 stents, s/p PPM, for "arrhythmia," DM, CKD stage 3, presents with cough x 2 weeks. cough non-productive. +SOB. +generalized fatigue. +chills, no fever. no n/v/d. no abd pain. also with "pain across chest" only with cough. 89 year old male with history of Parkinson disease, Hypertension, CAD s/p 2 stents, s/p PPM, for "arrhythmia," DM, CKD stage 3, presents with cough x 2 weeks. cough non-productive. +SOB. +generalized fatigue. +chills, no fever. no n/v/d. no abd pain. also with "pain across chest," though only with cough.

## 2017-08-13 NOTE — ED PROVIDER NOTE - MEDICAL DECISION MAKING DETAILS
89M with history of Parkinson's disease, hypertension, CAD sp PPM, presents with cough x a couple weeks, worsening over past few days, with chills and gen fatigue. Wife is also sick, presented to ED with fever and cough. +Rales in L lower lung fields on exam. O2 sat mid 90's on RA, with rectal temp 37.8. concerning for pneumonia vs bronchitis. will check labs including cultures, cxr, ua and culture, ekg. low suspicion cardiac etiology, but with chest pain with cough, ekg with no clear ischemic changes, will check troponin. low suspicion pneumothorax or other intrathoracic pathology. will plan to antibiose for pneumonia, patient will likely require admission. - Pankaj Fowler MD

## 2017-08-14 LAB
ANION GAP SERPL CALC-SCNC: 10 MMOL/L — SIGNIFICANT CHANGE UP (ref 5–17)
BASOPHILS # BLD AUTO: 0.1 K/UL — SIGNIFICANT CHANGE UP (ref 0–0.2)
BASOPHILS NFR BLD AUTO: 1.3 % — SIGNIFICANT CHANGE UP (ref 0–2)
BUN SERPL-MCNC: 21 MG/DL — SIGNIFICANT CHANGE UP (ref 7–23)
CALCIUM SERPL-MCNC: 9 MG/DL — SIGNIFICANT CHANGE UP (ref 8.5–10.1)
CHLORIDE SERPL-SCNC: 106 MMOL/L — SIGNIFICANT CHANGE UP (ref 96–108)
CO2 SERPL-SCNC: 23 MMOL/L — SIGNIFICANT CHANGE UP (ref 22–31)
CREAT SERPL-MCNC: 1.45 MG/DL — HIGH (ref 0.5–1.3)
EOSINOPHIL # BLD AUTO: 0.2 K/UL — SIGNIFICANT CHANGE UP (ref 0–0.5)
EOSINOPHIL NFR BLD AUTO: 2.8 % — SIGNIFICANT CHANGE UP (ref 0–6)
GLUCOSE SERPL-MCNC: 121 MG/DL — HIGH (ref 70–99)
HCT VFR BLD CALC: 30.5 % — LOW (ref 39–50)
HGB BLD-MCNC: 10.2 G/DL — LOW (ref 13–17)
LYMPHOCYTES # BLD AUTO: 0.9 K/UL — LOW (ref 1–3.3)
LYMPHOCYTES # BLD AUTO: 12.1 % — LOW (ref 13–44)
MCHC RBC-ENTMCNC: 28.3 PG — SIGNIFICANT CHANGE UP (ref 27–34)
MCHC RBC-ENTMCNC: 33.3 GM/DL — SIGNIFICANT CHANGE UP (ref 32–36)
MCV RBC AUTO: 85 FL — SIGNIFICANT CHANGE UP (ref 80–100)
MONOCYTES # BLD AUTO: 1 K/UL — HIGH (ref 0–0.9)
MONOCYTES NFR BLD AUTO: 13.4 % — SIGNIFICANT CHANGE UP (ref 2–14)
NEUTROPHILS # BLD AUTO: 5.2 K/UL — SIGNIFICANT CHANGE UP (ref 1.8–7.4)
NEUTROPHILS NFR BLD AUTO: 70.4 % — SIGNIFICANT CHANGE UP (ref 43–77)
PLATELET # BLD AUTO: 163 K/UL — SIGNIFICANT CHANGE UP (ref 150–400)
POTASSIUM SERPL-MCNC: 3.7 MMOL/L — SIGNIFICANT CHANGE UP (ref 3.5–5.3)
POTASSIUM SERPL-SCNC: 3.7 MMOL/L — SIGNIFICANT CHANGE UP (ref 3.5–5.3)
PREALB SERPL-MCNC: 10 MG/DL — LOW (ref 18–45)
RBC # BLD: 3.59 M/UL — LOW (ref 4.2–5.8)
RBC # FLD: 12.7 % — SIGNIFICANT CHANGE UP (ref 10.3–14.5)
SODIUM SERPL-SCNC: 139 MMOL/L — SIGNIFICANT CHANGE UP (ref 135–145)
WBC # BLD: 7.3 K/UL — SIGNIFICANT CHANGE UP (ref 3.8–10.5)
WBC # FLD AUTO: 7.3 K/UL — SIGNIFICANT CHANGE UP (ref 3.8–10.5)

## 2017-08-14 RX ORDER — VANCOMYCIN HCL 1 G
500 VIAL (EA) INTRAVENOUS EVERY 12 HOURS
Qty: 0 | Refills: 0 | Status: COMPLETED | OUTPATIENT
Start: 2017-08-14 | End: 2017-08-16

## 2017-08-14 RX ADMIN — PIPERACILLIN AND TAZOBACTAM 25 GRAM(S): 4; .5 INJECTION, POWDER, LYOPHILIZED, FOR SOLUTION INTRAVENOUS at 06:07

## 2017-08-14 RX ADMIN — AMLODIPINE BESYLATE 5 MILLIGRAM(S): 2.5 TABLET ORAL at 06:07

## 2017-08-14 RX ADMIN — Medication 100 MILLIGRAM(S): at 06:07

## 2017-08-14 RX ADMIN — ALBUTEROL 2.5 MILLIGRAM(S): 90 AEROSOL, METERED ORAL at 19:41

## 2017-08-14 RX ADMIN — HEPARIN SODIUM 5000 UNIT(S): 5000 INJECTION INTRAVENOUS; SUBCUTANEOUS at 06:07

## 2017-08-14 RX ADMIN — ALBUTEROL 2.5 MILLIGRAM(S): 90 AEROSOL, METERED ORAL at 00:08

## 2017-08-14 RX ADMIN — CLOPIDOGREL BISULFATE 75 MILLIGRAM(S): 75 TABLET, FILM COATED ORAL at 12:01

## 2017-08-14 RX ADMIN — CARBIDOPA AND LEVODOPA 1 TABLET(S): 25; 100 TABLET ORAL at 14:56

## 2017-08-14 RX ADMIN — FEBUXOSTAT 40 MILLIGRAM(S): 40 TABLET ORAL at 12:01

## 2017-08-14 RX ADMIN — PIPERACILLIN AND TAZOBACTAM 25 GRAM(S): 4; .5 INJECTION, POWDER, LYOPHILIZED, FOR SOLUTION INTRAVENOUS at 14:56

## 2017-08-14 RX ADMIN — CARBIDOPA AND LEVODOPA 1 TABLET(S): 25; 100 TABLET ORAL at 06:07

## 2017-08-14 RX ADMIN — HEPARIN SODIUM 5000 UNIT(S): 5000 INJECTION INTRAVENOUS; SUBCUTANEOUS at 18:23

## 2017-08-14 RX ADMIN — PIPERACILLIN AND TAZOBACTAM 25 GRAM(S): 4; .5 INJECTION, POWDER, LYOPHILIZED, FOR SOLUTION INTRAVENOUS at 21:37

## 2017-08-14 RX ADMIN — Medication 100 MILLIGRAM(S): at 06:08

## 2017-08-14 RX ADMIN — Medication 100 MILLIGRAM(S): at 18:58

## 2017-08-14 RX ADMIN — ALBUTEROL 2.5 MILLIGRAM(S): 90 AEROSOL, METERED ORAL at 11:05

## 2017-08-14 RX ADMIN — Medication 100 MILLIGRAM(S): at 12:02

## 2017-08-14 RX ADMIN — ATORVASTATIN CALCIUM 20 MILLIGRAM(S): 80 TABLET, FILM COATED ORAL at 21:36

## 2017-08-14 RX ADMIN — Medication 81 MILLIGRAM(S): at 12:01

## 2017-08-14 RX ADMIN — CARBIDOPA AND LEVODOPA 1 TABLET(S): 25; 100 TABLET ORAL at 21:37

## 2017-08-14 NOTE — PHYSICAL THERAPY INITIAL EVALUATION ADULT - ADDITIONAL COMMENTS
Pt. resides in one level house w/ 2-3 MARTINA and 1 flight of steps to basement for laundry. Pt. ambulates independently w/ RW at baseline and requires assistance for bed transfers. Pt. has transport chair, hospital bed w/ trapeze bar, tub bench and grab bars in bathroom. Pt. does not wear O2 at home.

## 2017-08-14 NOTE — PHYSICAL THERAPY INITIAL EVALUATION ADULT - PERTINENT HX OF CURRENT PROBLEM, REHAB EVAL
Pt. presents from home w/ c/o worsening dry cough, fatigue and chills. Pt. was recently d/c home from [facility] w/ home PT to begin, however pt. was admitted to .

## 2017-08-14 NOTE — PROGRESS NOTE ADULT - ASSESSMENT
· Assessment		  88 y/o male with PMHx of HTN, gout, HLd, CAD, CKD stage III, Parkinson's, and recent admission to  1 months about for chest pain that present from home (after being at [facility]) with complaints of worsening dry cough, generalized fatigue, malaise and chills.    social work and case management inputs requested at patient has fears of returning home alone will need assistance  PT consult     Problem/Plan - 1:  ·  Problem: Pneumonia due to infectious organism, unspecified laterality, unspecified part of lung.  Plan: cbc wnl, follow am value  CXR does not demonstrate consolidation but will treat for HCAP in setting of + chest findings, fevers, and recent d/c from [facility]  albuterol nebs q6hour, chest PT  Vancomycin 500 mg Q 12 hours to follow trough pre 4th dose per ID  continuing Zosyn 3/375 mg IVPB Q 8 hours   following blood culture, urine culture  tylenol for fever >100.4F  ID consult appreciated      Problem/Plan - 2:  ·  Problem: GARRETT (acute kidney injury).  Plan: likely secondary to dehydration  allow for oral hydration  follow am bmp slightly improved follow am bmp    Problem/Plan - 3:  ·  Problem: Stage 3 chronic kidney disease.  Plan: as above  renal dosing of abx.     Problem/Plan - 4:  ·  Problem: Type 2 diabetes mellitus without complication, without long-term current use of insulin.  Plan: diet controlled.     Problem/Plan - 5:  ·  Problem: Essential hypertension.  Plan: continuing to follow bp and resuming home medications.     Problem/Plan - 6:  Problem: Parkinson disease. Plan: continuing sinemet.    Problem/Plan - 7:  ·  Problem: Mild protein-calorie malnutrition.  Plan: prealbumin in am  nutrition assessment.

## 2017-08-14 NOTE — PROGRESS NOTE ADULT - SUBJECTIVE AND OBJECTIVE BOX
Patient is a 89y old  Male who presents with a chief complaint of I have a cough and fever (13 Aug 2017 21:55)        HPI:  89 y/o male with PMHx of HTN, gout, HLd, CAD, CKD stage III, Parkinson's, and recent admission to  1 months about for chest pain that present from home (after being at [facility]) with complaints of worsening dry cough, generalized fatigue, malaise and chills.  Patient states that these symptoms have worsened over the last 2-3 days.  He states that this cough is associated with chest tightness and sore throat.  He has history of + sick contact as his wife has similar symptoms.  Patient denies chills, nausea, vomiting, headache, or other symptom    ED Course - patient is hypoxic to 88% on RA  EKG unchanged from previouse (13 Aug 2017 21:55)      SUBJECTIVE & OBJECTIVE:    Pt seen and examined at bedside this afternoon.  He appears well though still requiring nasal 02 and being SOB while ambulating.  No acute events overnight, no fevers, no chills.      PHYSICAL EXAM:  T(C): 37.2 (17 @ 11:00), Max: 37.7 (17 @ 17:54)  HR: 72 (17 @ 11:07) (67 - 73)  BP: 110/51 (17 @ 11:00) (110/51 - 153/61)  RR: 20 (17 @ 11:00) (15 - 20)  SpO2: 94% (17 @ 11:00) (93% - 98%)  Wt(kg): -- Height (cm): 162.56 ( @ 20:32)  Weight (kg): 79.4 ( @ 20:32)  BMI (kg/m2): 30 ( @ 20:32)  BSA (m2): 1.85 ( @ 20:32)  GENERAL: NAD, well-groomed, well-developed  HEAD:  Atraumatic, Normocephalic  EYES: EOMI, PERRLA, conjunctiva and sclera clear  ENMT: Moist mucous membranes  NECK: Supple, No JVD  NERVOUS SYSTEM:  Alert & Oriented X3, Motor Strength 5/5 B/L upper and lower extremities; DTRs 2+ intact and symmetric  CHEST/LUNG: poor air entry to bases., crackles over left lung field   HEART: Regular rate and rhythm; No murmurs, rubs, or gallops  ABDOMEN: Soft, Nontender, Nondistended; Bowel sounds present  EXTREMITIES:  2+ Peripheral Pulses, No clubbing, cyanosis, +1 edema bilaterally        MEDICATIONS  (STANDING):  piperacillin/tazobactam IVPB. 3.375 Gram(s) IV Intermittent every 8 hours  atorvastatin 20 milliGRAM(s) Oral at bedtime  amLODIPine   Tablet 5 milliGRAM(s) Oral daily  clopidogrel Tablet 75 milliGRAM(s) Oral daily  carbidopa/levodopa  25/100 1 Tablet(s) Oral three times a day  metoprolol succinate  milliGRAM(s) Oral daily  ALBUTerol    0.083% 2.5 milliGRAM(s) Nebulizer every 6 hours  febuxostat 40 milliGRAM(s) Oral daily  aspirin  chewable 81 milliGRAM(s) Oral daily  heparin  Injectable 5000 Unit(s) SubCutaneous every 12 hours  vancomycin  IVPB 500 milliGRAM(s) IV Intermittent every 12 hours    MEDICATIONS  (PRN):  acetaminophen   Tablet 650 milliGRAM(s) Oral every 6 hours PRN For Temp greater than 38 C (100.4 F)      LABS:                        10.2   7.3   )-----------( 163      ( 14 Aug 2017 05:38 )             30.5     08-14    139  |  106  |  21  ----------------------------<  121<H>  3.7   |  23  |  1.45<H>    Ca    9.0      14 Aug 2017 05:38    TPro  7.0  /  Alb  3.1<L>  /  TBili  0.5  /  DBili  x   /  AST  82<H>  /  ALT  42  /  AlkPhos  136<H>  08-13    PT/INR - ( 13 Aug 2017 14:49 )   PT: 12.7 sec;   INR: 1.17 ratio         PTT - ( 13 Aug 2017 14:49 )  PTT:33.4 sec  Urinalysis Basic - ( 13 Aug 2017 15:52 )    Color: Yellow / Appearance: Clear / S.015 / pH: x  Gluc: x / Ketone: Negative  / Bili: Negative / Urobili: Negative mg/dL   Blood: x / Protein: 30 mg/dL / Nitrite: Negative   Leuk Esterase: Negative / RBC: 3-5 /HPF / WBC 0-2   Sq Epi: x / Non Sq Epi: x / Bacteria: Few      CARDIAC MARKERS ( 13 Aug 2017 14:49 )  <0.015 ng/mL / x     / x     / x     / x        RECENT CULTURES:  pending urine and blood cultures    RADIOLOGY & ADDITIONAL TESTS:    DVT/GI ppx  Discussed with pt @ bedside

## 2017-08-14 NOTE — CONSULT NOTE ADULT - SUBJECTIVE AND OBJECTIVE BOX
HPI:  89 y/o male with PMHx of HTN, gout, HLd, CAD, CKD stage III, Parkinson's, and recent admission to  1 month ago now admitted on  for evaluation of 2-3 days cough, shortness of breath, similar to complaints that his sick wife has; notes she was admitted with pneumonia as well. Upon admission the patient was hypoxic with oxygen saturation to 88% on room air.          PMH: as above  PSH: as above  Meds: per reconcilation sheet, noted below  MEDICATIONS  (STANDING):  piperacillin/tazobactam IVPB. 3.375 Gram(s) IV Intermittent every 8 hours  atorvastatin 20 milliGRAM(s) Oral at bedtime  amLODIPine   Tablet 5 milliGRAM(s) Oral daily  clopidogrel Tablet 75 milliGRAM(s) Oral daily  carbidopa/levodopa  25/100 1 Tablet(s) Oral three times a day  metoprolol succinate  milliGRAM(s) Oral daily  ALBUTerol    0.083% 2.5 milliGRAM(s) Nebulizer every 6 hours  febuxostat 40 milliGRAM(s) Oral daily  aspirin  chewable 81 milliGRAM(s) Oral daily  heparin  Injectable 5000 Unit(s) SubCutaneous every 12 hours  vancomycin  IVPB 500 milliGRAM(s) IV Intermittent every 12 hours    MEDICATIONS  (PRN):  acetaminophen   Tablet 650 milliGRAM(s) Oral every 6 hours PRN For Temp greater than 38 C (100.4 F)    Allergies    No Known Drug Allergies  Originally Entered as [Hives] reaction to [Insect Extracts] (Unknown)    Intolerances      Social: no smoking, no alcohol, no illegal drugs; no recent travel, no exposure to TB  FAMILY HISTORY:  No pertinent family history in first degree relatives    ROS: the patient has no fever, no chills, no HA, no dizziness, no sore throat, no blurry vision, no CP, no palpitations, no abdominal pain, no diarrhea, no N/V, no dysuria, no leg pain, no claudication, no rash, no joint aches, no rectal pain or bleeding, no night sweats  Vital Signs Last 24 Hrs  T(C): 37.2 (14 Aug 2017 11:00), Max: 37.8 (13 Aug 2017 14:48)  T(F): 98.9 (14 Aug 2017 11:00), Max: 100.1 (13 Aug 2017 14:48)  HR: 72 (14 Aug 2017 11:07) (67 - 78)  BP: 110/51 (14 Aug 2017 11:00) (110/51 - 153/61)  BP(mean): --  RR: 20 (14 Aug 2017 11:00) (15 - 20)  SpO2: 94% (14 Aug 2017 11:00) (93% - 98%)  Daily Height in cm: 162.56 (13 Aug 2017 20:32)    Daily   Constitutional: frail looking  HEENT: NC/AT, EOMI, PERRLA  Neck: supple  Respiratory: decreased breath sounds at bases, scattered coarse breath sounds  Cardiovascular: S1S2 regular, no murmurs  Abdomen: soft, not tender, not distended, positive BS  Genitourinary: deferred  Rectal: deferred  Musculoskeletal: no muscle tenderness, no joint swelling or tenderness  Neurological: AxOx3, moving all extremities, no focal deficits  Skin: no rashes                          10.2   7.3   )-----------( 163      ( 14 Aug 2017 05:38 )             30.5         139  |  106  |  21  ----------------------------<  121<H>  3.7   |  23  |  1.45<H>    Ca    9.0      14 Aug 2017 05:38    TPro  7.0  /  Alb  3.1<L>  /  TBili  0.5  /  DBili  x   /  AST  82<H>  /  ALT  42  /  AlkPhos  136<H>  08-13     LIVER FUNCTIONS - ( 13 Aug 2017 14:49 )  Alb: 3.1 g/dL / Pro: 7.0 gm/dL / ALK PHOS: 136 U/L / ALT: 42 U/L / AST: 82 U/L / GGT: x           Urinalysis Basic - ( 13 Aug 2017 15:52 )    Color: Yellow / Appearance: Clear / S.015 / pH: x  Gluc: x / Ketone: Negative  / Bili: Negative / Urobili: Negative mg/dL   Blood: x / Protein: 30 mg/dL / Nitrite: Negative   Leuk Esterase: Negative / RBC: 3-5 /HPF / WBC 0-2   Sq Epi: x / Non Sq Epi: x / Bacteria: Few          Radiology:< from: Xray Chest 1 View AP/PA. (17 @ 16:33) >  EXAM:  CHEST SINGLE VIEW FRONTAL                            PROCEDURE DATE:  2017          INTERPRETATION:  Exam Date: 2017 4:33 PM    History: Cough    Technique: Single frontal portable view of the chest with comparison to    2017    Findings:    Left-sided pacemaker. Heart is mildly enlarged. The lungs are grossly   clear. The apices and hemidiaphragms are unremarkable. Degenerative   changes of the visualized osseous structures.        Impression:    No acute disease    No significant interval change as compared to  2017    < end of copied text >      Advanced directive addressed: full resuscitation

## 2017-08-14 NOTE — CONSULT NOTE ADULT - ASSESSMENT
88 y/o male with PMHx of HTN, gout, HLd, CAD, CKD stage III, Parkinson's, and recent admission to  1 month ago now admitted on 8/13 for evaluation of 2-3 days cough, shortness of breath, similar to complaints that his sick wife has; notes she was admitted with pneumonia as well. Upon admission the patient was hypoxic with oxygen saturation to 88% on room air.  1. Patient admitted with respiratory complaints, possible early pneumonia, though chest xray is clear  - would consider to treat as health care associated pneumonia  - patient at risk for gram negative rods and other resistant bacteria   - oxygen and nebs as needed   - iv hydration and supportive care   - agree with zosyn as ordered  - will add vancomycin to treat resistant bacteria   - check vancomycin trough prior to fourth dose   2. other issues:  HTN, gout, HLd, CAD, CKD stage III, Parkinson's  - per medicine

## 2017-08-15 LAB
ANION GAP SERPL CALC-SCNC: 8 MMOL/L — SIGNIFICANT CHANGE UP (ref 5–17)
BUN SERPL-MCNC: 27 MG/DL — HIGH (ref 7–23)
CALCIUM SERPL-MCNC: 8.8 MG/DL — SIGNIFICANT CHANGE UP (ref 8.5–10.1)
CHLORIDE SERPL-SCNC: 106 MMOL/L — SIGNIFICANT CHANGE UP (ref 96–108)
CO2 SERPL-SCNC: 25 MMOL/L — SIGNIFICANT CHANGE UP (ref 22–31)
CREAT SERPL-MCNC: 1.65 MG/DL — HIGH (ref 0.5–1.3)
GLUCOSE SERPL-MCNC: 158 MG/DL — HIGH (ref 70–99)
HCT VFR BLD CALC: 29.4 % — LOW (ref 39–50)
HGB BLD-MCNC: 9.8 G/DL — LOW (ref 13–17)
MAGNESIUM SERPL-MCNC: 2 MG/DL — SIGNIFICANT CHANGE UP (ref 1.6–2.6)
MCHC RBC-ENTMCNC: 28.6 PG — SIGNIFICANT CHANGE UP (ref 27–34)
MCHC RBC-ENTMCNC: 33.3 GM/DL — SIGNIFICANT CHANGE UP (ref 32–36)
MCV RBC AUTO: 86 FL — SIGNIFICANT CHANGE UP (ref 80–100)
PLATELET # BLD AUTO: 185 K/UL — SIGNIFICANT CHANGE UP (ref 150–400)
POTASSIUM SERPL-MCNC: 3.6 MMOL/L — SIGNIFICANT CHANGE UP (ref 3.5–5.3)
POTASSIUM SERPL-SCNC: 3.6 MMOL/L — SIGNIFICANT CHANGE UP (ref 3.5–5.3)
RBC # BLD: 3.43 M/UL — LOW (ref 4.2–5.8)
RBC # FLD: 12.8 % — SIGNIFICANT CHANGE UP (ref 10.3–14.5)
SODIUM SERPL-SCNC: 139 MMOL/L — SIGNIFICANT CHANGE UP (ref 135–145)
WBC # BLD: 7.3 K/UL — SIGNIFICANT CHANGE UP (ref 3.8–10.5)
WBC # FLD AUTO: 7.3 K/UL — SIGNIFICANT CHANGE UP (ref 3.8–10.5)

## 2017-08-15 RX ADMIN — PIPERACILLIN AND TAZOBACTAM 25 GRAM(S): 4; .5 INJECTION, POWDER, LYOPHILIZED, FOR SOLUTION INTRAVENOUS at 22:37

## 2017-08-15 RX ADMIN — ALBUTEROL 2.5 MILLIGRAM(S): 90 AEROSOL, METERED ORAL at 20:09

## 2017-08-15 RX ADMIN — AMLODIPINE BESYLATE 5 MILLIGRAM(S): 2.5 TABLET ORAL at 06:35

## 2017-08-15 RX ADMIN — ALBUTEROL 2.5 MILLIGRAM(S): 90 AEROSOL, METERED ORAL at 02:36

## 2017-08-15 RX ADMIN — ATORVASTATIN CALCIUM 20 MILLIGRAM(S): 80 TABLET, FILM COATED ORAL at 22:37

## 2017-08-15 RX ADMIN — PIPERACILLIN AND TAZOBACTAM 25 GRAM(S): 4; .5 INJECTION, POWDER, LYOPHILIZED, FOR SOLUTION INTRAVENOUS at 06:33

## 2017-08-15 RX ADMIN — FEBUXOSTAT 40 MILLIGRAM(S): 40 TABLET ORAL at 13:58

## 2017-08-15 RX ADMIN — CARBIDOPA AND LEVODOPA 1 TABLET(S): 25; 100 TABLET ORAL at 22:38

## 2017-08-15 RX ADMIN — PIPERACILLIN AND TAZOBACTAM 25 GRAM(S): 4; .5 INJECTION, POWDER, LYOPHILIZED, FOR SOLUTION INTRAVENOUS at 13:58

## 2017-08-15 RX ADMIN — HEPARIN SODIUM 5000 UNIT(S): 5000 INJECTION INTRAVENOUS; SUBCUTANEOUS at 06:35

## 2017-08-15 RX ADMIN — CLOPIDOGREL BISULFATE 75 MILLIGRAM(S): 75 TABLET, FILM COATED ORAL at 13:58

## 2017-08-15 RX ADMIN — Medication 81 MILLIGRAM(S): at 13:58

## 2017-08-15 RX ADMIN — HEPARIN SODIUM 5000 UNIT(S): 5000 INJECTION INTRAVENOUS; SUBCUTANEOUS at 17:47

## 2017-08-15 RX ADMIN — Medication 100 MILLIGRAM(S): at 02:25

## 2017-08-15 RX ADMIN — Medication 100 MILLIGRAM(S): at 20:20

## 2017-08-15 RX ADMIN — ALBUTEROL 2.5 MILLIGRAM(S): 90 AEROSOL, METERED ORAL at 14:13

## 2017-08-15 RX ADMIN — CARBIDOPA AND LEVODOPA 1 TABLET(S): 25; 100 TABLET ORAL at 07:10

## 2017-08-15 RX ADMIN — Medication 100 MILLIGRAM(S): at 06:35

## 2017-08-15 RX ADMIN — CARBIDOPA AND LEVODOPA 1 TABLET(S): 25; 100 TABLET ORAL at 13:58

## 2017-08-15 RX ADMIN — Medication 100 MILLIGRAM(S): at 05:29

## 2017-08-15 NOTE — PROGRESS NOTE ADULT - SUBJECTIVE AND OBJECTIVE BOX
HPI:  88 y/o male with PMHx of HTN, gout, HLd, CAD, CKD stage III, Parkinson's, and recent admission to  1 month ago now admitted on  for evaluation of 2-3 days cough, shortness of breath, similar to complaints that his sick wife has; notes she was admitted with pneumonia as well. Upon admission the patient was hypoxic with oxygen saturation to 88% on room air.  Today 8/15 patient notes improvement in his cough, less short of breath    MEDICATIONS  (STANDING):  piperacillin/tazobactam IVPB. 3.375 Gram(s) IV Intermittent every 8 hours  atorvastatin 20 milliGRAM(s) Oral at bedtime  amLODIPine   Tablet 5 milliGRAM(s) Oral daily  clopidogrel Tablet 75 milliGRAM(s) Oral daily  carbidopa/levodopa  25/100 1 Tablet(s) Oral three times a day  metoprolol succinate  milliGRAM(s) Oral daily  ALBUTerol    0.083% 2.5 milliGRAM(s) Nebulizer every 6 hours  febuxostat 40 milliGRAM(s) Oral daily  aspirin  chewable 81 milliGRAM(s) Oral daily  heparin  Injectable 5000 Unit(s) SubCutaneous every 12 hours  vancomycin  IVPB 500 milliGRAM(s) IV Intermittent every 12 hours    MEDICATIONS  (PRN):  acetaminophen   Tablet 650 milliGRAM(s) Oral every 6 hours PRN For Temp greater than 38 C (100.4 F)  guaiFENesin    Syrup 100 milliGRAM(s) Oral every 6 hours PRN Cough      Vital Signs Last 24 Hrs  T(C): 36.7 (15 Aug 2017 06:05), Max: 37.4 (14 Aug 2017 21:02)  T(F): 98 (15 Aug 2017 06:05), Max: 99.4 (14 Aug 2017 21:02)  HR: 72 (15 Aug 2017 06:05) (72 - 85)  BP: 131/49 (15 Aug 2017 06:05) (97/73 - 131/49)  BP(mean): --  RR: 18 (15 Aug 2017 06:05) (18 - 20)  SpO2: 100% (15 Aug 2017 06:05) (93% - 100%)    Physical Exam:            Constitutional: frail looking  HEENT: NC/AT, EOMI, PERRLA  Neck: supple  Respiratory: decreased breath sounds at bases, scattered coarse breath sounds  Cardiovascular: S1S2 regular, no murmurs  Abdomen: soft, not tender, not distended, positive BS  Genitourinary: deferred  Rectal: deferred  Musculoskeletal: no muscle tenderness, no joint swelling or tenderness  Neurological: AxOx3, moving all extremities, no focal deficits  Skin: no rashes                          10.2   7.3   )-----------( 163      ( 14 Aug 2017 05:38 )             30.5         139  |  106  |  21  ----------------------------<  121<H>  3.7   |  23  |  1.45<H>    Ca    9.0      14 Aug 2017 05:38    TPro  7.0  /  Alb  3.1<L>  /  TBili  0.5  /  DBili  x   /  AST  82<H>  /  ALT  42  /  AlkPhos  136<H>  08     LIVER FUNCTIONS - ( 13 Aug 2017 14:49 )  Alb: 3.1 g/dL / Pro: 7.0 gm/dL / ALK PHOS: 136 U/L / ALT: 42 U/L / AST: 82 U/L / GGT: x           Urinalysis Basic - ( 13 Aug 2017 15:52 )    Color: Yellow / Appearance: Clear / S.015 / pH: x  Gluc: x / Ketone: Negative  / Bili: Negative / Urobili: Negative mg/dL   Blood: x / Protein: 30 mg/dL / Nitrite: Negative   Leuk Esterase: Negative / RBC: 3-5 /HPF / WBC 0-2   Sq Epi: x / Non Sq Epi: x / Bacteria: Few    Culture - Blood (17 @ 14:49)    Specimen Source: .Blood None    Culture Results:   No growth to date.    Culture - Blood (17 @ 14:49)    Specimen Source: .Blood None    Culture Results:   No growth to date.    Culture - Urine (17 @ 15:52)    Specimen Source: .Urine None    Culture Results:   10,000 - 49,000 CFU/mL Aerococcus species  There is no standardized, established, or validated  method for susceptibility testing of this organism.  "Aerococcus spp. are predictably susceptible to penicillin,  ampicillin, tetracycline, and vancomycin.  All isolates are  resistant to sulfonamides"          Radiology:< from: Xray Chest 1 View AP/PA. (17 @ 16:33) >  EXAM:  CHEST SINGLE VIEW FRONTAL                            PROCEDURE DATE:  2017          INTERPRETATION:  Exam Date: 2017 4:33 PM    History: Cough    Technique: Single frontal portable view of the chest with comparison to    2017    Findings:    Left-sided pacemaker. Heart is mildly enlarged. The lungs are grossly   clear. The apices and hemidiaphragms are unremarkable. Degenerative   changes of the visualized osseous structures.        Impression:    No acute disease    No significant interval change as compared to  2017    < end of copied text >      Advanced directive addressed: full resuscitation

## 2017-08-15 NOTE — PROGRESS NOTE ADULT - SUBJECTIVE AND OBJECTIVE BOX
Patient is a 89y old  Male who presents with a chief complaint of I have a cough and fever (13 Aug 2017 21:55)        HPI:  88 y/o male with PMHx of HTN, gout, HLd, CAD, CKD stage III, Parkinson's, and recent admission to  1 months about for chest pain that present from home (after being at [facility]) with complaints of worsening dry cough, generalized fatigue, malaise and chills.  Patient states that these symptoms have worsened over the last 2-3 days.  He states that this cough is associated with chest tightness and sore throat.  He has history of + sick contact as his wife has similar symptoms.  Patient denies chills, nausea, vomiting, headache, or other symptom    ED Course - patient is hypoxic to 88% on RA  EKG unchanged from previouse (13 Aug 2017 21:55)      SUBJECTIVE & OBJECTIVE:     Pt seen and examined at bedside this afternoon.  He appears well though still requiring nasal 02 and being SOB while ambulating.  No acute events overnight, no fevers, no chills.    Pt seen and examined at bedside.     PHYSICAL EXAM:  T(C): 36.8 (08-15-17 @ 11:51), Max: 37.4 (17 @ 21:02)  HR: 70 (08-15-17 @ 11:51) (70 - 85)  BP: 129/50 (08-15- @ 11:51) (97/73 - 131/49)  RR: 18 (08-15-17 @ 11:51) (18 - 18)  SpO2: 100% (08-15-17 @ 11:51) (93% - 100%)  Wt(kg): --   GENERAL: NAD, well-groomed, well-developed  HEAD:  Atraumatic, Normocephalic  EYES: EOMI, PERRLA, conjunctiva and sclera clear  ENMT: Moist mucous membranes  NECK: Supple, No JVD  NERVOUS SYSTEM:  Alert & Oriented X3, Motor Strength 5/5 B/L upper and lower extremities; DTRs 2+ intact and symmetric  CHEST/LUNG: Clear to auscultation bilaterally; No rales, rhonchi, wheezing, or rubs  HEART: Regular rate and rhythm; No murmurs, rubs, or gallops  ABDOMEN: Soft, Nontender, Nondistended; Bowel sounds present  EXTREMITIES:  2+ Peripheral Pulses, No clubbing, cyanosis, or edema        MEDICATIONS  (STANDING):  piperacillin/tazobactam IVPB. 3.375 Gram(s) IV Intermittent every 8 hours  atorvastatin 20 milliGRAM(s) Oral at bedtime  amLODIPine   Tablet 5 milliGRAM(s) Oral daily  clopidogrel Tablet 75 milliGRAM(s) Oral daily  carbidopa/levodopa  25/100 1 Tablet(s) Oral three times a day  metoprolol succinate  milliGRAM(s) Oral daily  ALBUTerol    0.083% 2.5 milliGRAM(s) Nebulizer every 6 hours  febuxostat 40 milliGRAM(s) Oral daily  aspirin  chewable 81 milliGRAM(s) Oral daily  heparin  Injectable 5000 Unit(s) SubCutaneous every 12 hours  vancomycin  IVPB 500 milliGRAM(s) IV Intermittent every 12 hours    MEDICATIONS  (PRN):  acetaminophen   Tablet 650 milliGRAM(s) Oral every 6 hours PRN For Temp greater than 38 C (100.4 F)  guaiFENesin    Syrup 100 milliGRAM(s) Oral every 6 hours PRN Cough      LABS:                        9.8    7.3   )-----------( 185      ( 15 Aug 2017 06:50 )             29.4     08-15    139  |  106  |  27<H>  ----------------------------<  158<H>  3.6   |  25  |  1.65<H>    Ca    8.8      15 Aug 2017 06:50  Mg     2.0     08-15    TPro  7.0  /  Alb  3.1<L>  /  TBili  0.5  /  DBili  x   /  AST  82<H>  /  ALT  42  /  AlkPhos  136<H>  08  PT/INR - ( 13 Aug 2017 14:49 )   PT: 12.7 sec;   INR: 1.17 ratio    PTT - ( 13 Aug 2017 14:49 )  PTT:33.4 sec  Urinalysis Basic - ( 13 Aug 2017 15:52 )    Color: Yellow / Appearance: Clear / S.015 / pH: x  Gluc: x / Ketone: Negative  / Bili: Negative / Urobili: Negative mg/dL   Blood: x / Protein: 30 mg/dL / Nitrite: Negative   Leuk Esterase: Negative / RBC: 3-5 /HPF / WBC 0-2   Sq Epi: x / Non Sq Epi: x / Bacteria: Few  Magnesium, Serum: 2.0 mg/dL (08-15 @ 06:50)    CARDIAC MARKERS ( 13 Aug 2017 14:49 )  <0.015 ng/mL / x     / x     / x     / x        RECENT CULTURES:  Culture - Urine (17 @ 15:52)    Specimen Source: .Urine None    Culture Results:   10,000 - 49,000 CFU/mL Aerococcus species  There is no standardized, established, or validated  method for susceptibility testing of this organism.  "Aerococcus spp. are predictably susceptible to penicillin,  ampicillin, tetracycline, and vancomycin.  All isolates are  resistant to sulfonamides"    Culture - Blood (17 @ 14:49)    Specimen Source: .Blood None    Culture Results:   No growth to date.    Culture - Blood (17 @ 14:49)    Specimen Source: .Blood None    Culture Results:   No growth to date.      DVT/GI ppx  Discussed with pt @ bedside Patient is a 89y old  Male who presents with a chief complaint of I have a cough and fever (13 Aug 2017 21:55)        HPI:  89 y/o male with PMHx of HTN, gout, HLd, CAD, CKD stage III, Parkinson's, and recent admission to  1 months about for chest pain that present from home (after being at [facility]) with complaints of worsening dry cough, generalized fatigue, malaise and chills.  Patient states that these symptoms have worsened over the last 2-3 days.  He states that this cough is associated with chest tightness and sore throat.  He has history of + sick contact as his wife has similar symptoms.  Patient denies chills, nausea, vomiting, headache, or other symptom    ED Course - patient is hypoxic to 88% on RA  EKG unchanged from previouse (13 Aug 2017 21:55)      SUBJECTIVE & OBJECTIVE:     Pt seen and examined at bedside this afternoon.  He appears well though still requiring nasal 02 and being SOB while ambulating.  No acute events overnight, no fevers, no chills.    8/15  Pt seen and examined at bedside this morning.  No longer on Home 02.  Improving SOB.  No fevers, chills, SOB.  Has some anxiety about his wife's condition.     PHYSICAL EXAM:  T(C): 36.8 (08-15-17 @ 11:51), Max: 37.4 (17 @ 21:02)  HR: 70 (08-15-17 @ 11:51) (70 - 85)  BP: 129/50 (08-15-17 @ 11:51) (97/73 - 131/49)  RR: 18 (08-15-17 @ 11:51) (18 - 18)  SpO2: 100% (08-15-17 @ 11:51) (93% - 100%)  Wt(kg): --   GENERAL: NAD, well-groomed, well-developed  HEAD:  Atraumatic, Normocephalic  EYES: EOMI, PERRLA, conjunctiva and sclera clear  ENMT: Moist mucous membranes  NECK: Supple, No JVD  NERVOUS SYSTEM:  Alert & Oriented X3, Motor Strength 5/5 B/L upper and lower extremities; DTRs 2+ intact and symmetric  CHEST/LUNG: left crackles to the lower lung base.   HEART: Regular rate and rhythm; No murmurs, rubs, or gallops  ABDOMEN: Soft, Nontender, Nondistended; Bowel sounds present  EXTREMITIES:  2+ Peripheral Pulses, No clubbing, cyanosis, or edema        MEDICATIONS  (STANDING):  piperacillin/tazobactam IVPB. 3.375 Gram(s) IV Intermittent every 8 hours  atorvastatin 20 milliGRAM(s) Oral at bedtime  amLODIPine   Tablet 5 milliGRAM(s) Oral daily  clopidogrel Tablet 75 milliGRAM(s) Oral daily  carbidopa/levodopa  25/100 1 Tablet(s) Oral three times a day  metoprolol succinate  milliGRAM(s) Oral daily  ALBUTerol    0.083% 2.5 milliGRAM(s) Nebulizer every 6 hours  febuxostat 40 milliGRAM(s) Oral daily  aspirin  chewable 81 milliGRAM(s) Oral daily  heparin  Injectable 5000 Unit(s) SubCutaneous every 12 hours  vancomycin  IVPB 500 milliGRAM(s) IV Intermittent every 12 hours    MEDICATIONS  (PRN):  acetaminophen   Tablet 650 milliGRAM(s) Oral every 6 hours PRN For Temp greater than 38 C (100.4 F)  guaiFENesin    Syrup 100 milliGRAM(s) Oral every 6 hours PRN Cough      LABS:                        9.8    7.3   )-----------( 185      ( 15 Aug 2017 06:50 )             29.4     08-15    139  |  106  |  27<H>  ----------------------------<  158<H>  3.6   |  25  |  1.65<H>    Ca    8.8      15 Aug 2017 06:50  Mg     2.0     08-15    TPro  7.0  /  Alb  3.1<L>  /  TBili  0.5  /  DBili  x   /  AST  82<H>  /  ALT  42  /  AlkPhos  136<H>  08-13  PT/INR - ( 13 Aug 2017 14:49 )   PT: 12.7 sec;   INR: 1.17 ratio    PTT - ( 13 Aug 2017 14:49 )  PTT:33.4 sec  Urinalysis Basic - ( 13 Aug 2017 15:52 )    Color: Yellow / Appearance: Clear / S.015 / pH: x  Gluc: x / Ketone: Negative  / Bili: Negative / Urobili: Negative mg/dL   Blood: x / Protein: 30 mg/dL / Nitrite: Negative   Leuk Esterase: Negative / RBC: 3-5 /HPF / WBC 0-2   Sq Epi: x / Non Sq Epi: x / Bacteria: Few  Magnesium, Serum: 2.0 mg/dL (08-15 @ 06:50)    CARDIAC MARKERS ( 13 Aug 2017 14:49 )  <0.015 ng/mL / x     / x     / x     / x        RECENT CULTURES:  Culture - Urine (17 @ 15:52)    Specimen Source: .Urine None    Culture Results:   10,000 - 49,000 CFU/mL Aerococcus species  There is no standardized, established, or validated  method for susceptibility testing of this organism.  "Aerococcus spp. are predictably susceptible to penicillin,  ampicillin, tetracycline, and vancomycin.  All isolates are  resistant to sulfonamides"    Culture - Blood (17 @ 14:49)    Specimen Source: .Blood None    Culture Results:   No growth to date.    Culture - Blood (17 @ 14:49)    Specimen Source: .Blood None    Culture Results:   No growth to date.      DVT/GI ppx  Discussed with pt @ bedside

## 2017-08-15 NOTE — PROGRESS NOTE ADULT - ASSESSMENT
89 y/o male with PMHx of HTN, gout, HLd, CAD, CKD stage III, Parkinson's, and recent admission to  1 month ago now admitted on 8/13 for evaluation of 2-3 days cough, shortness of breath, similar to complaints that his sick wife has; notes she was admitted with pneumonia as well. Upon admission the patient was hypoxic with oxygen saturation to 88% on room air.  1. Patient admitted with respiratory complaints, possible early pneumonia, though chest xray is clear  - would consider to treat as health care associated pneumonia  - patient at risk for gram negative rods and other resistant bacteria   - oxygen and nebs as needed   - iv hydration and supportive care   - day #2 zosyn and vancomycin  - tolerating antibiotics without rashes or side effects    - check vancomycin trough prior to fourth dose   2. other issues:  HTN, gout, HLd, CAD, CKD stage III, Parkinson's  - per medicine

## 2017-08-15 NOTE — CDI QUERY NOTE - NSCDIRESPTXTBX_GEN_ALL_CORE_HH
Patient admitted with HCAP in setting of + chest findings, fevers, and recent d/c from [facility].  Being treated with Zosyn IV q8h and Vanco IV q12h    Please clarify the suspected/ likely/ or probable type of HCAP being treated necessitating the use of IV Zosyn and Vanco ?  ie... Suspected GNR pneumonia ?  .....  Likely MRSA pneumonia ?  .....  Probable Aspiration pneumonia /  .....  Other ? Please clarify

## 2017-08-15 NOTE — PROGRESS NOTE ADULT - ASSESSMENT
89 y/o male with PMHx of HTN, gout, HLd, CAD, CKD stage III, Parkinson's, and recent admission to  1 months about for chest pain that present from home (after being at [facility]) with complaints of worsening dry cough, generalized fatigue, malaise and chills.    social work and case management inputs requested at patient has fears of returning home alone will need assistance  PT consult     Problem/Plan - 1:  ·  Problem: Pneumonia due to infectious organism, unspecified laterality, unspecified part of lung.  Plan: cbc wnl, follow am value  CXR does not demonstrate consolidation but will treat for HCAP in setting of + chest findings, fevers, and recent d/c from [facility]  albuterol nebs q6hour, chest PT  Vancomycin 500 mg Q 12 hours to follow trough pre 4th dose per ID  continuing Zosyn 3/375 mg IVPB Q 8 hours   following blood culture, urine culture  tylenol for fever >100.4F  ID consult appreciated      Problem/Plan - 2:  ·  Problem: GARRETT (acute kidney injury).  Plan: likely secondary to dehydration  allow for oral hydration  follow am bmp slightly improved follow am bmp    Problem/Plan - 3:  ·  Problem: Stage 3 chronic kidney disease.  Plan: as above  renal dosing of abx.     Problem/Plan - 4:  ·  Problem: Type 2 diabetes mellitus without complication, without long-term current use of insulin.  Plan: diet controlled.     Problem/Plan - 5:  ·  Problem: Essential hypertension.  Plan: continuing to follow bp and resuming home medications.     Problem/Plan - 6:  Problem: Parkinson disease. Plan: continuing sinemet.    Problem/Plan - 7:  ·  Problem: Mild protein-calorie malnutrition.  Plan: prealbumin in am  nutrition assessment. 88 y/o male with PMHx of HTN, gout, HLd, CAD, CKD stage III, Parkinson's, and recent admission to  1 month ago with     Problem/Plan - 1:  HCAP Pneumonia - suspected MRSA  albuterol nebs q6hour, chest PT  Vancomycin 500 mg Q 12 hours to follow trough pre 4th dose per ID  continuing Zosyn 3/375 mg IVPB Q 8 hours   following blood culture, urine culture  tylenol for fever >100.4F  ID consult appreciated      Problem/Plan - 2:  ·  Problem: GARRETT (acute kidney injury).  Plan: worsened today likely secondary to Vancomycin, continue to monitor    Problem/Plan - 3:  ·  Problem: Stage 3 chronic kidney disease.  Plan: as above  renal dosing of abx.     Problem/Plan - 4:  ·  Problem: Type 2 diabetes mellitus without complication, without long-term current use of insulin.  Plan: diet controlled.     Problem/Plan - 5:  ·  Problem: Essential hypertension.  Plan: continuing to follow bp and resuming home medications.     Problem/Plan - 6:  Problem: Parkinson disease. Plan: continuing sinemet.    Problem/Plan - 7:  ·  Problem: Mild protein-calorie malnutrition.  Plan: prealbumin in am  nutrition assessment.

## 2017-08-16 LAB
ANION GAP SERPL CALC-SCNC: 9 MMOL/L — SIGNIFICANT CHANGE UP (ref 5–17)
BUN SERPL-MCNC: 28 MG/DL — HIGH (ref 7–23)
CALCIUM SERPL-MCNC: 10.2 MG/DL — HIGH (ref 8.5–10.1)
CHLORIDE SERPL-SCNC: 107 MMOL/L — SIGNIFICANT CHANGE UP (ref 96–108)
CO2 SERPL-SCNC: 27 MMOL/L — SIGNIFICANT CHANGE UP (ref 22–31)
CREAT SERPL-MCNC: 1.65 MG/DL — HIGH (ref 0.5–1.3)
GLUCOSE SERPL-MCNC: 98 MG/DL — SIGNIFICANT CHANGE UP (ref 70–99)
POTASSIUM SERPL-MCNC: 4.2 MMOL/L — SIGNIFICANT CHANGE UP (ref 3.5–5.3)
POTASSIUM SERPL-SCNC: 4.2 MMOL/L — SIGNIFICANT CHANGE UP (ref 3.5–5.3)
SODIUM SERPL-SCNC: 143 MMOL/L — SIGNIFICANT CHANGE UP (ref 135–145)

## 2017-08-16 RX ORDER — CEFUROXIME AXETIL 250 MG
250 TABLET ORAL EVERY 12 HOURS
Qty: 0 | Refills: 0 | Status: DISCONTINUED | OUTPATIENT
Start: 2017-08-17 | End: 2017-08-18

## 2017-08-16 RX ADMIN — AMLODIPINE BESYLATE 5 MILLIGRAM(S): 2.5 TABLET ORAL at 06:46

## 2017-08-16 RX ADMIN — Medication 200 MILLIGRAM(S): at 17:34

## 2017-08-16 RX ADMIN — ALBUTEROL 2.5 MILLIGRAM(S): 90 AEROSOL, METERED ORAL at 14:10

## 2017-08-16 RX ADMIN — CARBIDOPA AND LEVODOPA 1 TABLET(S): 25; 100 TABLET ORAL at 21:16

## 2017-08-16 RX ADMIN — Medication 200 MILLIGRAM(S): at 14:04

## 2017-08-16 RX ADMIN — ATORVASTATIN CALCIUM 20 MILLIGRAM(S): 80 TABLET, FILM COATED ORAL at 21:16

## 2017-08-16 RX ADMIN — PIPERACILLIN AND TAZOBACTAM 25 GRAM(S): 4; .5 INJECTION, POWDER, LYOPHILIZED, FOR SOLUTION INTRAVENOUS at 06:45

## 2017-08-16 RX ADMIN — ALBUTEROL 2.5 MILLIGRAM(S): 90 AEROSOL, METERED ORAL at 02:53

## 2017-08-16 RX ADMIN — FEBUXOSTAT 40 MILLIGRAM(S): 40 TABLET ORAL at 12:03

## 2017-08-16 RX ADMIN — Medication 100 MILLIGRAM(S): at 17:38

## 2017-08-16 RX ADMIN — Medication 100 MILLIGRAM(S): at 06:46

## 2017-08-16 RX ADMIN — PIPERACILLIN AND TAZOBACTAM 25 GRAM(S): 4; .5 INJECTION, POWDER, LYOPHILIZED, FOR SOLUTION INTRAVENOUS at 14:03

## 2017-08-16 RX ADMIN — Medication 100 MILLIGRAM(S): at 05:30

## 2017-08-16 RX ADMIN — HEPARIN SODIUM 5000 UNIT(S): 5000 INJECTION INTRAVENOUS; SUBCUTANEOUS at 17:34

## 2017-08-16 RX ADMIN — PIPERACILLIN AND TAZOBACTAM 25 GRAM(S): 4; .5 INJECTION, POWDER, LYOPHILIZED, FOR SOLUTION INTRAVENOUS at 21:16

## 2017-08-16 RX ADMIN — HEPARIN SODIUM 5000 UNIT(S): 5000 INJECTION INTRAVENOUS; SUBCUTANEOUS at 06:46

## 2017-08-16 RX ADMIN — CARBIDOPA AND LEVODOPA 1 TABLET(S): 25; 100 TABLET ORAL at 06:46

## 2017-08-16 RX ADMIN — Medication 100 MILLIGRAM(S): at 04:35

## 2017-08-16 RX ADMIN — ALBUTEROL 2.5 MILLIGRAM(S): 90 AEROSOL, METERED ORAL at 19:36

## 2017-08-16 RX ADMIN — Medication 81 MILLIGRAM(S): at 12:00

## 2017-08-16 RX ADMIN — CLOPIDOGREL BISULFATE 75 MILLIGRAM(S): 75 TABLET, FILM COATED ORAL at 12:00

## 2017-08-16 RX ADMIN — CARBIDOPA AND LEVODOPA 1 TABLET(S): 25; 100 TABLET ORAL at 14:04

## 2017-08-16 RX ADMIN — ALBUTEROL 2.5 MILLIGRAM(S): 90 AEROSOL, METERED ORAL at 08:22

## 2017-08-16 NOTE — PROGRESS NOTE ADULT - SUBJECTIVE AND OBJECTIVE BOX
HPI:  88 y/o male with PMHx of HTN, gout, HLd, CAD, CKD stage III, Parkinson's, and recent admission to  1 month ago now admitted on  for evaluation of 2-3 days cough, shortness of breath, similar to complaints that his sick wife has; notes she was admitted with pneumonia as well. Upon admission the patient was hypoxic with oxygen saturation to 88% on room air.  Today 8/15 patient notes improvement in his cough, less short of breath  Today  patient much less short of breath      MEDICATIONS  (STANDING):  piperacillin/tazobactam IVPB. 3.375 Gram(s) IV Intermittent every 8 hours  atorvastatin 20 milliGRAM(s) Oral at bedtime  amLODIPine   Tablet 5 milliGRAM(s) Oral daily  clopidogrel Tablet 75 milliGRAM(s) Oral daily  carbidopa/levodopa  25/100 1 Tablet(s) Oral three times a day  metoprolol succinate  milliGRAM(s) Oral daily  ALBUTerol    0.083% 2.5 milliGRAM(s) Nebulizer every 6 hours  febuxostat 40 milliGRAM(s) Oral daily  aspirin  chewable 81 milliGRAM(s) Oral daily  heparin  Injectable 5000 Unit(s) SubCutaneous every 12 hours  vancomycin  IVPB 500 milliGRAM(s) IV Intermittent every 12 hours    MEDICATIONS  (PRN):  acetaminophen   Tablet 650 milliGRAM(s) Oral every 6 hours PRN For Temp greater than 38 C (100.4 F)  guaiFENesin    Syrup 100 milliGRAM(s) Oral every 6 hours PRN Cough      Vital Signs Last 24 Hrs  T(C): 36.6 (16 Aug 2017 05:50), Max: 36.8 (15 Aug 2017 11:51)  T(F): 97.9 (16 Aug 2017 05:50), Max: 98.3 (15 Aug 2017 11:51)  HR: 69 (16 Aug 2017 05:50) (69 - 86)  BP: 135/63 (16 Aug 2017 05:50) (125/67 - 135/63)  BP(mean): --  RR: 18 (16 Aug 2017 05:50) (17 - 18)  SpO2: 95% (16 Aug 2017 05:50) (95% - 100%)    Physical Exam:                            10.2   7.3   )-----------( 163      ( 14 Aug 2017 05:38 )             30.5         139  |  106  |  21  ----------------------------<  121<H>  3.7   |  23  |  1.45<H>    Ca    9.0      14 Aug 2017 05:38    TPro  7.0  /  Alb  3.1<L>  /  TBili  0.5  /  DBili  x   /  AST  82<H>  /  ALT  42  /  AlkPhos  136<H>       LIVER FUNCTIONS - ( 13 Aug 2017 14:49 )  Alb: 3.1 g/dL / Pro: 7.0 gm/dL / ALK PHOS: 136 U/L / ALT: 42 U/L / AST: 82 U/L / GGT: x           Urinalysis Basic - ( 13 Aug 2017 15:52 )    Color: Yellow / Appearance: Clear / S.015 / pH: x  Gluc: x / Ketone: Negative  / Bili: Negative / Urobili: Negative mg/dL   Blood: x / Protein: 30 mg/dL / Nitrite: Negative   Leuk Esterase: Negative / RBC: 3-5 /HPF / WBC 0-2   Sq Epi: x / Non Sq Epi: x / Bacteria: Few    Culture - Blood (17 @ 14:49)    Specimen Source: .Blood None    Culture Results:   No growth to date.    Culture - Blood (17 @ 14:49)    Specimen Source: .Blood None    Culture Results:   No growth to date.    Culture - Urine (17 @ 15:52)    Specimen Source: .Urine None    Culture Results:   10,000 - 49,000 CFU/mL Aerococcus species  There is no standardized, established, or validated  method for susceptibility testing of this organism.  "Aerococcus spp. are predictably susceptible to penicillin,  ampicillin, tetracycline, and vancomycin.  All isolates are  resistant to sulfonamides"          Radiology:< from: Xray Chest 1 View AP/PA. (17 @ 16:33) >  EXAM:  CHEST SINGLE VIEW FRONTAL                            PROCEDURE DATE:  2017          INTERPRETATION:  Exam Date: 2017 4:33 PM    History: Cough    Technique: Single frontal portable view of the chest with comparison to    2017    Findings:    Left-sided pacemaker. Heart is mildly enlarged. The lungs are grossly   clear. The apices and hemidiaphragms are unremarkable. Degenerative   changes of the visualized osseous structures.        Impression:    No acute disease    No significant interval change as compared to  2017    < end of copied text >      Advanced directive addressed: full resuscitation

## 2017-08-16 NOTE — PROGRESS NOTE ADULT - ASSESSMENT
88 y/o male with PMHx of HTN, gout, HLd, CAD, CKD stage III, Parkinson's, and recent admission to  1 month ago now admitted on 8/13 for evaluation of 2-3 days cough, shortness of breath, similar to complaints that his sick wife has; notes she was admitted with pneumonia as well. Upon admission the patient was hypoxic with oxygen saturation to 88% on room air.  1. Patient admitted with respiratory complaints, possible early pneumonia, though chest xray is clear  - would consider to treat as health care associated pneumonia  - patient at risk for gram negative rods and other resistant bacteria   - oxygen and nebs as needed   - iv hydration and supportive care   - day #3 zosyn and vancomycin  - will change to ceftin in am of 8/17 for 7 more days  2. other issues:  HTN, gout, HLd, CAD, CKD stage III, Parkinson's  - per medicine  If further ID issues please reconsult

## 2017-08-16 NOTE — PROGRESS NOTE ADULT - SUBJECTIVE AND OBJECTIVE BOX
CHIEF COMPLAINT:    SUBJECTIVE:     REVIEW OF SYSTEMS:    CONSTITUTIONAL: No weakness, fevers or chills  EYES/ENT: No visual changes;  No vertigo or throat pain   NECK: No pain or stiffness  RESPIRATORY: No cough, wheezing, hemoptysis; No shortness of breath  CARDIOVASCULAR: No chest pain or palpitations  GASTROINTESTINAL: No abdominal or epigastric pain. No nausea, vomiting, or hematemesis; No diarrhea or constipation. No melena or hematochezia.  GENITOURINARY: No dysuria, frequency or hematuria  NEUROLOGICAL: No numbness or weakness  SKIN: No itching, burning, rashes, or lesions   All other review of systems is negative unless indicated above    Vital Signs Last 24 Hrs  T(C): 36.6 (16 Aug 2017 05:50), Max: 36.8 (15 Aug 2017 11:51)  T(F): 97.9 (16 Aug 2017 05:50), Max: 98.3 (15 Aug 2017 11:51)  HR: 69 (16 Aug 2017 05:50) (69 - 86)  BP: 135/63 (16 Aug 2017 05:50) (125/67 - 135/63)  BP(mean): --  RR: 18 (16 Aug 2017 05:50) (17 - 18)  SpO2: 95% (16 Aug 2017 05:50) (95% - 100%)    I&O's Summary    15 Aug 2017 07:01  -  16 Aug 2017 07:00  --------------------------------------------------------  IN: 440 mL / OUT: 0 mL / NET: 440 mL        CAPILLARY BLOOD GLUCOSE          PHYSICAL EXAM:    Constitutional: NAD, awake and alert, well-developed  HEENT: PERR, EOMI, Normal Hearing, MMM  Neck: Soft and supple, No LAD, No JVD  Respiratory: Breath sounds are clear bilaterally, No wheezing, rales or rhonchi  Cardiovascular: S1 and S2, regular rate and rhythm, no Murmurs, gallops or rubs  Gastrointestinal: Bowel Sounds present, soft, nontender, nondistended, no guarding, no rebound  Extremities: No peripheral edema  Vascular: 2+ peripheral pulses  Neurological: A/O x 3, no focal deficits  Musculoskeletal: 5/5 strength b/l upper and lower extremities  Skin: No rashes    MEDICATIONS:  MEDICATIONS  (STANDING):  piperacillin/tazobactam IVPB. 3.375 Gram(s) IV Intermittent every 8 hours  atorvastatin 20 milliGRAM(s) Oral at bedtime  amLODIPine   Tablet 5 milliGRAM(s) Oral daily  clopidogrel Tablet 75 milliGRAM(s) Oral daily  carbidopa/levodopa  25/100 1 Tablet(s) Oral three times a day  metoprolol succinate  milliGRAM(s) Oral daily  ALBUTerol    0.083% 2.5 milliGRAM(s) Nebulizer every 6 hours  febuxostat 40 milliGRAM(s) Oral daily  aspirin  chewable 81 milliGRAM(s) Oral daily  heparin  Injectable 5000 Unit(s) SubCutaneous every 12 hours  vancomycin  IVPB 500 milliGRAM(s) IV Intermittent every 12 hours      LABS: All Labs Reviewed:                        9.8    7.3   )-----------( 185      ( 15 Aug 2017 06:50 )             29.4     08-15    139  |  106  |  27<H>  ----------------------------<  158<H>  3.6   |  25  |  1.65<H>    Ca    8.8      15 Aug 2017 06:50  Mg     2.0     08-15      Assessment and plan    89 y/o male with PMHx of HTN, gout, HLd, CAD, CKD stage III, Parkinson's, and recent admission to  1 month ago with       1-HCAP Pneumonia   albuterol nebs q6hour, chest PT  Vancomycin 500 mg Q 12 hours to follow trough pre 4th dose per ID  continuing Zosyn 3/375 mg IVPB Q 8 hours   following blood culture, urine culture  tylenol for fever >100.4F  ID consult appreciated      2- GARRETT (acute kidney injury) on stage III CKD  .  Plan: worsened today likely secondary to Vancomycin, continue to monitor      3- Type 2 diabetes mellitus without complication, without long-term current use of insulin.  Plan: diet controlled.     4-: Essential hypertension.  Plan: continuing to follow bp and resuming home medications.     5- Parkinson disease. Plan: continuing sinemet.    6- Mild protein-calorie malnutrition.  Plan: prealbumin in am  nutrition assessment.       7-DVTproph-hep sc CHIEF COMPLAINT/Diagnosis: HCAP/ GARRETT    SUBJECTIVE: no complaints    REVIEW OF SYSTEMS:    CONSTITUTIONAL: No weakness, fevers or chills  EYES/ENT: No visual changes;  No vertigo or throat pain   NECK: No pain or stiffness  RESPIRATORY: No cough, wheezing, hemoptysis; No shortness of breath  CARDIOVASCULAR: No chest pain or palpitations  GASTROINTESTINAL: No abdominal or epigastric pain. No nausea, vomiting, or hematemesis; No diarrhea or constipation. No melena or hematochezia.  GENITOURINARY: No dysuria, frequency or hematuria  NEUROLOGICAL: No numbness or weakness  SKIN: No itching, burning, rashes, or lesions   All other review of systems is negative unless indicated above    Vital Signs Last 24 Hrs  T(C): 36.6 (16 Aug 2017 05:50), Max: 36.8 (15 Aug 2017 11:51)  T(F): 97.9 (16 Aug 2017 05:50), Max: 98.3 (15 Aug 2017 11:51)  HR: 69 (16 Aug 2017 05:50) (69 - 86)  BP: 135/63 (16 Aug 2017 05:50) (125/67 - 135/63)  BP(mean): --  RR: 18 (16 Aug 2017 05:50) (17 - 18)  SpO2: 95% (16 Aug 2017 05:50) (95% - 100%)    I&O's Summary    15 Aug 2017 07:01  -  16 Aug 2017 07:00  --------------------------------------------------------  IN: 440 mL / OUT: 0 mL / NET: 440 mL        CAPILLARY BLOOD GLUCOSE          PHYSICAL EXAM:    Constitutional: NAD, awake and alert, well-developed  HEENT: PERR, EOMI, Normal Hearing, MMM  Neck: Soft and supple, No LAD, No JVD  Respiratory: Breath sounds are clear bilaterally, No wheezing, rales or rhonchi  Cardiovascular: S1 and S2, regular rate and rhythm, no Murmurs, gallops or rubs  Gastrointestinal: Bowel Sounds present, soft, nontender, nondistended, no guarding, no rebound  Extremities: No peripheral edema  Vascular: 2+ peripheral pulses  Neurological: A/O x 3, no focal deficits  Musculoskeletal: 5/5 strength b/l upper and lower extremities  Skin: No rashes    MEDICATIONS:  MEDICATIONS  (STANDING):  piperacillin/tazobactam IVPB. 3.375 Gram(s) IV Intermittent every 8 hours  atorvastatin 20 milliGRAM(s) Oral at bedtime  amLODIPine   Tablet 5 milliGRAM(s) Oral daily  clopidogrel Tablet 75 milliGRAM(s) Oral daily  carbidopa/levodopa  25/100 1 Tablet(s) Oral three times a day  metoprolol succinate  milliGRAM(s) Oral daily  ALBUTerol    0.083% 2.5 milliGRAM(s) Nebulizer every 6 hours  febuxostat 40 milliGRAM(s) Oral daily  aspirin  chewable 81 milliGRAM(s) Oral daily  heparin  Injectable 5000 Unit(s) SubCutaneous every 12 hours  vancomycin  IVPB 500 milliGRAM(s) IV Intermittent every 12 hours      LABS: All Labs Reviewed:                        9.8    7.3   )-----------( 185      ( 15 Aug 2017 06:50 )             29.4     08-15    139  |  106  |  27<H>  ----------------------------<  158<H>  3.6   |  25  |  1.65<H>    Ca    8.8      15 Aug 2017 06:50  Mg     2.0     08-15      Assessment and plan    89 y/o male with PMHx of HTN, gout, HLd, CAD, CKD stage III, Parkinson's, and recent admission to  1 month ago with       1-HCAP Pneumonia   -albuterol nebs q6hour, chest PT  -c/w Vanco /Zosyn Day 3  following blood culture, urine culture  tylenol for fever >100.4F  ID consult appreciated      2- GARRETT (acute kidney injury) on stage III CKD  -c/w monitoring    3- Type 2 diabetes mellitus without complication, without long-term current use of insulin.  Plan: diet controlled.     4-: Essential hypertension.  Plan: continuing to follow bp and resuming home medications.     5- Parkinson disease. Plan: continuing sinemet.    6- Mild protein-calorie malnutrition.  Plan: prealbumin in am  nutrition assessment.       7-DVTproph-hep sc

## 2017-08-17 LAB
ANION GAP SERPL CALC-SCNC: 7 MMOL/L — SIGNIFICANT CHANGE UP (ref 5–17)
BUN SERPL-MCNC: 23 MG/DL — SIGNIFICANT CHANGE UP (ref 7–23)
CALCIUM SERPL-MCNC: 10 MG/DL — SIGNIFICANT CHANGE UP (ref 8.5–10.1)
CHLORIDE SERPL-SCNC: 107 MMOL/L — SIGNIFICANT CHANGE UP (ref 96–108)
CO2 SERPL-SCNC: 27 MMOL/L — SIGNIFICANT CHANGE UP (ref 22–31)
CREAT SERPL-MCNC: 1.57 MG/DL — HIGH (ref 0.5–1.3)
GLUCOSE SERPL-MCNC: 116 MG/DL — HIGH (ref 70–99)
POTASSIUM SERPL-MCNC: 4.2 MMOL/L — SIGNIFICANT CHANGE UP (ref 3.5–5.3)
POTASSIUM SERPL-SCNC: 4.2 MMOL/L — SIGNIFICANT CHANGE UP (ref 3.5–5.3)
SODIUM SERPL-SCNC: 141 MMOL/L — SIGNIFICANT CHANGE UP (ref 135–145)

## 2017-08-17 RX ORDER — AMLODIPINE BESYLATE 2.5 MG/1
1 TABLET ORAL
Qty: 0 | Refills: 0 | COMMUNITY

## 2017-08-17 RX ORDER — AMLODIPINE BESYLATE 2.5 MG/1
1 TABLET ORAL
Qty: 0 | Refills: 0 | COMMUNITY
Start: 2017-08-17

## 2017-08-17 RX ORDER — CEFUROXIME AXETIL 250 MG
1 TABLET ORAL
Qty: 0 | Refills: 0 | COMMUNITY
Start: 2017-08-17 | End: 2017-08-24

## 2017-08-17 RX ORDER — ALBUTEROL 90 UG/1
3 AEROSOL, METERED ORAL
Qty: 0 | Refills: 0 | COMMUNITY
Start: 2017-08-17

## 2017-08-17 RX ADMIN — ATORVASTATIN CALCIUM 20 MILLIGRAM(S): 80 TABLET, FILM COATED ORAL at 21:29

## 2017-08-17 RX ADMIN — Medication 100 MILLIGRAM(S): at 05:52

## 2017-08-17 RX ADMIN — CARBIDOPA AND LEVODOPA 1 TABLET(S): 25; 100 TABLET ORAL at 05:52

## 2017-08-17 RX ADMIN — Medication 250 MILLIGRAM(S): at 05:52

## 2017-08-17 RX ADMIN — HEPARIN SODIUM 5000 UNIT(S): 5000 INJECTION INTRAVENOUS; SUBCUTANEOUS at 17:44

## 2017-08-17 RX ADMIN — Medication 200 MILLIGRAM(S): at 05:52

## 2017-08-17 RX ADMIN — AMLODIPINE BESYLATE 5 MILLIGRAM(S): 2.5 TABLET ORAL at 05:52

## 2017-08-17 RX ADMIN — Medication 250 MILLIGRAM(S): at 17:43

## 2017-08-17 RX ADMIN — CARBIDOPA AND LEVODOPA 1 TABLET(S): 25; 100 TABLET ORAL at 13:44

## 2017-08-17 RX ADMIN — Medication 600 MILLIGRAM(S): at 17:43

## 2017-08-17 RX ADMIN — Medication 81 MILLIGRAM(S): at 11:06

## 2017-08-17 RX ADMIN — CARBIDOPA AND LEVODOPA 1 TABLET(S): 25; 100 TABLET ORAL at 21:29

## 2017-08-17 RX ADMIN — ALBUTEROL 2.5 MILLIGRAM(S): 90 AEROSOL, METERED ORAL at 14:00

## 2017-08-17 RX ADMIN — ALBUTEROL 2.5 MILLIGRAM(S): 90 AEROSOL, METERED ORAL at 01:47

## 2017-08-17 RX ADMIN — FEBUXOSTAT 40 MILLIGRAM(S): 40 TABLET ORAL at 11:06

## 2017-08-17 RX ADMIN — ALBUTEROL 2.5 MILLIGRAM(S): 90 AEROSOL, METERED ORAL at 07:55

## 2017-08-17 RX ADMIN — Medication 200 MILLIGRAM(S): at 11:06

## 2017-08-17 RX ADMIN — ALBUTEROL 2.5 MILLIGRAM(S): 90 AEROSOL, METERED ORAL at 19:35

## 2017-08-17 RX ADMIN — CLOPIDOGREL BISULFATE 75 MILLIGRAM(S): 75 TABLET, FILM COATED ORAL at 11:06

## 2017-08-17 NOTE — DISCHARGE NOTE ADULT - HOSPITAL COURSE
88 y/o male with PMHx of HTN, gout, HLd, CAD, CKD stage III, Parkinson's, and recent admission to  1 month ago - presented to the hospital with cough and fever- admitted with Pneumonia and was started on IV ABT.    Vital Signs Last 24 Hrs  T(C): 36.9 (17 Aug 2017 11:15), Max: 36.9 (17 Aug 2017 11:15)  T(F): 98.4 (17 Aug 2017 11:15), Max: 98.4 (17 Aug 2017 11:15)  HR: 74 (17 Aug 2017 11:15) (69 - 81)  BP: 133/62 (17 Aug 2017 11:15) (133/62 - 153/67)  BP(mean): --  RR: 18 (17 Aug 2017 11:15) (18 - 19)  SpO2: 100% (17 Aug 2017 11:15) (97% - 100%)    ROS:   All 10 systems reviewed and found to be negative with the exception of what has been described above.    PE:  Constitutional: NAD, laying in bed  HEENT: NC/AT, EOMI, PERRLA  Neck: supple  Back: no tenderness  Respiratory: LCTA  Cardiovascular: S1S2 regular, no murmurs  Abdomen: soft, not tender, not distended, positive BS  Genitourinary: voiding  Rectal: deferred  Musculoskeletal: no muscle tenderness, no joint swelling or tenderness  Extremities: no pedal edema   Neurological: no focal deficits    MEDICATIONS  (STANDING):  atorvastatin 20 milliGRAM(s) Oral at bedtime  amLODIPine   Tablet 5 milliGRAM(s) Oral daily  clopidogrel Tablet 75 milliGRAM(s) Oral daily  carbidopa/levodopa  25/100 1 Tablet(s) Oral three times a day  metoprolol succinate  milliGRAM(s) Oral daily  ALBUTerol    0.083% 2.5 milliGRAM(s) Nebulizer every 6 hours  febuxostat 40 milliGRAM(s) Oral daily  aspirin  chewable 81 milliGRAM(s) Oral daily  heparin  Injectable 5000 Unit(s) SubCutaneous every 12 hours  cefuroxime   Tablet 250 milliGRAM(s) Oral every 12 hours  guaiFENesin    Syrup 200 milliGRAM(s) Oral every 6 hours    Plan      1-HCAP Pneumonia   -albuterol nebs q6hour, chest PT  -completed IV ABT  -ID consult appreciated    - BC- NGTD  - as per ID ok to change to po ceftin for 7 more days      2- GARRETT (acute kidney injury) on stage III CKD  - improving- repeat renal function panel in 1 week    3- Type 2 diabetes mellitus without complication, without long-term current use of insulin.  Plan: diet controlled.     4-: Essential hypertension.  Plan: continuing to follow bp and resuming home medications.     5- Parkinson disease. Plan: continuing sinemet.    6- Mild protein-calorie malnutrition.  Plan: prealbumin in am  nutrition assessment.       [facility] is medically optimized for discharge home vs [facility]. Case d/w Dr Martin.

## 2017-08-17 NOTE — DISCHARGE NOTE ADULT - CARE PLAN
Principal Discharge DX:	Pneumonia due to infectious organism, unspecified laterality, unspecified part of lung  Goal:	prevetn worsening  Instructions for follow-up, activity and diet:	continue oral antibiotic as prescribed. Please do not skip or miss doses. F/U with your PCP in the community  Notify your PCP for persistent fever, chills or increasing shortness of breath  Secondary Diagnosis:	Parkinson disease  Instructions for follow-up, activity and diet:	continue current medication regimen  Secondary Diagnosis:	Essential hypertension  Instructions for follow-up, activity and diet:	cotninue current antihypertensive regimen  Secondary Diagnosis:	GARRETT (acute kidney injury)  Instructions for follow-up, activity and diet:	repeat renal function panel in 1 week  make sure you are adequately hydrated

## 2017-08-17 NOTE — PROGRESS NOTE ADULT - SUBJECTIVE AND OBJECTIVE BOX
CHIEF COMPLAINT/Diagnosis: HCAP/ garrett    SUBJECTIVE: no complaints    REVIEW OF SYSTEMS:    CONSTITUTIONAL: No weakness, fevers or chills  EYES/ENT: No visual changes;  No vertigo or throat pain   NECK: No pain or stiffness  RESPIRATORY: No cough, wheezing, hemoptysis; No shortness of breath  CARDIOVASCULAR: No chest pain or palpitations  GASTROINTESTINAL: No abdominal or epigastric pain. No nausea, vomiting, or hematemesis; No diarrhea or constipation. No melena or hematochezia.  GENITOURINARY: No dysuria, frequency or hematuria  NEUROLOGICAL: No numbness or weakness  SKIN: No itching, burning, rashes, or lesions   All other review of systems is negative unless indicated above    Vital Signs Last 24 Hrs  T(C): 36.9 (17 Aug 2017 11:15), Max: 36.9 (17 Aug 2017 11:15)  T(F): 98.4 (17 Aug 2017 11:15), Max: 98.4 (17 Aug 2017 11:15)  HR: 72 (17 Aug 2017 14:15) (69 - 81)  BP: 133/62 (17 Aug 2017 11:15) (133/62 - 153/67)  BP(mean): --  RR: 18 (17 Aug 2017 11:15) (18 - 19)  SpO2: 97% (17 Aug 2017 14:15) (97% - 100%)    I&O's Summary    16 Aug 2017 07:01  -  17 Aug 2017 07:00  --------------------------------------------------------  IN: 650 mL / OUT: 0 mL / NET: 650 mL    17 Aug 2017 07:01  -  17 Aug 2017 15:33  --------------------------------------------------------  IN: 330 mL / OUT: 0 mL / NET: 330 mL        CAPILLARY BLOOD GLUCOSE          PHYSICAL EXAM:    Constitutional: NAD, awake and alert, well-developed  HEENT: PERR, EOMI, Normal Hearing, MMM  Neck: Soft and supple, No LAD, No JVD  Respiratory: Breath sounds are clear bilaterally, No wheezing, rales or rhonchi  Cardiovascular: S1 and S2, regular rate and rhythm, no Murmurs, gallops or rubs  Gastrointestinal: Bowel Sounds present, soft, nontender, nondistended, no guarding, no rebound  Extremities: No peripheral edema  Vascular: 2+ peripheral pulses  Neurological: A/O x 3, no focal deficits  Musculoskeletal: 5/5 strength b/l upper and lower extremities  Skin: No rashes    MEDICATIONS:  MEDICATIONS  (STANDING):  atorvastatin 20 milliGRAM(s) Oral at bedtime  amLODIPine   Tablet 5 milliGRAM(s) Oral daily  clopidogrel Tablet 75 milliGRAM(s) Oral daily  carbidopa/levodopa  25/100 1 Tablet(s) Oral three times a day  metoprolol succinate  milliGRAM(s) Oral daily  ALBUTerol    0.083% 2.5 milliGRAM(s) Nebulizer every 6 hours  febuxostat 40 milliGRAM(s) Oral daily  aspirin  chewable 81 milliGRAM(s) Oral daily  heparin  Injectable 5000 Unit(s) SubCutaneous every 12 hours  cefuroxime   Tablet 250 milliGRAM(s) Oral every 12 hours  guaiFENesin  milliGRAM(s) Oral every 12 hours      LABS: All Labs Reviewed:    08-17    141  |  107  |  23  ----------------------------<  116<H>  4.2   |  27  |  1.57<H>    Ca    10.0      17 Aug 2017 07:34          Assessment and plan    89 y/o male with PMHx of HTN, gout, HLd, CAD, CKD stage III, Parkinson's, and recent admission to  1 month ago with       1-HCAP Pneumonia   -albuterol nebs q6hour, chest PT  -c/w Vanco /Zosyn Day 4 >>> change today to oral ceftin.   following blood culture, urine culture  ID consult appreciated      2- GARRETT (acute kidney injury) on stage III CKD  -c/w monitoring  -not back to baseline    3- Type 2 diabetes mellitus without complication, without long-term current use of insulin.  Plan: diet controlled.     4-: Essential hypertension.  Plan: continuing to follow bp and resuming home medications.     5- Parkinson disease. Plan: continuing sinemet.    6- Mild protein-calorie malnutrition.  Plan: prealbumin in am  nutrition assessment.     7-DVTproph-hep sc    8- social- wife is also hospitilized, she is the patients main care taker and provider. Patient very concerned about returning home alone and i have advised him that we will not send him alone.  [facility] rehab therapy for patient, will  be beneficial.   Patient has some concerns with discharge and wants his  to be present when discussion about discharge and rehab . Is requesting social work be present with ?  unclear why  Advised social work to speak with him

## 2017-08-17 NOTE — DISCHARGE NOTE ADULT - PLAN OF CARE
prevetn worsening continue oral antibiotic as prescribed. Please do not skip or miss doses. F/U with your PCP in the community  Notify your PCP for persistent fever, chills or increasing shortness of breath continue current medication regimen kirill current antihypertensive regimen repeat renal function panel in 1 week  make sure you are adequately hydrated

## 2017-08-17 NOTE — DISCHARGE NOTE ADULT - PATIENT PORTAL LINK FT
“You can access the [facility] Patient Portal, offered by [facility], by registering with the following website: [email or web address]”

## 2017-08-17 NOTE — DISCHARGE NOTE ADULT - NS AS ACTIVITY OBS
Walking-Outdoors allowed/Showering allowed/Do not make important decisions/No Heavy lifting/straining/Walking-Indoors allowed/Do not drive or operate machinery

## 2017-08-17 NOTE — DISCHARGE NOTE ADULT - MEDICATION SUMMARY - MEDICATIONS TO TAKE
I will START or STAY ON the medications listed below when I get home from the hospital:    Lialda 1.2 g oral delayed release tablet  -- 1 tab(s) by mouth once a day  -- Indication: For GI agent    aspirin 81 mg oral tablet, chewable  -- 1 tab(s) by mouth once a day  -- Indication: For home emds    Lipitor 10 mg oral tablet  -- 1 tab(s) by mouth once a day (at bedtime)  -- Indication: For hyperlipidemia    Uloric 40 mg oral tablet  -- 1 tab(s) by mouth once a day  -- Indication: For .    Sinemet 25 mg-100 mg oral tablet  -- 1 tab(s) by mouth 3 times a day  -- Indication: For Parkinsons    Plavix 75 mg oral tablet  -- 1 tab(s) by mouth once a day  -- Indication: For CAD    Toprol- mg oral tablet, extended release  -- 1 tab(s) by mouth once a day  -- Indication: For Hypertnesion    albuterol 2.5 mg/3 mL (0.083%) inhalation solution  -- 3 milliliter(s) inhaled every 6 hours  -- Indication: For Nebulizer    amLODIPine 5 mg oral tablet  -- 1 tab(s) by mouth once a day  -- Indication: For hypertnesion    cefuroxime 250 mg oral tablet  -- 1 tab(s) by mouth every 12 hours  -- Indication: For Pneumonia    guaiFENesin 100 mg/5 mL oral liquid  -- 10 milliliter(s) by mouth every 6 hours  -- Indication: For coughing    Acidophilus oral capsule  -- 1 cap(s) by mouth once a day  -- Indication: For Probitoic    ascorbic acid 500 mg oral tablet  -- 1 tab(s) by mouth once a day  -- Indication: For Supplement    cyanocobalamin 2500 mcg sublingual tablet  -- 2 tab(s) under tongue once a day  -- Indication: For Supplement    cholecalciferol 1000 intl units oral tablet  -- 1 tab(s) by mouth once a day  -- Indication: For Supplement

## 2017-08-18 VITALS
OXYGEN SATURATION: 98 % | HEART RATE: 68 BPM | SYSTOLIC BLOOD PRESSURE: 159 MMHG | DIASTOLIC BLOOD PRESSURE: 68 MMHG | RESPIRATION RATE: 18 BRPM | TEMPERATURE: 98 F

## 2017-08-18 RX ADMIN — Medication 100 MILLIGRAM(S): at 06:01

## 2017-08-18 RX ADMIN — Medication 600 MILLIGRAM(S): at 17:30

## 2017-08-18 RX ADMIN — ALBUTEROL 2.5 MILLIGRAM(S): 90 AEROSOL, METERED ORAL at 11:09

## 2017-08-18 RX ADMIN — Medication 600 MILLIGRAM(S): at 06:01

## 2017-08-18 RX ADMIN — CLOPIDOGREL BISULFATE 75 MILLIGRAM(S): 75 TABLET, FILM COATED ORAL at 11:22

## 2017-08-18 RX ADMIN — CARBIDOPA AND LEVODOPA 1 TABLET(S): 25; 100 TABLET ORAL at 14:26

## 2017-08-18 RX ADMIN — Medication 250 MILLIGRAM(S): at 06:01

## 2017-08-18 RX ADMIN — FEBUXOSTAT 40 MILLIGRAM(S): 40 TABLET ORAL at 11:22

## 2017-08-18 RX ADMIN — ALBUTEROL 2.5 MILLIGRAM(S): 90 AEROSOL, METERED ORAL at 02:43

## 2017-08-18 RX ADMIN — HEPARIN SODIUM 5000 UNIT(S): 5000 INJECTION INTRAVENOUS; SUBCUTANEOUS at 17:30

## 2017-08-18 RX ADMIN — Medication 81 MILLIGRAM(S): at 11:22

## 2017-08-18 RX ADMIN — Medication 250 MILLIGRAM(S): at 17:29

## 2017-08-18 RX ADMIN — AMLODIPINE BESYLATE 5 MILLIGRAM(S): 2.5 TABLET ORAL at 06:02

## 2017-08-18 RX ADMIN — CARBIDOPA AND LEVODOPA 1 TABLET(S): 25; 100 TABLET ORAL at 06:01

## 2017-08-18 NOTE — PROGRESS NOTE ADULT - SUBJECTIVE AND OBJECTIVE BOX
8/18- patient was seen and examined. stated that he is upset because he had vivid dreams last nights and feels that no one wants to talk to him about it. Otherwise denies fever, pain and chills. Would like to visit his wife who is also a patient in [facility]    Vital Signs Last 24 Hrs  T(C): 36.4 (18 Aug 2017 11:03), Max: 36.6 (18 Aug 2017 05:34)  T(F): 97.5 (18 Aug 2017 11:03), Max: 97.9 (18 Aug 2017 05:34)  HR: 77 (18 Aug 2017 11:11) (68 - 83)  BP: 159/68 (18 Aug 2017 11:03) (141/58 - 159/68)  BP(mean): --  RR: 18 (18 Aug 2017 11:03) (17 - 18)  SpO2: 98% (18 Aug 2017 11:03) (96% - 100%)    ROS:   All 10 systems reviewed and found to be negative with the exception of what has been described above.    PE:  Constitutional: NAD, laying in bed  HEENT: NC/AT, EOMI, PERRLA  Neck: supple  Back: no tenderness  Respiratory: LCTA  Cardiovascular: S1S2 regular, no murmurs  Abdomen: soft, not tender, not distended, positive BS  Genitourinary: voiding  Musculoskeletal: no muscle tenderness, no joint swelling or tenderness  Extremities: no pedal edema   Neurological: no focal deficits      MEDICATIONS  (STANDING):  atorvastatin 20 milliGRAM(s) Oral at bedtime  amLODIPine   Tablet 5 milliGRAM(s) Oral daily  clopidogrel Tablet 75 milliGRAM(s) Oral daily  carbidopa/levodopa  25/100 1 Tablet(s) Oral three times a day  metoprolol succinate  milliGRAM(s) Oral daily  ALBUTerol    0.083% 2.5 milliGRAM(s) Nebulizer every 6 hours  febuxostat 40 milliGRAM(s) Oral daily  aspirin  chewable 81 milliGRAM(s) Oral daily  heparin  Injectable 5000 Unit(s) SubCutaneous every 12 hours  cefuroxime   Tablet 250 milliGRAM(s) Oral every 12 hours  guaiFENesin  milliGRAM(s) Oral every 12 hours          LABS: All Labs Reviewed:    08-17    < from: Xray Chest 1 View AP/PA. (08.13.17 @ 16:33) >  EXAM:  CHEST SINGLE VIEW FRONTAL                            PROCEDURE DATE:  08/13/2017          INTERPRETATION:  Exam Date: 8/13/2017 4:33 PM    History: Cough    Technique: Single frontal portable view of the chest with comparison to    7/5/2017    Findings:    Left-sided pacemaker. Heart is mildly enlarged. The lungs are grossly   clear. The apices and hemidiaphragms are unremarkable. Degenerative   changes of the visualized osseous structures.        Impression:    No acute disease    < end of copied text >            141  |  107  |  23  ----------------------------<  116<H>  4.2   |  27  |  1.57<H>    Ca    10.0      17 Aug 2017 07:34          Assessment and plan    89 y/o male with PMHx of HTN, gout, HLd, CAD, CKD stage III, Parkinson's, and recent admission to  1 month ago with       1-HCAP Pneumonia   -albuterol nebs q6hour, chest PT  - ID consult appreciated  - completed IV ABT- now switched to po ceftin    2- GARRETT (acute kidney injury) on stage III CKD  -c/w monitoring  - repeat renal function panel in 1 week     3- Type 2 diabetes mellitus without complication, without long-term current use of insulin.  Plan: diet controlled.     4-: Essential hypertension.  Plan: continuing to follow bp and resuming home medications.     5- Parkinson disease. Plan: continuing sinemet.    6- Mild protein-calorie malnutrition.  Plan: prealbumin in am  nutrition assessment.     7-DVTproph-hep sc    8- social- wife is also hospitilized, she is the patients main care taker and provider. Patient very concerned about returning home alone and i have advised him that we will not send him alone.  Reccomend  rehab therapy for patient as this would be benficial- patient now in agreeement and he has a bed in [facility]  Medically optimized for discharge. Case d/w Dr Martin.

## 2017-08-23 DIAGNOSIS — I12.9 HYPERTENSIVE CHRONIC KIDNEY DISEASE WITH STAGE 1 THROUGH STAGE 4 CHRONIC KIDNEY DISEASE, OR UNSPECIFIED CHRONIC KIDNEY DISEASE: ICD-10-CM

## 2017-08-23 DIAGNOSIS — J15.212 PNEUMONIA DUE TO METHICILLIN RESISTANT STAPHYLOCOCCUS AUREUS: ICD-10-CM

## 2017-08-23 DIAGNOSIS — I25.10 ATHEROSCLEROTIC HEART DISEASE OF NATIVE CORONARY ARTERY WITHOUT ANGINA PECTORIS: ICD-10-CM

## 2017-08-23 DIAGNOSIS — Z91.81 HISTORY OF FALLING: ICD-10-CM

## 2017-08-23 DIAGNOSIS — Z79.02 LONG TERM (CURRENT) USE OF ANTITHROMBOTICS/ANTIPLATELETS: ICD-10-CM

## 2017-08-23 DIAGNOSIS — E44.1 MILD PROTEIN-CALORIE MALNUTRITION: ICD-10-CM

## 2017-08-23 DIAGNOSIS — E11.22 TYPE 2 DIABETES MELLITUS WITH DIABETIC CHRONIC KIDNEY DISEASE: ICD-10-CM

## 2017-08-23 DIAGNOSIS — N18.3 CHRONIC KIDNEY DISEASE, STAGE 3 (MODERATE): ICD-10-CM

## 2017-08-23 DIAGNOSIS — E78.5 HYPERLIPIDEMIA, UNSPECIFIED: ICD-10-CM
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
## 2017-08-23 DIAGNOSIS — E86.0 DEHYDRATION: ICD-10-CM

## 2017-08-23 DIAGNOSIS — Z86.39 PERSONAL HISTORY OF OTHER ENDOCRINE, NUTRITIONAL AND METABOLIC DISEASE: ICD-10-CM

## 2017-08-23 DIAGNOSIS — H35.30 UNSPECIFIED MACULAR DEGENERATION: ICD-10-CM

## 2017-08-23 DIAGNOSIS — Z87.891 PERSONAL HISTORY OF NICOTINE DEPENDENCE: ICD-10-CM

## 2017-08-23 DIAGNOSIS — G20 PARKINSON'S DISEASE: ICD-10-CM

## 2017-08-23 DIAGNOSIS — Z79.82 LONG TERM (CURRENT) USE OF ASPIRIN: ICD-10-CM

## 2017-09-11 ENCOUNTER — APPOINTMENT (OUTPATIENT)
Dept: ELECTROPHYSIOLOGY | Facility: CLINIC | Age: 82
End: 2017-09-11

## 2017-09-21 ENCOUNTER — EMERGENCY (EMERGENCY)
Facility: HOSPITAL | Age: 82
LOS: 0 days | Discharge: HOME | End: 2017-09-21
Attending: EMERGENCY MEDICINE | Admitting: EMERGENCY MEDICINE
Payer: COMMERCIAL

## 2017-09-21 VITALS
OXYGEN SATURATION: 100 % | HEART RATE: 91 BPM | TEMPERATURE: 98 F | RESPIRATION RATE: 18 BRPM | SYSTOLIC BLOOD PRESSURE: 157 MMHG | DIASTOLIC BLOOD PRESSURE: 62 MMHG

## 2017-09-21 VITALS
OXYGEN SATURATION: 97 % | WEIGHT: 210.1 LBS | SYSTOLIC BLOOD PRESSURE: 158 MMHG | HEART RATE: 86 BPM | RESPIRATION RATE: 18 BRPM | HEIGHT: 68 IN | DIASTOLIC BLOOD PRESSURE: 96 MMHG | TEMPERATURE: 98 F

## 2017-09-21 DIAGNOSIS — N18.9 CHRONIC KIDNEY DISEASE, UNSPECIFIED: ICD-10-CM

## 2017-09-21 DIAGNOSIS — Z79.82 LONG TERM (CURRENT) USE OF ASPIRIN: ICD-10-CM

## 2017-09-21 DIAGNOSIS — M79.89 OTHER SPECIFIED SOFT TISSUE DISORDERS: ICD-10-CM

## 2017-09-21 DIAGNOSIS — H35.30 UNSPECIFIED MACULAR DEGENERATION: ICD-10-CM

## 2017-09-21 DIAGNOSIS — Z79.899 OTHER LONG TERM (CURRENT) DRUG THERAPY: ICD-10-CM

## 2017-09-21 DIAGNOSIS — G20 PARKINSON'S DISEASE: ICD-10-CM

## 2017-09-21 DIAGNOSIS — R60.0 LOCALIZED EDEMA: ICD-10-CM

## 2017-09-21 DIAGNOSIS — Z91.81 HISTORY OF FALLING: ICD-10-CM

## 2017-09-21 DIAGNOSIS — E11.22 TYPE 2 DIABETES MELLITUS WITH DIABETIC CHRONIC KIDNEY DISEASE: ICD-10-CM

## 2017-09-21 DIAGNOSIS — I12.9 HYPERTENSIVE CHRONIC KIDNEY DISEASE WITH STAGE 1 THROUGH STAGE 4 CHRONIC KIDNEY DISEASE, OR UNSPECIFIED CHRONIC KIDNEY DISEASE: ICD-10-CM

## 2017-09-21 PROCEDURE — 93970 EXTREMITY STUDY: CPT | Mod: 26

## 2017-09-21 PROCEDURE — 99285 EMERGENCY DEPT VISIT HI MDM: CPT

## 2017-09-21 NOTE — ED ADULT NURSE NOTE - OBJECTIVE STATEMENT
Pt sent to ED by his spouse for bilateral feet swelling. Pt has hx of Parkinson's, "sluggish" kidneys. Denies any other symptoms.

## 2017-09-21 NOTE — ED PROVIDER NOTE - OBJECTIVE STATEMENT
89 y/o M with hx of C. diff, UTI, Parkinson's and Renal failure presents to the ED c/o B/L LE Edema starting an hour ago. Pt's wife says she spoke to Nephro Dr Ferrera today and was told to bring him to the ED for evaluation. Pt recently returned home from nursing facility this past Monday. Pt currently awake, alert and denies SOB, NVD, CP, fever or any other acute c/o at this time.

## 2017-09-21 NOTE — ED PROVIDER NOTE - SKIN, MLM
Skin normal color for race, warm, dry and intact. No evidence of rash. 1+ pitting edema Left greater than right.

## 2017-09-21 NOTE — ED ADULT NURSE REASSESSMENT NOTE - NS ED NURSE REASSESS COMMENT FT1
Care transferred from Ashley Monson RN to myself. Pt comfortable in bed, c/o chronic back pain. Pt updated on plan of care and results. Denies CP/SOB. Admin orders pending

## 2017-09-21 NOTE — ED PROVIDER NOTE - MEDICAL DECISION MAKING DETAILS
patient with negative ultrasound for DVT. patient without physical exam findings for decompensated CHF. patient feels well, no SOB. appropriate for discharge home with PMD f/u.

## 2017-09-24 ENCOUNTER — EMERGENCY (EMERGENCY)
Facility: HOSPITAL | Age: 82
LOS: 0 days | Discharge: HOME | End: 2017-09-24
Attending: EMERGENCY MEDICINE | Admitting: EMERGENCY MEDICINE
Payer: COMMERCIAL

## 2017-09-24 VITALS
HEIGHT: 67 IN | DIASTOLIC BLOOD PRESSURE: 56 MMHG | SYSTOLIC BLOOD PRESSURE: 110 MMHG | HEART RATE: 68 BPM | TEMPERATURE: 99 F | WEIGHT: 199.96 LBS | RESPIRATION RATE: 14 BRPM | OXYGEN SATURATION: 100 %

## 2017-09-24 VITALS
SYSTOLIC BLOOD PRESSURE: 144 MMHG | DIASTOLIC BLOOD PRESSURE: 64 MMHG | RESPIRATION RATE: 16 BRPM | TEMPERATURE: 98 F | OXYGEN SATURATION: 100 % | HEART RATE: 72 BPM

## 2017-09-24 DIAGNOSIS — R29.6 REPEATED FALLS: ICD-10-CM

## 2017-09-24 DIAGNOSIS — R19.7 DIARRHEA, UNSPECIFIED: ICD-10-CM

## 2017-09-24 DIAGNOSIS — G20 PARKINSON'S DISEASE: ICD-10-CM

## 2017-09-24 DIAGNOSIS — Z79.899 OTHER LONG TERM (CURRENT) DRUG THERAPY: ICD-10-CM

## 2017-09-24 DIAGNOSIS — Z86.19 PERSONAL HISTORY OF OTHER INFECTIOUS AND PARASITIC DISEASES: ICD-10-CM

## 2017-09-24 DIAGNOSIS — E11.9 TYPE 2 DIABETES MELLITUS WITHOUT COMPLICATIONS: ICD-10-CM

## 2017-09-24 DIAGNOSIS — H35.30 UNSPECIFIED MACULAR DEGENERATION: ICD-10-CM

## 2017-09-24 LAB
ALBUMIN SERPL ELPH-MCNC: 3.3 G/DL — SIGNIFICANT CHANGE UP (ref 3.3–5)
ALP SERPL-CCNC: 86 U/L — SIGNIFICANT CHANGE UP (ref 40–120)
ALT FLD-CCNC: 10 U/L — LOW (ref 12–78)
ANION GAP SERPL CALC-SCNC: 7 MMOL/L — SIGNIFICANT CHANGE UP (ref 5–17)
APTT BLD: 31.7 SEC — SIGNIFICANT CHANGE UP (ref 27.5–37.4)
AST SERPL-CCNC: 16 U/L — SIGNIFICANT CHANGE UP (ref 15–37)
BASOPHILS # BLD AUTO: 0.1 K/UL — SIGNIFICANT CHANGE UP (ref 0–0.2)
BASOPHILS NFR BLD AUTO: 1.7 % — SIGNIFICANT CHANGE UP (ref 0–2)
BILIRUB SERPL-MCNC: 0.4 MG/DL — SIGNIFICANT CHANGE UP (ref 0.2–1.2)
BUN SERPL-MCNC: 30 MG/DL — HIGH (ref 7–23)
CALCIUM SERPL-MCNC: 8.7 MG/DL — SIGNIFICANT CHANGE UP (ref 8.5–10.1)
CHLORIDE SERPL-SCNC: 107 MMOL/L — SIGNIFICANT CHANGE UP (ref 96–108)
CO2 SERPL-SCNC: 28 MMOL/L — SIGNIFICANT CHANGE UP (ref 22–31)
CREAT SERPL-MCNC: 1.77 MG/DL — HIGH (ref 0.5–1.3)
EOSINOPHIL # BLD AUTO: 0.2 K/UL — SIGNIFICANT CHANGE UP (ref 0–0.5)
EOSINOPHIL NFR BLD AUTO: 2.4 % — SIGNIFICANT CHANGE UP (ref 0–6)
GLUCOSE SERPL-MCNC: 102 MG/DL — HIGH (ref 70–99)
HCT VFR BLD CALC: 35.9 % — LOW (ref 39–50)
HGB BLD-MCNC: 11.6 G/DL — LOW (ref 13–17)
LACTATE SERPL-SCNC: 1.5 MMOL/L — SIGNIFICANT CHANGE UP (ref 0.7–2)
LIDOCAIN IGE QN: 156 U/L — SIGNIFICANT CHANGE UP (ref 73–393)
LYMPHOCYTES # BLD AUTO: 1.2 K/UL — SIGNIFICANT CHANGE UP (ref 1–3.3)
LYMPHOCYTES # BLD AUTO: 17.2 % — SIGNIFICANT CHANGE UP (ref 13–44)
MCHC RBC-ENTMCNC: 27.7 PG — SIGNIFICANT CHANGE UP (ref 27–34)
MCHC RBC-ENTMCNC: 32.4 GM/DL — SIGNIFICANT CHANGE UP (ref 32–36)
MCV RBC AUTO: 85.5 FL — SIGNIFICANT CHANGE UP (ref 80–100)
MONOCYTES # BLD AUTO: 0.9 K/UL — SIGNIFICANT CHANGE UP (ref 0–0.9)
MONOCYTES NFR BLD AUTO: 12.3 % — SIGNIFICANT CHANGE UP (ref 2–14)
NEUTROPHILS # BLD AUTO: 4.6 K/UL — SIGNIFICANT CHANGE UP (ref 1.8–7.4)
NEUTROPHILS NFR BLD AUTO: 66.4 % — SIGNIFICANT CHANGE UP (ref 43–77)
PLATELET # BLD AUTO: 157 K/UL — SIGNIFICANT CHANGE UP (ref 150–400)
POTASSIUM SERPL-MCNC: 3.7 MMOL/L — SIGNIFICANT CHANGE UP (ref 3.5–5.3)
POTASSIUM SERPL-SCNC: 3.7 MMOL/L — SIGNIFICANT CHANGE UP (ref 3.5–5.3)
PROT SERPL-MCNC: 6.6 GM/DL — SIGNIFICANT CHANGE UP (ref 6–8.3)
RBC # BLD: 4.2 M/UL — SIGNIFICANT CHANGE UP (ref 4.2–5.8)
RBC # FLD: 14 % — SIGNIFICANT CHANGE UP (ref 10.3–14.5)
SODIUM SERPL-SCNC: 142 MMOL/L — SIGNIFICANT CHANGE UP (ref 135–145)
WBC # BLD: 7 K/UL — SIGNIFICANT CHANGE UP (ref 3.8–10.5)
WBC # FLD AUTO: 7 K/UL — SIGNIFICANT CHANGE UP (ref 3.8–10.5)

## 2017-09-24 PROCEDURE — 71010: CPT | Mod: 26

## 2017-09-24 PROCEDURE — 99285 EMERGENCY DEPT VISIT HI MDM: CPT

## 2017-09-24 PROCEDURE — 93010 ELECTROCARDIOGRAM REPORT: CPT

## 2017-09-24 RX ORDER — SODIUM CHLORIDE 9 MG/ML
3 INJECTION INTRAMUSCULAR; INTRAVENOUS; SUBCUTANEOUS ONCE
Qty: 0 | Refills: 0 | Status: DISCONTINUED | OUTPATIENT
Start: 2017-09-24 | End: 2017-09-24

## 2017-09-24 RX ORDER — SODIUM CHLORIDE 9 MG/ML
1000 INJECTION INTRAMUSCULAR; INTRAVENOUS; SUBCUTANEOUS ONCE
Qty: 0 | Refills: 0 | Status: COMPLETED | OUTPATIENT
Start: 2017-09-24 | End: 2017-09-24

## 2017-09-24 RX ORDER — METRONIDAZOLE 500 MG
1 TABLET ORAL
Qty: 42 | Refills: 0 | OUTPATIENT
Start: 2017-09-24 | End: 2017-10-08

## 2017-09-24 RX ORDER — METRONIDAZOLE 500 MG
500 TABLET ORAL ONCE
Qty: 0 | Refills: 0 | Status: COMPLETED | OUTPATIENT
Start: 2017-09-24 | End: 2017-09-24

## 2017-09-24 RX ADMIN — SODIUM CHLORIDE 1000 MILLILITER(S): 9 INJECTION INTRAMUSCULAR; INTRAVENOUS; SUBCUTANEOUS at 15:55

## 2017-09-24 RX ADMIN — Medication 500 MILLIGRAM(S): at 17:00

## 2017-09-24 RX ADMIN — SODIUM CHLORIDE 1000 MILLILITER(S): 9 INJECTION INTRAMUSCULAR; INTRAVENOUS; SUBCUTANEOUS at 17:15

## 2017-09-24 NOTE — ED PROVIDER NOTE - OBJECTIVE STATEMENT
88 y/o male pmhx c diff (last episode two weeks ago), PNA (admitted Aug 10) presents to ED due to diarrhea since yesterday. pt states he began to have nausea the day before yesterday. He has had diarrhea after eating since yesterday. Denies vomiting, fever. Pt recently released from rehab after 3 weeks, dx with c diff and uti. Released without abx. 88 y/o male pmhx c diff (last episode two weeks ago), PNA (admitted Aug 10) presents to ED due to diarrhea since yesterday. pt states he began to have nausea the day before yesterday. He has had diarrhea after eating since yesterday. states there has been green slime in his diarrhea. Denies vomiting, fever. Pt recently released from rehab after 3 weeks, dx with c diff and uti. Released without abx.

## 2017-09-24 NOTE — ED ADULT NURSE NOTE - ED STAT RN HANDOFF DETAILS
Pt report given to RN and care transferred. Pt discharge instructions given to spouse and pt awaiting NS bolus completion and transport home.

## 2017-09-24 NOTE — ED PROVIDER NOTE - NS_ ATTENDINGSCRIBEDETAILS _ED_A_ED_FT
I, Raoul Sanabria MD,  performed the initial face to face bedside interview with this patient regarding history of present illness, review of symptoms and relevant past medical, social and family history.  I completed an independent physical examination.    The history, relevant review of systems, past medical and surgical history, medical decision making, and physical examination was documented by the scribe in my presence and I attest to the accuracy of the documentation.

## 2017-09-24 NOTE — ED ADULT NURSE REASSESSMENT NOTE - NS ED NURSE REASSESS COMMENT FT1
Pt. D/C as ordered. Pt. states to feel much better at this time- No episodes of diarrhea noted since 1900- Wife educated by RN on phone about prescribed meds and F/U care including worsening of symptoms and when to return to the ED- Pt. also verbalizes understanding of D/C, Meds, F/U as well. EAS at the bedside for transport- No physical distress noted at this time- IVFs infused as ordered. safety maintained

## 2017-09-24 NOTE — ED ADULT NURSE NOTE - OBJECTIVE STATEMENT
pt reports approx 10 BMs in last 24 hours containing mucus and green slime, pt admits to abx x2 weeks ago for pneumonia and hx of c-diff

## 2017-09-24 NOTE — ED ADULT NURSE REASSESSMENT NOTE - NS ED NURSE REASSESS COMMENT FT1
received patient from Marisol Gardner @ 1915- Pt. is resting in bed- No acute or physical distress noted at this time- Pt. pending transport back home and wife was contacted by RN- Wife is aware that patient will be transported home tonight- Will cont to monitor patient closely- safety maintained

## 2017-09-25 LAB
C DIFF BY PCR RESULT: DETECTED
C DIFF TOX GENS STL QL NAA+PROBE: SIGNIFICANT CHANGE UP

## 2017-09-25 NOTE — ED POST DISCHARGE NOTE - RESULT SUMMARY
I spoke with patients wife and patient is on Flagyl.  She will contact PMD tomorrow.  Treatment appropriate.  CLEM Leyva PA-C

## 2017-09-27 LAB
CULTURE RESULTS: SIGNIFICANT CHANGE UP
SPECIMEN SOURCE: SIGNIFICANT CHANGE UP

## 2017-12-12 ENCOUNTER — APPOINTMENT (OUTPATIENT)
Dept: ELECTROPHYSIOLOGY | Facility: CLINIC | Age: 82
End: 2017-12-12

## 2018-03-22 NOTE — DISCHARGE NOTE ADULT - LAUNCH MEDICATION RECONCILIATION
Patient Instructions      1. Remember hydration goals - minimum of 64 ounces of liquids per day (dehydration is the number one reason for hospital readmission). 2. Continue to monitor carbohydrate and protein intake you need a minimum of  Grams of protein daily- remember to keep your total carbohydrates to 50 grams or less per day for best results. 3. Continue to work towards exercise goals - 60-90 minutes, 5 times a week minimum of deliberate, aerobic exercise is the ultimate goal with strength training 2 times each week. Refer to [facility] for  information. 4. Remember to take vitamins as directed. 5. Attend support group the 2nd Thursday of each month. 6. Use Miralax if you become constipated. 7. Call us at [phone] or email us through [email or web address]" with questions,     concerns or worsening of condition, we have someone on call 24 hours a day. If you are unable to reach our office, you are to go to your Primary Care Physician or the Emergency Department. Supplement Resource Guide    Importance of Protein:   Maintains lean body mass, produces antibodies to fight off infections, heals wounds, minimizes hair loss, helps to give you energy, helps with satiety, and keeping you full between meals. Importance of Calcium:  Needed for healthy bones and teeth, normal blood clotting, and nervous system functioning, higher risk of osteoporosis and bone disease with non-compliance. Importance of Multivitamins: Many functions. Supply you with extra nutrients that you may be missing from food. May lead to iron deficiency anemia, weakness, fatigue, and many other symptoms with non-compliance. Importance of B Vitamins:  Important for red blood cell formation, metabolism, energy, and helps to maintain a healthy nervous system. Protein Supplement  Find one you like now. Use immediately after surgery.    Look for:  35-50g protein each day from your protein supplement once you reach the progression diet. 0-3 g fat per serving  0-3 g sugar per serving    Protein drinks should be split in separate dosages. Recommend: Lifelong  1 year + Calcium Supplement:     Start taking within a month after surgery. Look for: Calcium Citrate Plus D (1500 mg per day)  Recommend: Citracal     .            Avoid chocolate chewable calcium. Can use chewable bariatric or [employer] brand or similar chewable. The body cannot absorb more than 500-600 mg of calcium at a time. Take for Life Multi-vitamin Supplement:      Start immediately after surgery: any complete chewable, such as: [place]s Complete chewables. Avoid [place] sours or gummies. They lack iron and other important nutrients and also have added sugar. Continue with chewable vitamin or change to adult complete multivitamin one month after surgery. Menstruating women can take a prenatal vitamin. Make sure has at least 18 mg iron and [id number] mcg folic acid   Vitamin T01, B Complex Vitamin, and Biotin  Start taking within a month after surgery. Vitamin B12:  1000 mcg of Vitamin B12 three times weekly    Must take sublingually (meaning you take it under your tongue) or in a liquid drop form for easy absorption. B Complex Vitamin: Take a pill or liquid drop form once daily. Biotin: This vitamin can help prevent hair loss. Recommend 5mg   (5000 mcg) a day  Biotin is Optional            Walking for Exercise: Care Instructions  Your Care Instructions    Walking is one of the easiest ways to get the exercise you need for good health. A brisk, 30-minute walk each day can help you feel better and have more energy. It can help you lower your risk of disease. Walking can help you keep your bones strong and your heart healthy.   Check with your doctor before you start a walking plan if you have heart problems, other health issues, or you have not been active in a long time. Follow your doctor's instructions for safe levels of exercise. Follow-up care is a key part of your treatment and safety. Be sure to make and go to all appointments, and call your doctor if you are having problems. It's also a good idea to know your test results and keep a list of the medicines you take. How can you care for yourself at home? Getting started  · Start slowly and set a short-term goal. For example, walk for 5 or 10 minutes every day. · Bit by bit, increase the amount you walk every day. Try for at least 30 minutes on most days of the week. You also may want to swim, bike, or do other activities. · If finding enough time is a problem, it is fine to be active in blocks of 10 minutes or more throughout your day and week. · To get the heart-healthy benefits of walking, you need to walk briskly enough to increase your heart rate and breathing, but not so fast that you cannot talk comfortably. · Wear comfortable shoes that fit well and provide good support for your feet and ankles. Staying with your plan  · After you've made walking a habit, set a longer-term goal. You may want to set a goal of walking briskly for longer or walking farther. Experts say to do 2½ hours of moderate activity a week. A faster heartbeat is what defines moderate-level activity. · To stay motivated, walk with friends, coworkers, or pets. · Use a phone ayana or pedometer to track your steps each day. Set a goal to increase your steps. Once you get there, set a higher goal. Aim for 10,000 steps a day. · If the weather keeps you from walking outside, go for walks at the mall with a friend. Local schools and churches may have indoor gyms where you can walk. Fitting a walk into your workday  · Park several blocks away from work, or get off the bus a few stops early. · Use the stairs instead of the elevator, at least for a few floors.   · Suggest holding meetings with colleagues during a walk inside or outside the building. · Use the restroom that is the farthest from your desk or workstation. · Use your morning and afternoon breaks to take quick 15-minute walks. Staying safe  · Know your surroundings. Walk in a well-lighted, safe place. If it is dark, walk with a partner. Wear light-colored clothing. If you can, buy a vest or jacket that reflects light. · Carry a cell phone for emergencies. · Drink plenty of water. Take a water bottle with you when you walk. This is very important if it is hot out. · Be careful not to slip on wet or icy ground. You can buy \"grippers\" for your shoes to help keep you from slipping. · Pay attention to your walking surface. Use sidewalks and paths. · If you have breathing problems like asthma or COPD, ask your doctor when it is safe for you to walk outdoors. Cold, dry air, smog, pollen, or other things in the air could cause breathing problems. Where can you learn more? Go to http://cristina-melodie.info/. Enter R159 in the search box to learn more about \"Walking for Exercise: Care Instructions. \"  Current as of: March 13, 2017  Content Version: 11.4  © 2256-6540 [employer], [employer]. Care instructions adapted under license by [employer] (which disclaims liability or warranty for this information). If you have questions about a medical condition or this instruction, always ask your healthcare professional. [place] [address] any warranty or liability for your use of this information. <<-----Click here for Discharge Medication Review

## 2018-05-19 ENCOUNTER — EMERGENCY (EMERGENCY)
Facility: HOSPITAL | Age: 83
LOS: 0 days | Discharge: HOME | End: 2018-05-19
Attending: EMERGENCY MEDICINE | Admitting: EMERGENCY MEDICINE
Payer: COMMERCIAL

## 2018-05-19 VITALS — SYSTOLIC BLOOD PRESSURE: 128 MMHG | HEART RATE: 78 BPM | OXYGEN SATURATION: 96 % | DIASTOLIC BLOOD PRESSURE: 48 MMHG

## 2018-05-19 VITALS
SYSTOLIC BLOOD PRESSURE: 141 MMHG | HEART RATE: 88 BPM | WEIGHT: 220.02 LBS | DIASTOLIC BLOOD PRESSURE: 68 MMHG | TEMPERATURE: 98 F | HEIGHT: 67 IN | RESPIRATION RATE: 18 BRPM

## 2018-05-19 DIAGNOSIS — R53.1 WEAKNESS: ICD-10-CM

## 2018-05-19 DIAGNOSIS — N18.9 CHRONIC KIDNEY DISEASE, UNSPECIFIED: ICD-10-CM

## 2018-05-19 DIAGNOSIS — E11.22 TYPE 2 DIABETES MELLITUS WITH DIABETIC CHRONIC KIDNEY DISEASE: ICD-10-CM

## 2018-05-19 DIAGNOSIS — E11.9 TYPE 2 DIABETES MELLITUS WITHOUT COMPLICATIONS: ICD-10-CM

## 2018-05-19 DIAGNOSIS — G20 PARKINSON'S DISEASE: ICD-10-CM

## 2018-05-19 DIAGNOSIS — H35.30 UNSPECIFIED MACULAR DEGENERATION: ICD-10-CM

## 2018-05-19 DIAGNOSIS — Z09 ENCOUNTER FOR FOLLOW-UP EXAMINATION AFTER COMPLETED TREATMENT FOR CONDITIONS OTHER THAN MALIGNANT NEOPLASM: ICD-10-CM

## 2018-05-19 DIAGNOSIS — B02.9 ZOSTER WITHOUT COMPLICATIONS: ICD-10-CM

## 2018-05-19 DIAGNOSIS — Z79.899 OTHER LONG TERM (CURRENT) DRUG THERAPY: ICD-10-CM

## 2018-05-19 DIAGNOSIS — I12.9 HYPERTENSIVE CHRONIC KIDNEY DISEASE WITH STAGE 1 THROUGH STAGE 4 CHRONIC KIDNEY DISEASE, OR UNSPECIFIED CHRONIC KIDNEY DISEASE: ICD-10-CM

## 2018-05-19 LAB
ALBUMIN SERPL ELPH-MCNC: 3.8 G/DL — SIGNIFICANT CHANGE UP (ref 3.3–5)
ALP SERPL-CCNC: 84 U/L — SIGNIFICANT CHANGE UP (ref 40–120)
ALT FLD-CCNC: 22 U/L — SIGNIFICANT CHANGE UP (ref 12–78)
ANION GAP SERPL CALC-SCNC: 10 MMOL/L — SIGNIFICANT CHANGE UP (ref 5–17)
ANISOCYTOSIS BLD QL: SLIGHT — SIGNIFICANT CHANGE UP
AST SERPL-CCNC: 17 U/L — SIGNIFICANT CHANGE UP (ref 15–37)
BASOPHILS # BLD AUTO: 0 K/UL — SIGNIFICANT CHANGE UP (ref 0–0.2)
BASOPHILS NFR BLD AUTO: 0 % — SIGNIFICANT CHANGE UP (ref 0–2)
BILIRUB SERPL-MCNC: 0.6 MG/DL — SIGNIFICANT CHANGE UP (ref 0.2–1.2)
BUN SERPL-MCNC: 34 MG/DL — HIGH (ref 7–23)
CALCIUM SERPL-MCNC: 9.3 MG/DL — SIGNIFICANT CHANGE UP (ref 8.5–10.1)
CHLORIDE SERPL-SCNC: 106 MMOL/L — SIGNIFICANT CHANGE UP (ref 96–108)
CO2 SERPL-SCNC: 24 MMOL/L — SIGNIFICANT CHANGE UP (ref 22–31)
CREAT SERPL-MCNC: 1.72 MG/DL — HIGH (ref 0.5–1.3)
EOSINOPHIL # BLD AUTO: 0 K/UL — SIGNIFICANT CHANGE UP (ref 0–0.5)
EOSINOPHIL NFR BLD AUTO: 0 % — SIGNIFICANT CHANGE UP (ref 0–6)
GLUCOSE SERPL-MCNC: 124 MG/DL — HIGH (ref 70–99)
HCT VFR BLD CALC: 44.2 % — SIGNIFICANT CHANGE UP (ref 39–50)
HGB BLD-MCNC: 15 G/DL — SIGNIFICANT CHANGE UP (ref 13–17)
LYMPHOCYTES # BLD AUTO: 0.63 K/UL — LOW (ref 1–3.3)
LYMPHOCYTES # BLD AUTO: 12 % — LOW (ref 13–44)
MANUAL SMEAR VERIFICATION: SIGNIFICANT CHANGE UP
MCHC RBC-ENTMCNC: 29.5 PG — SIGNIFICANT CHANGE UP (ref 27–34)
MCHC RBC-ENTMCNC: 33.9 GM/DL — SIGNIFICANT CHANGE UP (ref 32–36)
MCV RBC AUTO: 87 FL — SIGNIFICANT CHANGE UP (ref 80–100)
MONOCYTES # BLD AUTO: 0.26 K/UL — SIGNIFICANT CHANGE UP (ref 0–0.9)
MONOCYTES NFR BLD AUTO: 5 % — SIGNIFICANT CHANGE UP (ref 2–14)
NEUTROPHILS # BLD AUTO: 4.37 K/UL — SIGNIFICANT CHANGE UP (ref 1.8–7.4)
NEUTROPHILS NFR BLD AUTO: 83 % — HIGH (ref 43–77)
NRBC # BLD: 0 /100 — SIGNIFICANT CHANGE UP (ref 0–0)
NRBC # BLD: SIGNIFICANT CHANGE UP /100 WBCS (ref 0–0)
PLAT MORPH BLD: NORMAL — SIGNIFICANT CHANGE UP
PLATELET # BLD AUTO: 117 K/UL — LOW (ref 150–400)
POIKILOCYTOSIS BLD QL AUTO: SLIGHT — SIGNIFICANT CHANGE UP
POTASSIUM SERPL-MCNC: 4 MMOL/L — SIGNIFICANT CHANGE UP (ref 3.5–5.3)
POTASSIUM SERPL-SCNC: 4 MMOL/L — SIGNIFICANT CHANGE UP (ref 3.5–5.3)
PROT SERPL-MCNC: 7.5 GM/DL — SIGNIFICANT CHANGE UP (ref 6–8.3)
RBC # BLD: 5.08 M/UL — SIGNIFICANT CHANGE UP (ref 4.2–5.8)
RBC # FLD: 14.6 % — HIGH (ref 10.3–14.5)
RBC BLD AUTO: SIGNIFICANT CHANGE UP
SODIUM SERPL-SCNC: 140 MMOL/L — SIGNIFICANT CHANGE UP (ref 135–145)
WBC # BLD: 5.27 K/UL — SIGNIFICANT CHANGE UP (ref 3.8–10.5)
WBC # FLD AUTO: 5.27 K/UL — SIGNIFICANT CHANGE UP (ref 3.8–10.5)

## 2018-05-19 PROCEDURE — 99284 EMERGENCY DEPT VISIT MOD MDM: CPT

## 2018-05-19 RX ORDER — ACETAMINOPHEN 500 MG
650 TABLET ORAL ONCE
Qty: 0 | Refills: 0 | Status: COMPLETED | OUTPATIENT
Start: 2018-05-19 | End: 2018-05-19

## 2018-05-19 RX ORDER — CLOPIDOGREL BISULFATE 75 MG/1
75 TABLET, FILM COATED ORAL DAILY
Qty: 0 | Refills: 0 | Status: DISCONTINUED | OUTPATIENT
Start: 2018-05-19 | End: 2018-05-19

## 2018-05-19 RX ORDER — VALACYCLOVIR 500 MG/1
1 TABLET, FILM COATED ORAL
Qty: 21 | Refills: 0 | OUTPATIENT
Start: 2018-05-19 | End: 2018-05-25

## 2018-05-19 RX ORDER — AMLODIPINE BESYLATE 2.5 MG/1
5 TABLET ORAL ONCE
Qty: 0 | Refills: 0 | Status: COMPLETED | OUTPATIENT
Start: 2018-05-19 | End: 2018-05-19

## 2018-05-19 RX ORDER — VALACYCLOVIR 500 MG/1
1000 TABLET, FILM COATED ORAL ONCE
Qty: 0 | Refills: 0 | Status: COMPLETED | OUTPATIENT
Start: 2018-05-19 | End: 2018-05-19

## 2018-05-19 RX ORDER — CIPROFLOXACIN AND DEXAMETHASONE 3; 1 MG/ML; MG/ML
4 SUSPENSION/ DROPS AURICULAR (OTIC) ONCE
Qty: 0 | Refills: 0 | Status: COMPLETED | OUTPATIENT
Start: 2018-05-19 | End: 2018-05-19

## 2018-05-19 RX ORDER — CARBIDOPA AND LEVODOPA 25; 100 MG/1; MG/1
1 TABLET ORAL ONCE
Qty: 0 | Refills: 0 | Status: COMPLETED | OUTPATIENT
Start: 2018-05-19 | End: 2018-05-19

## 2018-05-19 RX ADMIN — CARBIDOPA AND LEVODOPA 1 TABLET(S): 25; 100 TABLET ORAL at 12:29

## 2018-05-19 RX ADMIN — CLOPIDOGREL BISULFATE 75 MILLIGRAM(S): 75 TABLET, FILM COATED ORAL at 12:29

## 2018-05-19 RX ADMIN — CIPROFLOXACIN AND DEXAMETHASONE 4 DROP(S): 3; 1 SUSPENSION/ DROPS AURICULAR (OTIC) at 08:38

## 2018-05-19 RX ADMIN — AMLODIPINE BESYLATE 5 MILLIGRAM(S): 2.5 TABLET ORAL at 12:34

## 2018-05-19 RX ADMIN — Medication 60 MILLIGRAM(S): at 08:38

## 2018-05-19 RX ADMIN — Medication 650 MILLIGRAM(S): at 10:54

## 2018-05-19 RX ADMIN — Medication 650 MILLIGRAM(S): at 12:28

## 2018-05-19 RX ADMIN — VALACYCLOVIR 1000 MILLIGRAM(S): 500 TABLET, FILM COATED ORAL at 08:54

## 2018-05-19 RX ADMIN — Medication 650 MILLIGRAM(S): at 08:38

## 2018-05-19 NOTE — ED ADULT TRIAGE NOTE - CHIEF COMPLAINT QUOTE
patient woke up this morning weaker than usual, unable to get oob,  wife noted bleeding from left ear and feels face is more swollen

## 2018-05-19 NOTE — ED PROVIDER NOTE - ENMT, MLM
Airway patent, Nasal mucosa clear. Mouth with normal mucosa. Throat has no vesicles, no oropharyngeal exudates and uvula is midline.  Bleeding from ear canal with visible lesions.

## 2018-05-19 NOTE — ED PROVIDER NOTE - MEDICAL DECISION MAKING DETAILS
89 y/o male presents with vesicular lesions and generalized weakness, plan for Valtrex, prednisone and Tylenol for pain.

## 2018-07-16 PROBLEM — E11.9 TYPE 2 DIABETES MELLITUS WITHOUT COMPLICATIONS: Chronic | Status: ACTIVE | Noted: 2017-01-28

## 2018-08-23 ENCOUNTER — INPATIENT (INPATIENT)
Facility: HOSPITAL | Age: 83
LOS: 4 days | Discharge: HOME HEALTH | End: 2018-08-28
Attending: INTERNAL MEDICINE | Admitting: INTERNAL MEDICINE
Payer: MEDICARE

## 2018-08-23 VITALS
HEIGHT: 70 IN | WEIGHT: 199.96 LBS | HEART RATE: 94 BPM | DIASTOLIC BLOOD PRESSURE: 70 MMHG | SYSTOLIC BLOOD PRESSURE: 135 MMHG | OXYGEN SATURATION: 99 % | TEMPERATURE: 97 F | RESPIRATION RATE: 18 BRPM

## 2018-08-23 LAB
ALBUMIN SERPL ELPH-MCNC: 3.6 G/DL — SIGNIFICANT CHANGE UP (ref 3.3–5)
ALP SERPL-CCNC: 80 U/L — SIGNIFICANT CHANGE UP (ref 40–120)
ALT FLD-CCNC: 11 U/L — LOW (ref 12–78)
ANION GAP SERPL CALC-SCNC: 9 MMOL/L — SIGNIFICANT CHANGE UP (ref 5–17)
APTT BLD: 30.7 SEC — SIGNIFICANT CHANGE UP (ref 27.5–37.4)
AST SERPL-CCNC: 21 U/L — SIGNIFICANT CHANGE UP (ref 15–37)
BASOPHILS # BLD AUTO: 0.07 K/UL — SIGNIFICANT CHANGE UP (ref 0–0.2)
BASOPHILS NFR BLD AUTO: 0.8 % — SIGNIFICANT CHANGE UP (ref 0–2)
BILIRUB SERPL-MCNC: 0.3 MG/DL — SIGNIFICANT CHANGE UP (ref 0.2–1.2)
BUN SERPL-MCNC: 40 MG/DL — HIGH (ref 7–23)
CALCIUM SERPL-MCNC: 9.2 MG/DL — SIGNIFICANT CHANGE UP (ref 8.5–10.1)
CHLORIDE SERPL-SCNC: 107 MMOL/L — SIGNIFICANT CHANGE UP (ref 96–108)
CK SERPL-CCNC: 105 U/L — SIGNIFICANT CHANGE UP (ref 26–308)
CO2 SERPL-SCNC: 24 MMOL/L — SIGNIFICANT CHANGE UP (ref 22–31)
CREAT SERPL-MCNC: 1.81 MG/DL — HIGH (ref 0.5–1.3)
EOSINOPHIL # BLD AUTO: 0.32 K/UL — SIGNIFICANT CHANGE UP (ref 0–0.5)
EOSINOPHIL NFR BLD AUTO: 3.8 % — SIGNIFICANT CHANGE UP (ref 0–6)
GLUCOSE SERPL-MCNC: 127 MG/DL — HIGH (ref 70–99)
HCT VFR BLD CALC: 40.6 % — SIGNIFICANT CHANGE UP (ref 39–50)
HGB BLD-MCNC: 13.8 G/DL — SIGNIFICANT CHANGE UP (ref 13–17)
IMM GRANULOCYTES NFR BLD AUTO: 0.6 % — SIGNIFICANT CHANGE UP (ref 0–1.5)
INR BLD: 1.05 RATIO — SIGNIFICANT CHANGE UP (ref 0.88–1.16)
LYMPHOCYTES # BLD AUTO: 1.13 K/UL — SIGNIFICANT CHANGE UP (ref 1–3.3)
LYMPHOCYTES # BLD AUTO: 13.3 % — SIGNIFICANT CHANGE UP (ref 13–44)
MCHC RBC-ENTMCNC: 30.7 PG — SIGNIFICANT CHANGE UP (ref 27–34)
MCHC RBC-ENTMCNC: 34 GM/DL — SIGNIFICANT CHANGE UP (ref 32–36)
MCV RBC AUTO: 90.2 FL — SIGNIFICANT CHANGE UP (ref 80–100)
MONOCYTES # BLD AUTO: 0.79 K/UL — SIGNIFICANT CHANGE UP (ref 0–0.9)
MONOCYTES NFR BLD AUTO: 9.3 % — SIGNIFICANT CHANGE UP (ref 2–14)
NEUTROPHILS # BLD AUTO: 6.11 K/UL — SIGNIFICANT CHANGE UP (ref 1.8–7.4)
NEUTROPHILS NFR BLD AUTO: 72.2 % — SIGNIFICANT CHANGE UP (ref 43–77)
PLATELET # BLD AUTO: 176 K/UL — SIGNIFICANT CHANGE UP (ref 150–400)
POTASSIUM SERPL-MCNC: 4.3 MMOL/L — SIGNIFICANT CHANGE UP (ref 3.5–5.3)
POTASSIUM SERPL-SCNC: 4.3 MMOL/L — SIGNIFICANT CHANGE UP (ref 3.5–5.3)
PROT SERPL-MCNC: 7.2 GM/DL — SIGNIFICANT CHANGE UP (ref 6–8.3)
PROTHROM AB SERPL-ACNC: 11.4 SEC — SIGNIFICANT CHANGE UP (ref 9.8–12.7)
RBC # BLD: 4.5 M/UL — SIGNIFICANT CHANGE UP (ref 4.2–5.8)
RBC # FLD: 13.7 % — SIGNIFICANT CHANGE UP (ref 10.3–14.5)
SODIUM SERPL-SCNC: 140 MMOL/L — SIGNIFICANT CHANGE UP (ref 135–145)
TROPONIN I SERPL-MCNC: <0.015 NG/ML — SIGNIFICANT CHANGE UP (ref 0.01–0.04)
WBC # BLD: 8.47 K/UL — SIGNIFICANT CHANGE UP (ref 3.8–10.5)
WBC # FLD AUTO: 8.47 K/UL — SIGNIFICANT CHANGE UP (ref 3.8–10.5)

## 2018-08-23 PROCEDURE — 71046 X-RAY EXAM CHEST 2 VIEWS: CPT | Mod: 26

## 2018-08-23 PROCEDURE — 93010 ELECTROCARDIOGRAM REPORT: CPT

## 2018-08-23 RX ORDER — ASPIRIN/CALCIUM CARB/MAGNESIUM 324 MG
325 TABLET ORAL ONCE
Qty: 0 | Refills: 0 | Status: COMPLETED | OUTPATIENT
Start: 2018-08-23 | End: 2018-08-23

## 2018-08-23 RX ORDER — SODIUM CHLORIDE 9 MG/ML
3 INJECTION INTRAMUSCULAR; INTRAVENOUS; SUBCUTANEOUS ONCE
Qty: 0 | Refills: 0 | Status: COMPLETED | OUTPATIENT
Start: 2018-08-23 | End: 2018-08-23

## 2018-08-23 RX ADMIN — SODIUM CHLORIDE 3 MILLILITER(S): 9 INJECTION INTRAMUSCULAR; INTRAVENOUS; SUBCUTANEOUS at 21:49

## 2018-08-23 RX ADMIN — Medication 325 MILLIGRAM(S): at 21:49

## 2018-08-23 NOTE — ED PROVIDER NOTE - NS_ ATTENDINGSCRIBEDETAILS _ED_A_ED_FT
I, Robert Jiang MD,  performed the initial face to face bedside interview with this patient regarding history of present illness, review of symptoms and relevant past medical, social and family history.  I completed an independent physical examination.  I was the initial provider who evaluated this patient.  The history, relevant review of systems, past medical and surgical history, medical decision making, and physical examination was documented by the scribe in my presence and I attest to the accuracy of the documentation.

## 2018-08-23 NOTE — ED ADULT NURSE NOTE - OBJECTIVE STATEMENT
Pt presents to ER c/o CP, dizziness, headache and nausea. Pt reports onset of symptoms began this morning with gradual onset. Denies any episodes of vomiting or diarrhea. Denies radiating pain/change in vision. Neuro intact. Hx of Parkinson's, gait unstable at baseline. AO x 3 oriented to baseline, normal breathing pattern with no difficulty

## 2018-08-23 NOTE — ED PROVIDER NOTE - MEDICAL DECISION MAKING DETAILS
89 y/o male with hx of HTN CAD s/p 2 stents presents to the ED with CP across entire chest with nausea and worsening shacking. Denies fever, cough vomiting. Nothing helps/ worsens sx. On exam: 2+LE pitting edema. Concern for ACS vs. CHF vs. pe vs. PNA. Will get labs, CXR, EKG and admit. 89 y/o male with hx of HTN CAD s/p 2 stents presents to the ED with CP across entire chest with nausea and worsening shaking. Denies fever, cough vomiting. Nothing helps/ worsens sx. On exam: 2+LE pitting edema. Concern for ACS vs. CHF vs. pe vs. PNA. Will get labs, CXR, EKG and admit.

## 2018-08-23 NOTE — ED ADULT NURSE NOTE - NSIMPLEMENTINTERV_GEN_ALL_ED
Implemented All Fall Risk Interventions:  [place] to call system. Call bell, personal items and telephone within reach. Instruct patient to call for assistance. Room bathroom lighting operational. Non-slip footwear when patient is off stretcher. Physically safe environment: no spills, clutter or unnecessary equipment. Stretcher in lowest position, wheels locked, appropriate side rails in place. Provide visual cue, wrist band, yellow gown, etc. Monitor gait and stability. Monitor for mental status changes and reorient to person, place, and time. Review medications for side effects contributing to fall risk. Reinforce activity limits and safety measures with patient and family.

## 2018-08-23 NOTE — ED ADULT TRIAGE NOTE - PAIN RATING/NUMBER SCALE (0-10): ACTIVITY
Increase fluid intake, avoid heavy, fatty  foods for the next 12-24 hours, you may resume a regular diet tomorrow.  Progress slowly 0

## 2018-08-23 NOTE — ED ADULT TRIAGE NOTE - CHIEF COMPLAINT QUOTE
Patient comes to  ED for "hollowness in chest" pt denies pain or pressure. symptoms began today. pt wife gave 1 nitro at home. pt has a hx of Afib

## 2018-08-23 NOTE — ED PROVIDER NOTE - PHYSICAL EXAMINATION
Constitutional: mild distress AAOx3  Eyes: PERRLA EOMI  Head: Normocephalic atraumatic  Mouth: MMM  Cardiac: regular rate. 2+pitting edema B/L LE  Resp: Lungs CTAB  GI: Abd s/nt/nd  Neuro: CN2-12 intact  Skin: No rashes Constitutional: mild distress   Eyes: PERRLA EOMI  Head: Normocephalic atraumatic  Mouth: MMM  Cardiac: regular rate. 2+pitting edema B/L LE  Resp: Lungs CTAB  GI: Abd s/nt/nd  Neuro: CN2-12 intact  Skin: No rashes

## 2018-08-23 NOTE — ED PROVIDER NOTE - PROGRESS NOTE DETAILS
pt resting comfortably. pt endorsed to Dr. Bonds for cardiac eval who accepts pt. Robert Jiang M.D., Attending Physician

## 2018-08-23 NOTE — ED PROVIDER NOTE - NS ED ROS FT
Constitutional: No fever or chills  Eyes: No visual changes  HEENT: No throat pain  CV: +chest pain +dizziness  Resp: No cough. +SOB   GI: No abd pain, or vomiting. +Nausea.   : No dysuria  MSK: No musculoskeletal pain  Skin: No rash  Neuro: +HA

## 2018-08-23 NOTE — ED PROVIDER NOTE - OBJECTIVE STATEMENT
89 y/o male with PMHx of HTN, CAD s/p 2 stents and pacemaker, Macular degeneration, Parkinson's presents to the ED c/o CP with dizziness, HA, SOB, nausea. SOB is not worse when laying flat. Pt with impacted bowel yesterday, cleared last night. Denies abd pain, dysuria or other sx at this time. Last cardiac workup 1 year ago. PCP- Dr. Cano

## 2018-08-24 DIAGNOSIS — E78.5 HYPERLIPIDEMIA, UNSPECIFIED: ICD-10-CM

## 2018-08-24 DIAGNOSIS — E11.9 TYPE 2 DIABETES MELLITUS WITHOUT COMPLICATIONS: ICD-10-CM

## 2018-08-24 DIAGNOSIS — R07.9 CHEST PAIN, UNSPECIFIED: ICD-10-CM

## 2018-08-24 DIAGNOSIS — M10.9 GOUT, UNSPECIFIED: ICD-10-CM

## 2018-08-24 DIAGNOSIS — Z98.61 CORONARY ANGIOPLASTY STATUS: Chronic | ICD-10-CM

## 2018-08-24 DIAGNOSIS — G20 PARKINSON'S DISEASE: ICD-10-CM

## 2018-08-24 DIAGNOSIS — I10 ESSENTIAL (PRIMARY) HYPERTENSION: ICD-10-CM

## 2018-08-24 DIAGNOSIS — Z95.0 PRESENCE OF CARDIAC PACEMAKER: Chronic | ICD-10-CM

## 2018-08-24 DIAGNOSIS — Z29.9 ENCOUNTER FOR PROPHYLACTIC MEASURES, UNSPECIFIED: ICD-10-CM

## 2018-08-24 PROBLEM — H35.30 UNSPECIFIED MACULAR DEGENERATION: Chronic | Status: ACTIVE | Noted: 2017-01-28
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
## 2018-08-24 PROBLEM — N19 UNSPECIFIED KIDNEY FAILURE: Chronic | Status: INACTIVE | Noted: 2017-01-28 | Resolved: 2018-08-24

## 2018-08-24 LAB
ALBUMIN SERPL ELPH-MCNC: 3.2 G/DL — LOW (ref 3.3–5)
ALP SERPL-CCNC: 69 U/L — SIGNIFICANT CHANGE UP (ref 40–120)
ALT FLD-CCNC: 16 U/L — SIGNIFICANT CHANGE UP (ref 12–78)
ANION GAP SERPL CALC-SCNC: 6 MMOL/L — SIGNIFICANT CHANGE UP (ref 5–17)
AST SERPL-CCNC: 18 U/L — SIGNIFICANT CHANGE UP (ref 15–37)
BILIRUB SERPL-MCNC: 0.4 MG/DL — SIGNIFICANT CHANGE UP (ref 0.2–1.2)
BUN SERPL-MCNC: 35 MG/DL — HIGH (ref 7–23)
CALCIUM SERPL-MCNC: 9.1 MG/DL — SIGNIFICANT CHANGE UP (ref 8.5–10.1)
CHLORIDE SERPL-SCNC: 110 MMOL/L — HIGH (ref 96–108)
CO2 SERPL-SCNC: 23 MMOL/L — SIGNIFICANT CHANGE UP (ref 22–31)
CREAT SERPL-MCNC: 1.61 MG/DL — HIGH (ref 0.5–1.3)
GLUCOSE SERPL-MCNC: 130 MG/DL — HIGH (ref 70–99)
HCT VFR BLD CALC: 38.9 % — LOW (ref 39–50)
HGB BLD-MCNC: 13.1 G/DL — SIGNIFICANT CHANGE UP (ref 13–17)
MAGNESIUM SERPL-MCNC: 2.2 MG/DL — SIGNIFICANT CHANGE UP (ref 1.6–2.6)
MCHC RBC-ENTMCNC: 30.6 PG — SIGNIFICANT CHANGE UP (ref 27–34)
MCHC RBC-ENTMCNC: 33.7 GM/DL — SIGNIFICANT CHANGE UP (ref 32–36)
MCV RBC AUTO: 90.9 FL — SIGNIFICANT CHANGE UP (ref 80–100)
NRBC # BLD: 0 /100 WBCS — SIGNIFICANT CHANGE UP (ref 0–0)
PLATELET # BLD AUTO: 177 K/UL — SIGNIFICANT CHANGE UP (ref 150–400)
POTASSIUM SERPL-MCNC: 4 MMOL/L — SIGNIFICANT CHANGE UP (ref 3.5–5.3)
POTASSIUM SERPL-SCNC: 4 MMOL/L — SIGNIFICANT CHANGE UP (ref 3.5–5.3)
PROT SERPL-MCNC: 6.8 GM/DL — SIGNIFICANT CHANGE UP (ref 6–8.3)
RBC # BLD: 4.28 M/UL — SIGNIFICANT CHANGE UP (ref 4.2–5.8)
RBC # FLD: 13.7 % — SIGNIFICANT CHANGE UP (ref 10.3–14.5)
SODIUM SERPL-SCNC: 139 MMOL/L — SIGNIFICANT CHANGE UP (ref 135–145)
TROPONIN I SERPL-MCNC: <0.015 NG/ML — SIGNIFICANT CHANGE UP (ref 0.01–0.04)
TROPONIN I SERPL-MCNC: <0.015 NG/ML — SIGNIFICANT CHANGE UP (ref 0.01–0.04)
WBC # BLD: 8.57 K/UL — SIGNIFICANT CHANGE UP (ref 3.8–10.5)
WBC # FLD AUTO: 8.57 K/UL — SIGNIFICANT CHANGE UP (ref 3.8–10.5)

## 2018-08-24 PROCEDURE — 93280 PM DEVICE PROGR EVAL DUAL: CPT | Mod: 26

## 2018-08-24 PROCEDURE — 78582 LUNG VENTILAT&PERFUS IMAGING: CPT | Mod: 26

## 2018-08-24 PROCEDURE — 99285 EMERGENCY DEPT VISIT HI MDM: CPT

## 2018-08-24 RX ORDER — ACETAMINOPHEN 500 MG
650 TABLET ORAL EVERY 6 HOURS
Qty: 0 | Refills: 0 | Status: DISCONTINUED | OUTPATIENT
Start: 2018-08-24 | End: 2018-08-28

## 2018-08-24 RX ORDER — ATORVASTATIN CALCIUM 80 MG/1
10 TABLET, FILM COATED ORAL AT BEDTIME
Qty: 0 | Refills: 0 | Status: DISCONTINUED | OUTPATIENT
Start: 2018-08-24 | End: 2018-08-28

## 2018-08-24 RX ORDER — CLOPIDOGREL BISULFATE 75 MG/1
75 TABLET, FILM COATED ORAL DAILY
Qty: 0 | Refills: 0 | Status: DISCONTINUED | OUTPATIENT
Start: 2018-08-24 | End: 2018-08-28

## 2018-08-24 RX ORDER — HEPARIN SODIUM 5000 [USP'U]/ML
5000 INJECTION INTRAVENOUS; SUBCUTANEOUS EVERY 8 HOURS
Qty: 0 | Refills: 0 | Status: DISCONTINUED | OUTPATIENT
Start: 2018-08-24 | End: 2018-08-28

## 2018-08-24 RX ORDER — SENNA PLUS 8.6 MG/1
2 TABLET ORAL AT BEDTIME
Qty: 0 | Refills: 0 | Status: DISCONTINUED | OUTPATIENT
Start: 2018-08-24 | End: 2018-08-28

## 2018-08-24 RX ORDER — ONDANSETRON 8 MG/1
4 TABLET, FILM COATED ORAL EVERY 6 HOURS
Qty: 0 | Refills: 0 | Status: DISCONTINUED | OUTPATIENT
Start: 2018-08-24 | End: 2018-08-28

## 2018-08-24 RX ORDER — PREGABALIN 225 MG/1
2 CAPSULE ORAL
Qty: 0 | Refills: 0 | COMMUNITY

## 2018-08-24 RX ORDER — AMLODIPINE BESYLATE 2.5 MG/1
10 TABLET ORAL DAILY
Qty: 0 | Refills: 0 | Status: DISCONTINUED | OUTPATIENT
Start: 2018-08-24 | End: 2018-08-28

## 2018-08-24 RX ORDER — CARBIDOPA AND LEVODOPA 25; 100 MG/1; MG/1
1 TABLET ORAL THREE TIMES A DAY
Qty: 0 | Refills: 0 | Status: DISCONTINUED | OUTPATIENT
Start: 2018-08-24 | End: 2018-08-28

## 2018-08-24 RX ORDER — CARBIDOPA AND LEVODOPA 25; 100 MG/1; MG/1
1 TABLET ORAL
Qty: 0 | Refills: 0 | COMMUNITY

## 2018-08-24 RX ORDER — MESALAMINE 400 MG
1 TABLET, DELAYED RELEASE (ENTERIC COATED) ORAL
Qty: 0 | Refills: 0 | COMMUNITY

## 2018-08-24 RX ORDER — DOCUSATE SODIUM 100 MG
100 CAPSULE ORAL THREE TIMES A DAY
Qty: 0 | Refills: 0 | Status: DISCONTINUED | OUTPATIENT
Start: 2018-08-24 | End: 2018-08-28

## 2018-08-24 RX ORDER — METOPROLOL TARTRATE 50 MG
100 TABLET ORAL DAILY
Qty: 0 | Refills: 0 | Status: DISCONTINUED | OUTPATIENT
Start: 2018-08-24 | End: 2018-08-28

## 2018-08-24 RX ORDER — ASPIRIN/CALCIUM CARB/MAGNESIUM 324 MG
81 TABLET ORAL DAILY
Qty: 0 | Refills: 0 | Status: DISCONTINUED | OUTPATIENT
Start: 2018-08-24 | End: 2018-08-28

## 2018-08-24 RX ORDER — ASCORBIC ACID 60 MG
1 TABLET,CHEWABLE ORAL
Qty: 0 | Refills: 0 | COMMUNITY

## 2018-08-24 RX ORDER — CHOLECALCIFEROL (VITAMIN D3) 125 MCG
1 CAPSULE ORAL
Qty: 0 | Refills: 0 | COMMUNITY

## 2018-08-24 RX ADMIN — Medication 650 MILLIGRAM(S): at 07:53

## 2018-08-24 RX ADMIN — HEPARIN SODIUM 5000 UNIT(S): 5000 INJECTION INTRAVENOUS; SUBCUTANEOUS at 21:27

## 2018-08-24 RX ADMIN — Medication 81 MILLIGRAM(S): at 12:01

## 2018-08-24 RX ADMIN — CLOPIDOGREL BISULFATE 75 MILLIGRAM(S): 75 TABLET, FILM COATED ORAL at 12:01

## 2018-08-24 RX ADMIN — HEPARIN SODIUM 5000 UNIT(S): 5000 INJECTION INTRAVENOUS; SUBCUTANEOUS at 07:58

## 2018-08-24 RX ADMIN — CARBIDOPA AND LEVODOPA 1 TABLET(S): 25; 100 TABLET ORAL at 21:27

## 2018-08-24 RX ADMIN — CARBIDOPA AND LEVODOPA 1 TABLET(S): 25; 100 TABLET ORAL at 07:58

## 2018-08-24 RX ADMIN — CARBIDOPA AND LEVODOPA 1 TABLET(S): 25; 100 TABLET ORAL at 14:53

## 2018-08-24 RX ADMIN — Medication 100 MILLIGRAM(S): at 07:57

## 2018-08-24 RX ADMIN — AMLODIPINE BESYLATE 10 MILLIGRAM(S): 2.5 TABLET ORAL at 07:57

## 2018-08-24 RX ADMIN — HEPARIN SODIUM 5000 UNIT(S): 5000 INJECTION INTRAVENOUS; SUBCUTANEOUS at 14:54

## 2018-08-24 RX ADMIN — ATORVASTATIN CALCIUM 10 MILLIGRAM(S): 80 TABLET, FILM COATED ORAL at 21:27

## 2018-08-24 RX ADMIN — Medication 650 MILLIGRAM(S): at 21:30

## 2018-08-24 NOTE — H&P ADULT - PSH
No significant past surgical history Artificial pacemaker Artificial pacemaker    H/O percutaneous transluminal coronary angioplasty  w/ stent placement

## 2018-08-24 NOTE — CONSULT NOTE ADULT - ASSESSMENT
A: Unclear what to make of symptoms. No clinical CHF. Could be intermittent sighing or [place] rather than true intermittent SOB. Also vague on CP issue. Has had exertional symptoms sometimes and not others. No MI. No ectopy.    P: Rec monitor, Echo, consider conservative treatment if at all possible.  See details in dictated report.

## 2018-08-24 NOTE — PROCEDURE NOTE - ADDITIONAL PROCEDURE DETAILS
normal function dual chamber ppm. battery nearing TIFFANY, will f/u in outpatient EP clinic in 3 months.

## 2018-08-24 NOTE — H&P ADULT - PMH
Diabetes  diet controlled  Falls  last fall in January  Hypertension    Macular degeneration    Parkinson disease    Renal failure CKD (chronic kidney disease) stage 3, GFR 30-59 ml/min    Diabetes  diet controlled  Falls  last fall in January  Gout    Hypertension    Macular degeneration    Parkinson disease

## 2018-08-24 NOTE — H&P ADULT - ASSESSMENT
89 y/o M PMHx significant for CAD s/p PCI w/ stents, hypertension, hyperlipidemia, CKD stage III, Parkinson's, who was BIBA from home for further evaluation of intermittent chest pressure occurring at rest associated w/ brief periods of dyspnea. The patient describes his pain in intensity as 6/10 without notable radiation. Labs => TnI x 1 (-), BUN/Cr 40/1.81, D-dimer 253.

## 2018-08-24 NOTE — H&P ADULT - PROBLEM SELECTOR PLAN 1
~admit to Telemetry  ~DDx includes ACS vs PE  ~serial Jin/EKGs  ~f/u w/ Cardiology in the am  ~f/u w/ EP consultation for PPM interrogation  ~NPO for now  ~cont. ASA 81mg po daily  ~cont. Clopidogrel 75mg po daily  ~cont. Metoprolol ER 100mg po daily

## 2018-08-24 NOTE — H&P ADULT - NSHPPHYSICALEXAM_GEN_ALL_CORE
Vital Signs Last 24 Hrs  T(C): 36.6 (24 Aug 2018 02:30), Max: 36.7 (24 Aug 2018 01:57)  T(F): 97.9 (24 Aug 2018 02:30), Max: 98 (24 Aug 2018 01:57)  HR: 63 (24 Aug 2018 03:15) (61 - 94)  BP: 122/63 (24 Aug 2018 03:15) (121/63 - 135/70)  RR: 17 (24 Aug 2018 03:15) (15 - 18)  SpO2: 99% (24 Aug 2018 03:15) (98% - 99%)

## 2018-08-24 NOTE — PROGRESS NOTE ADULT - ASSESSMENT
A/P    #Chest pressure and shortness of breath   -ct to monitor in tele, cardiology evaluation appreciated- plan is to monitor him over weekended to see how he progresses- conservative measure   -ct medical treatment   -supportive care, TTE to follow     #PD- home meds     #CKD III- monitor it     #dvt pr

## 2018-08-24 NOTE — H&P ADULT - NSHPSOCIALHISTORY_GEN_ALL_CORE
Non-smoker  Denies EtOH or illict drug abuse  Lives at home Current Non-smoker  Denies EtOH or illict drug abuse  Lives at home

## 2018-08-24 NOTE — CONSULT NOTE ADULT - SUBJECTIVE AND OBJECTIVE BOX
See dictated cardiac consult in the  Portal.  S: has had episodic "SOB" episodes increased over last 2 days. Occur every few minutes day of adm now gradually less frequent and 1/2 hour between Sx. Has had no real chest pain but a discomfort or hollow chest feeling that he has trouble describing. Has had [place] and Chest discomfort on exertion but not every time he exerts self (BRP is most exertion he does walking to BR). Parkinson's limits his ability top move around.  Feels better now. Also has constipation and headaches.  Knows prior YELENA and CAD.    O: Comfortable in bed, NAD. /65 and HR 67 R 14 T 98  No diaph  No vis JVP  Clear lungs ant with dec BS post bases.  RRR 2/6 Systolic murmur no G  Abd non tender, soft  No edema. Pulses 2+ radial bebeto and both feet warm  Alert and aware and knows me, where he is, and self.  ECG A paced and RBBB, LAHB, NSSTT  Monitor A paced/SR  Hb 13.1 Pl 177  DD inc 253  Troponin nl x2  K 4.0 Cr 1.61 Mg 2.2

## 2018-08-24 NOTE — PATIENT PROFILE ADULT. - PMH
CKD (chronic kidney disease) stage 3, GFR 30-59 ml/min    Diabetes  diet controlled  Falls  last fall in January  Gout    Hypertension    Macular degeneration    Parkinson disease

## 2018-08-25 PROBLEM — N18.3 CHRONIC KIDNEY DISEASE, STAGE 3 (MODERATE): Chronic | Status: ACTIVE | Noted: 2018-08-24

## 2018-08-25 PROBLEM — M10.9 GOUT, UNSPECIFIED: Chronic | Status: ACTIVE | Noted: 2018-08-24

## 2018-08-25 PROCEDURE — 93010 ELECTROCARDIOGRAM REPORT: CPT

## 2018-08-25 PROCEDURE — 70450 CT HEAD/BRAIN W/O DYE: CPT | Mod: 26

## 2018-08-25 RX ORDER — ASPIRIN/CALCIUM CARB/MAGNESIUM 324 MG
162 TABLET ORAL ONCE
Qty: 0 | Refills: 0 | Status: COMPLETED | OUTPATIENT
Start: 2018-08-25 | End: 2018-08-25

## 2018-08-25 RX ORDER — ASPIRIN/CALCIUM CARB/MAGNESIUM 324 MG
243 TABLET ORAL ONCE
Qty: 0 | Refills: 0 | Status: DISCONTINUED | OUTPATIENT
Start: 2018-08-25 | End: 2018-08-25

## 2018-08-25 RX ADMIN — Medication 100 MILLIGRAM(S): at 05:32

## 2018-08-25 RX ADMIN — HEPARIN SODIUM 5000 UNIT(S): 5000 INJECTION INTRAVENOUS; SUBCUTANEOUS at 05:32

## 2018-08-25 RX ADMIN — CLOPIDOGREL BISULFATE 75 MILLIGRAM(S): 75 TABLET, FILM COATED ORAL at 12:31

## 2018-08-25 RX ADMIN — ATORVASTATIN CALCIUM 10 MILLIGRAM(S): 80 TABLET, FILM COATED ORAL at 21:40

## 2018-08-25 RX ADMIN — AMLODIPINE BESYLATE 10 MILLIGRAM(S): 2.5 TABLET ORAL at 05:32

## 2018-08-25 RX ADMIN — CARBIDOPA AND LEVODOPA 1 TABLET(S): 25; 100 TABLET ORAL at 14:43

## 2018-08-25 RX ADMIN — HEPARIN SODIUM 5000 UNIT(S): 5000 INJECTION INTRAVENOUS; SUBCUTANEOUS at 16:32

## 2018-08-25 RX ADMIN — CARBIDOPA AND LEVODOPA 1 TABLET(S): 25; 100 TABLET ORAL at 21:40

## 2018-08-25 RX ADMIN — Medication 81 MILLIGRAM(S): at 12:31

## 2018-08-25 RX ADMIN — Medication 162 MILLIGRAM(S): at 17:39

## 2018-08-25 RX ADMIN — HEPARIN SODIUM 5000 UNIT(S): 5000 INJECTION INTRAVENOUS; SUBCUTANEOUS at 21:39

## 2018-08-25 RX ADMIN — CARBIDOPA AND LEVODOPA 1 TABLET(S): 25; 100 TABLET ORAL at 05:32

## 2018-08-25 NOTE — CHART NOTE - NSCHARTNOTEFT_GEN_A_CORE
Called to a CODE stroke for delirium    Upon arrival the patient with vivid hallucinations no focal deficits except findings C/W his PD.  I find no indication for TPA.  Patient handed off to Dr. Vazquez at bedside.  Handoff also discussed with bedside nurse.  W/U as per Dr. Vazquez.

## 2018-08-25 NOTE — PROGRESS NOTE ADULT - ASSESSMENT
A: Since his dyspnea episodes gone I cannot say they are not due to al element of coronary insufficiency and the chest discomfort was reported to be with activity. In the past had neg P-MPI but had "angina" controlled with sl NTG. At this point it would be useful to demo if there is ischemia or not as a management and quality of life issue. I feel that a Lexiscan MPI test Monday will help in this regard so I will order it.  I am still not convinced of an ACS. Echo is pending as well.    P: Lexiscan Monday. Can have meds even if NPO. Hospitalist can make sure meds not held. Patient agrees to testing. Wife aware of potential testing as well.  If scan neg then I cannot suggest Tx for his symptoms. If + ischemia then either nitrates or cath/PCI depending on renal risk assessment of cath and dye admin.  Await further info. Echocardiogram.

## 2018-08-25 NOTE — CHART NOTE - NSCHARTNOTEFT_GEN_A_CORE
code stroke called  Patient found to be AMS around 2 pm ,   when I saw patient last at 9am ,he was oriented to person ,place and year code stroke called fro acute altered mental status  Patient found to be AMS around 2 pm ,   when I saw patient last at 9am ,he was oriented to person ,place and year   now confused , not oriented to place and time  follows commands   no drift  NIHSS 2 -did not know month and age  not candidate by TPA  as time of onset unknown, moreover patient with advanced parkinsons   will order stat CT head  neuro consult code stroke called fro acute altered mental status  Patient found to be AMS around 2 pm ,   when I saw patient last at 9am ,he was oriented to person ,place and year   now confused , not oriented to place and time  follows commands   no drift  NIHSS 2 -did not know month and age  not candidate by TPA  as time of onset unknown, moreover patient with advanced parkinsons   will order stat CT head  I discussed with neuro on call  neuro consult code stroke called fro acute altered mental status  Patient found to be AMS around 2 pm ,   when I saw patient last at 9am ,he was oriented to person ,place and year   now confused , not oriented to place and time  follows commands   no drift  NIHSS 2 -did not know month and age  not candidate by TPA  as time of onset unknown, moreover patient with advanced parkinsons   will order stat CT head  I discussed with neuro on call  neuro consult  35 mins spent oncritical care time code stroke called fro acute altered mental status  Patient found to be AMS around 2 pm ,   when I saw patient last at 9am ,he was oriented to person ,place and year   now confused , not oriented to place and time  follows commands   no drift  NIHSS 2 -did not know month and age  not candidate by TPA  as time of onset unknown, moreover patient with advanced parkinsons   will order stat CT head  CT head negative for acute pathology, will give 162mg asa x1 as patient already on asa 81 and plavix 75  continue tele  echo,neurochecks, speech and swallow, dysphagia screen stat prior to giving po meds  continue statin  I discussed with neuro on call  neuro consult  35 mins spent oncritical care time code stroke called fro acute altered mental status  Patient found to be AMS around 2 pm ,   when I saw patient last at 9am ,he was oriented to person ,place and year   now confused , not oriented to place and time  follows commands   no drift  NIHSS 2 -did not know month and age  not candidate by TPA  as time of onset unknown, moreover patient with advanced parkinsons   will order stat CT head  CT head negative for acute pathology, will give 162mg asa x1 as patient already on asa 81 and plavix 75  continue tele  echo,neurochecks, speech and swallow, dysphagia screen stat prior to giving po meds  continue statin  I discussed with neuro on call  neuro consult  35 mins spent oncritical care time    addendum   will also consider  further w/u with serum B12, tsh,UA, unable to do MRI due to PPM, ?eeg  neuro f/u

## 2018-08-25 NOTE — PROGRESS NOTE ADULT - ASSESSMENT
· Assessment		  89 y/o M PMHx significant for CAD s/p PCI w/ stents, hypertension, hyperlipidemia, CKD stage III, Parkinson's, who was BIBA from home for further evaluation of intermittent chest pressure occurring at rest associated w/ brief periods of dyspnea. The patient describes his pain in intensity as 6/10 without notable radiation. Labs => TnI x 1 (-), BUN/Cr 40/1.81, D-dimer 253.       # Chest pain. /sob which have now resolved     Telemetry  V/Q scan negative  PPM interrogated -wnl  plan for lexiscan MPI mon   trop x3 neg  ~cont. ASA 81mg po daily  ~cont. Clopidogrel 75mg po daily  ~cont. Metoprolol ER 100mg po daily.     #  : Diabetes.     ~FS qAC  ~currently diet-controlled.       #Parkinson disease.  Plan: ~cont. Carbidopa-levodaopa 25-100mg po tid.     #: Hypertension.  Plan: ~cont. Amlodipine 10mg po daily.     #   Hyperlipidemia.  Plan: ~cont. statin therapy w/ Atorvastatin 10mg po qHS.   #   Gout. Plan: ~cont. Uloric 40mg po daily.    #   ~IMPROVE risk score is 2  ~cont. Heparin sq. · Assessment		  89 y/o M PMHx significant for CAD s/p PCI w/ stents, hypertension, hyperlipidemia, CKD stage III, Parkinson's, who was BIBA from home for further evaluation of intermittent chest pressure occurring at rest associated w/ brief periods of dyspnea. The patient describes his pain in intensity as 6/10 without notable radiation. Labs => TnI x 1 (-), BUN/Cr 40/1.81, D-dimer 253.       # Chest pain. /sob which have now resolved     Telemetry  V/Q scan negative  PPM interrogated -wnl  plan for lexiscan MPI mon   trop x3 neg  ~cont. ASA 81mg po daily  ~cont. Clopidogrel 75mg po daily  ~cont. Metoprolol ER 100mg po daily.     #  : Diabetes.     ~FS qAC  ~currently diet-controlled.       #Parkinson disease.  Plan: ~cont. Carbidopa-levodaopa 25-100mg po tid.     #: Hypertension.  Plan: ~cont. Amlodipine 10mg po daily.     #   Hyperlipidemia.  Plan: ~cont. statin therapy w/ Atorvastatin 10mg po qHS.   #   Gout. Plan: ~cont. Uloric 40mg po daily.    #   ~IMPROVE risk score is 2  ~cont. Heparin sq.     45 mins spent on total encounter

## 2018-08-26 LAB
ANION GAP SERPL CALC-SCNC: 8 MMOL/L — SIGNIFICANT CHANGE UP (ref 5–17)
BASOPHILS # BLD AUTO: 0.06 K/UL — SIGNIFICANT CHANGE UP (ref 0–0.2)
BASOPHILS NFR BLD AUTO: 0.7 % — SIGNIFICANT CHANGE UP (ref 0–2)
BUN SERPL-MCNC: 30 MG/DL — HIGH (ref 7–23)
CALCIUM SERPL-MCNC: 9 MG/DL — SIGNIFICANT CHANGE UP (ref 8.5–10.1)
CHLORIDE SERPL-SCNC: 112 MMOL/L — HIGH (ref 96–108)
CO2 SERPL-SCNC: 24 MMOL/L — SIGNIFICANT CHANGE UP (ref 22–31)
CREAT SERPL-MCNC: 1.56 MG/DL — HIGH (ref 0.5–1.3)
EOSINOPHIL # BLD AUTO: 0.09 K/UL — SIGNIFICANT CHANGE UP (ref 0–0.5)
EOSINOPHIL NFR BLD AUTO: 1 % — SIGNIFICANT CHANGE UP (ref 0–6)
GLUCOSE SERPL-MCNC: 142 MG/DL — HIGH (ref 70–99)
HCT VFR BLD CALC: 40.7 % — SIGNIFICANT CHANGE UP (ref 39–50)
HGB BLD-MCNC: 13.7 G/DL — SIGNIFICANT CHANGE UP (ref 13–17)
IMM GRANULOCYTES NFR BLD AUTO: 0.7 % — SIGNIFICANT CHANGE UP (ref 0–1.5)
LYMPHOCYTES # BLD AUTO: 0.85 K/UL — LOW (ref 1–3.3)
LYMPHOCYTES # BLD AUTO: 9.9 % — LOW (ref 13–44)
MCHC RBC-ENTMCNC: 30.4 PG — SIGNIFICANT CHANGE UP (ref 27–34)
MCHC RBC-ENTMCNC: 33.7 GM/DL — SIGNIFICANT CHANGE UP (ref 32–36)
MCV RBC AUTO: 90.4 FL — SIGNIFICANT CHANGE UP (ref 80–100)
MONOCYTES # BLD AUTO: 0.68 K/UL — SIGNIFICANT CHANGE UP (ref 0–0.9)
MONOCYTES NFR BLD AUTO: 7.9 % — SIGNIFICANT CHANGE UP (ref 2–14)
NEUTROPHILS # BLD AUTO: 6.88 K/UL — SIGNIFICANT CHANGE UP (ref 1.8–7.4)
NEUTROPHILS NFR BLD AUTO: 79.8 % — HIGH (ref 43–77)
NRBC # BLD: 0 /100 WBCS — SIGNIFICANT CHANGE UP (ref 0–0)
PLATELET # BLD AUTO: 179 K/UL — SIGNIFICANT CHANGE UP (ref 150–400)
POTASSIUM SERPL-MCNC: 3.9 MMOL/L — SIGNIFICANT CHANGE UP (ref 3.5–5.3)
POTASSIUM SERPL-SCNC: 3.9 MMOL/L — SIGNIFICANT CHANGE UP (ref 3.5–5.3)
RBC # BLD: 4.5 M/UL — SIGNIFICANT CHANGE UP (ref 4.2–5.8)
RBC # FLD: 13.5 % — SIGNIFICANT CHANGE UP (ref 10.3–14.5)
SODIUM SERPL-SCNC: 144 MMOL/L — SIGNIFICANT CHANGE UP (ref 135–145)
TSH SERPL-MCNC: 4.12 UU/ML — SIGNIFICANT CHANGE UP (ref 0.34–4.82)
VIT B12 SERPL-MCNC: 673 PG/ML — SIGNIFICANT CHANGE UP (ref 232–1245)
WBC # BLD: 8.62 K/UL — SIGNIFICANT CHANGE UP (ref 3.8–10.5)
WBC # FLD AUTO: 8.62 K/UL — SIGNIFICANT CHANGE UP (ref 3.8–10.5)

## 2018-08-26 PROCEDURE — 99223 1ST HOSP IP/OBS HIGH 75: CPT

## 2018-08-26 RX ADMIN — HEPARIN SODIUM 5000 UNIT(S): 5000 INJECTION INTRAVENOUS; SUBCUTANEOUS at 05:49

## 2018-08-26 RX ADMIN — CARBIDOPA AND LEVODOPA 1 TABLET(S): 25; 100 TABLET ORAL at 22:06

## 2018-08-26 RX ADMIN — ATORVASTATIN CALCIUM 10 MILLIGRAM(S): 80 TABLET, FILM COATED ORAL at 22:06

## 2018-08-26 RX ADMIN — CARBIDOPA AND LEVODOPA 1 TABLET(S): 25; 100 TABLET ORAL at 14:21

## 2018-08-26 RX ADMIN — HEPARIN SODIUM 5000 UNIT(S): 5000 INJECTION INTRAVENOUS; SUBCUTANEOUS at 22:06

## 2018-08-26 RX ADMIN — Medication 81 MILLIGRAM(S): at 11:33

## 2018-08-26 RX ADMIN — CLOPIDOGREL BISULFATE 75 MILLIGRAM(S): 75 TABLET, FILM COATED ORAL at 11:33

## 2018-08-26 RX ADMIN — AMLODIPINE BESYLATE 10 MILLIGRAM(S): 2.5 TABLET ORAL at 05:49

## 2018-08-26 RX ADMIN — CARBIDOPA AND LEVODOPA 1 TABLET(S): 25; 100 TABLET ORAL at 05:49

## 2018-08-26 RX ADMIN — Medication 100 MILLIGRAM(S): at 05:49

## 2018-08-26 RX ADMIN — HEPARIN SODIUM 5000 UNIT(S): 5000 INJECTION INTRAVENOUS; SUBCUTANEOUS at 14:21

## 2018-08-26 NOTE — CONSULT NOTE ADULT - ASSESSMENT
89 y/o M PMHx of CAD s/p stents, hypertension, hyperlipidemia, CKD stage III, Parkinson's ds, was admitted with intermittent chest pressure at rest associated w/ brief periods of dyspnea. On 8/25/18, pt was noted to have visual hallucinations, was lightheaded- confused; Code stroke was called, by the time pt was evaluated by stroke team, sx had resolved. CT head done was unremakable, Code was cancelled by Dr. Vazquez.    Pt is alert, attentive, O X 3, hypophonic - no aphasia, is fully cognisant, has exam c/w PD.    # PD    - Continue Sinemet same dose  - PT for balance/gait training    # Episode of encephalopathy / hallucinations/presyncope; Doubt this to be TIA / Stroke; martha PD associated off phenomenon or vis. Hallucination    - No further w/u needed    D/W Pt and Dr. Vazquez

## 2018-08-26 NOTE — CONSULT NOTE ADULT - SUBJECTIVE AND OBJECTIVE BOX
CC: 89 y old  Male who presents with a chief complaint of Chest pain (24 Aug 2018 05:24)    HPI:  89 y/o M PMHx significant for CAD s/p PCI w/ stents, hypertension, hyperlipidemia, CKD stage III, Parkinson's ds, who was admitted for intermittent chest pressure occurring at rest associated w/ brief periods of dyspnea.     On 8/25/18, pt was noted to have visual hallucinations, was lightheaded- confused; Code stroke was called, by the time pt was evaluated by stroke team, sx had resolved. CT head done was unremakable, Code was cancelled by Dr. Vazquez, routine consult requested.    At the time of exam today, pt is alert, attentive, O X 3, hypophonic - no aphasia, is fully cognisant. He has no complaints, is aware of his PD limitations      PAST MEDICAL & SURGICAL HISTORY:  Gout  CKD (chronic kidney disease) stage 3, GFR 30-59 ml/min  Falls: last fall in January  Parkinson disease  Diabetes: diet controlled  Macular degeneration  Hypertension  H/O percutaneous transluminal coronary angioplasty: w/ stent placement  Artificial pacemaker      FAMILY HISTORY:  No pertinent family history in first degree relatives      Social Hx:  Nonsmoker, no drug or alcohol use    MEDICATIONS  (STANDING):  amLODIPine   Tablet 10 milliGRAM(s) Oral daily  aspirin  chewable 81 milliGRAM(s) Oral daily  atorvastatin 10 milliGRAM(s) Oral at bedtime  carbidopa/levodopa  25/100 1 Tablet(s) Oral three times a day  clopidogrel Tablet 75 milliGRAM(s) Oral daily  heparin  Injectable 5000 Unit(s) SubCutaneous every 8 hours  metoprolol succinate  milliGRAM(s) Oral daily     Allergies    No Known Drug Allergies  Originally Entered as [Hives] reaction to [Insect Extracts] (Unknown)    Intolerances    ROS: Pertinent positives in HPI, all other ROS were reviewed and are negative.      Vital Signs Last 24 Hrs  T(C): 36.8 (26 Aug 2018 11:42), Max: 36.9 (25 Aug 2018 16:44)  T(F): 98.3 (26 Aug 2018 11:42), Max: 98.5 (25 Aug 2018 16:44)  HR: 79 (26 Aug 2018 11:42) (78 - 89)  BP: 139/65 (26 Aug 2018 11:42) (139/65 - 154/65)  BP(mean): --  RR: 17 (26 Aug 2018 11:42) (17 - 17)  SpO2: 96% (26 Aug 2018 11:42) (96% - 97%)    GE:   Constitutional: awake and alert.  HEENT: PERRLA, EOMI,   Neck: Supple.  Respiratory: Breath sounds are clear bilaterally  Cardiovascular: S1 and S2, regular / irregular rhythm  Gastrointestinal: soft, nontender  Extremities:  no edema  Vascular: Caritid Bruit - no  Musculoskeletal: no joint swelling/tenderness, no abnormal movements  Skin: No rashes    Neurological exam:  HF: A x O x 3. alert, attentive, hypophonia - no aphasia, is fully cognisant, interactive. Naming /repetition intact   CN: Mask-like facies, JENNY, EOMI, VFF, facial sensation normal, no NLFD, tongue midline  Motor: No pronator drift, Strength 5/5 in all 4 ext, Hypertonis/ rigidity and bradykinesia; left > right side. Intermittent rest tremors, left side    Sens: Intact to light touch     Reflexes: Symmetric and normal . downgoing toes b/l  Coord:  No FNFA, dysmetria,   Gait/Balance: Not tested    Labs:   08-26    144  |  112<H>  |  30<H>  ----------------------------<  142<H>  3.9   |  24  |  1.56<H>    Ca    9.0      26 Aug 2018 06:24                        13.7   8.62  )-----------( 179      ( 26 Aug 2018 06:24 )             40.7     Radiology:  - CT Head: < from: CT Brain Stroke Protocol (08.25.18 @ 15:35) >  No acute intracranial hemorrhage, mass effect, or midline shift.     < end of copied text >

## 2018-08-26 NOTE — SWALLOW BEDSIDE ASSESSMENT ADULT - COMMENTS
The patient was admitted to  with SOB/chest pressure which resolved. He also experienced a transient episode of altered mentation while in hospital at which time a code stroke was called but his mentation improved as well. This profile is superimposed upon a history of recent shingles at site of the back of his neck, Parkinson's Disease, CAD s/p stent placement, pacemaker placement, HTN, CKD, HLD, DM, gout, macular degeneration and frequent falls. This patient was admitted to  with SOB/chest pressure which resolved. He also experienced a transient episode of altered mentation while in hospital at which time a code stroke was called but his mentation improved as well. This profile is superimposed upon a history of recent shingles at site of the back of his neck, Parkinson's Disease, CAD s/p stent placement, pacemaker placement, HTN, CKD, HLD, DM, gout, macular degeneration and frequent falls.

## 2018-08-26 NOTE — SWALLOW BEDSIDE ASSESSMENT ADULT - SWALLOW EVAL: RECOMMENDED DIET
SUGGEST A REGULAR TEXTURE DIET WITH THIN LIQUID CONSISTENCIES AS HE APPEARED CLINICALLY TOLERANT OF THESE FOOD CONSISTENCIES FROM AN OROPHARYNGEAL SWALLOWING PERSPECTIVE ON CLINICAL EXAM.

## 2018-08-26 NOTE — SWALLOW BEDSIDE ASSESSMENT ADULT - SLP GENERAL OBSERVATIONS
On encounter, a cervical kyphosis was noted, a scab was apparent on his nose and diffuse muscle rigidity was apparent. The patient was alert. His affect was flat. The pt was interactive and able to verbalize during communicative probes as well as in conversation. At these times, his motor speech abilities were felt to be functional, his speech output was 100% intelligible and his utterances were linguistically intact/contextually appropriate. The pt was able to effectively verbalize his needs and states that he is at communicative baseline. Note that pt denied aspiration signs or odynophagia when asked about eating.

## 2018-08-26 NOTE — SWALLOW BEDSIDE ASSESSMENT ADULT - SWALLOW EVAL: CRITERIA FOR SKILLED INTERVENTION MET
ACUTE SPEECH PATHOLOGY INTERVENTION WOULD NOT CHANGE IMMEDIATE MANAGEMENT/OUTCOME. PT'S OROPHARYNGEAL SWALLOWING/COMMUNICATIVE POTENTIAL  LO/no problems identified which require skilled intervention ACUTE SPEECH PATHOLOGY INTERVENTION WOULD NOT CHANGE IMMEDIATE MANAGEMENT/OUTCOME. ACUTE SPEECH PATHOLOGY INTERVENTION IS NOT CLINICALLY WARRANTED.  PT'S OROPHARYNGEAL SWALLOWING/COMMUNICATIVE POTENTIAL ARE FELT TO BE MAXIMIZED/AT USUAL STATE. GIVEN ABOVE, WILL NOT ACTIVELY FOLLOW. RECONSULT PRN./no problems identified which require skilled intervention

## 2018-08-26 NOTE — PROGRESS NOTE ADULT - ASSESSMENT
A: Variable CNS status now better.  R/B favors Lexiscan if it can be done (he did do the V/Q scan ok). I would hope it would be neg and we can then not have to consider treatment for the atypical symptoms not related to ischemia. If abnormal would favor nitrates and avoid invasive eval/Tx. Do not wish to maybe cause worsening renal function and renal failure from contrast.  HTN control is adequate (want to avoid Sx from [place] which he also has and avoid risk of low BP)  VHD clinically unchanged. Echo still pending. He does not see me in office after home and gets PCP care from a home visit MD who I do not know.    P: Lexiscan tomorrow. If neg I cannot suggest treatment for the CP/SOB symptoms. Echo.  Discussed with patient and neuro and H-MD.  Nitrates if ischemia would be my advice. We have used sl NTG in past for CP with relief. Now unclear if simon tis necessary if no ischemia on scan.  DAPT to continue.

## 2018-08-26 NOTE — SWALLOW BEDSIDE ASSESSMENT ADULT - SWALLOW EVAL: DIAGNOSIS
1) The patient demonstrates Oropharyngeal Swallowing abilities which subjectively appear to be grossly within functional parameters for age. No behavioral aspiration signs were exhibited on exam. Odynophagia was denied.   2) The patient was alert. His affect was flat. The pt was interactive and able to verbalize during communicative probes as well as in conversation. At these times, his motor speech abilities were felt to be functional, his speech output was 100% intelligible and his utterances were linguistically intact/contextually appropriate. The pt was able to effectively verbalize his needs and states that he is at communicative baseline.

## 2018-08-27 ENCOUNTER — TRANSCRIPTION ENCOUNTER (OUTPATIENT)
Age: 83
End: 2018-08-27

## 2018-08-27 PROCEDURE — 93016 CV STRESS TEST SUPVJ ONLY: CPT

## 2018-08-27 PROCEDURE — 93018 CV STRESS TEST I&R ONLY: CPT

## 2018-08-27 PROCEDURE — 78452 HT MUSCLE IMAGE SPECT MULT: CPT | Mod: 26

## 2018-08-27 RX ORDER — REGADENOSON 0.08 MG/ML
0.4 INJECTION, SOLUTION INTRAVENOUS ONCE
Qty: 0 | Refills: 0 | Status: COMPLETED | OUTPATIENT
Start: 2018-08-27 | End: 2018-08-27

## 2018-08-27 RX ADMIN — AMLODIPINE BESYLATE 10 MILLIGRAM(S): 2.5 TABLET ORAL at 06:47

## 2018-08-27 RX ADMIN — HEPARIN SODIUM 5000 UNIT(S): 5000 INJECTION INTRAVENOUS; SUBCUTANEOUS at 06:48

## 2018-08-27 RX ADMIN — ATORVASTATIN CALCIUM 10 MILLIGRAM(S): 80 TABLET, FILM COATED ORAL at 21:55

## 2018-08-27 RX ADMIN — CARBIDOPA AND LEVODOPA 1 TABLET(S): 25; 100 TABLET ORAL at 13:13

## 2018-08-27 RX ADMIN — HEPARIN SODIUM 5000 UNIT(S): 5000 INJECTION INTRAVENOUS; SUBCUTANEOUS at 21:55

## 2018-08-27 RX ADMIN — CLOPIDOGREL BISULFATE 75 MILLIGRAM(S): 75 TABLET, FILM COATED ORAL at 13:13

## 2018-08-27 RX ADMIN — CARBIDOPA AND LEVODOPA 1 TABLET(S): 25; 100 TABLET ORAL at 06:47

## 2018-08-27 RX ADMIN — Medication 100 MILLIGRAM(S): at 06:47

## 2018-08-27 RX ADMIN — HEPARIN SODIUM 5000 UNIT(S): 5000 INJECTION INTRAVENOUS; SUBCUTANEOUS at 13:13

## 2018-08-27 RX ADMIN — Medication 81 MILLIGRAM(S): at 13:13

## 2018-08-27 RX ADMIN — CARBIDOPA AND LEVODOPA 1 TABLET(S): 25; 100 TABLET ORAL at 21:55

## 2018-08-27 RX ADMIN — Medication 100 MILLIGRAM(S): at 13:14

## 2018-08-27 RX ADMIN — REGADENOSON 0.4 MILLIGRAM(S): 0.08 INJECTION, SOLUTION INTRAVENOUS at 11:00

## 2018-08-27 NOTE — DISCHARGE NOTE ADULT - PATIENT PORTAL LINK FT
You can access the [facility] Patient Portal, offered by [facility], by registering with the following website: [email or web address]

## 2018-08-27 NOTE — DISCHARGE NOTE ADULT - MEDICATION SUMMARY - MEDICATIONS TO TAKE
I will START or STAY ON the medications listed below when I get home from the hospital:    aspirin 81 mg oral tablet, chewable  -- 1 tab(s) by mouth once a day  -- Indication: For .    Lipitor 10 mg oral tablet  -- 1 tab(s) by mouth once a day (at bedtime)  -- Indication: For .    Uloric 40 mg oral tablet  -- 1 tab(s) by mouth once a day  -- Indication: For .    carbidopa-levodopa 25 mg-100 mg oral tablet  -- 1 tab(s) by mouth 3 times a day  -- Indication: For .    Plavix 75 mg oral tablet  -- 1 tab(s) by mouth once a day  -- Indication: For .    Toprol- mg oral tablet, extended release  -- 1 tab(s) by mouth once a day  -- Indication: For .    amLODIPine 10 mg oral tablet  -- 1 tab(s) by mouth once a day  -- Indication: For .    Acidophilus oral capsule  -- 1 cap(s) by mouth once a day  -- Indication: For .    cholecalciferol 1000 intl units oral tablet  -- 1 tab(s) by mouth 3 times a day  -- Indication: For .

## 2018-08-27 NOTE — DISCHARGE NOTE ADULT - CARE PROVIDER_API CALL
Jalen Jara), Cardiovascular Disease; Internal Medicine  [address] [place] [address]  Phone: [phone]  Fax: [phone] Kamran Martel (MD), Cardiovascular Disease; Critical Care Medicine; Internal Medicine  [address] [facility] [place] [address]  Phone: [phone]  Fax: [phone]    Paresh Owens), Medicine  [address] [place] [address]  Phone: [phone]  Fax: [phone]

## 2018-08-27 NOTE — DISCHARGE NOTE ADULT - CARE PLAN
Principal Discharge DX:	Chest pain  Goal:	diagnosed with atypical chest pain likely musculoskeletal  Assessment and plan of treatment:	see discharge summary

## 2018-08-27 NOTE — DISCHARGE NOTE ADULT - HOSPITAL COURSE
· Subjective and Objective: 	  Reason for Admission: Chest pain	  History of Present Illness: 	  89 y/o M PMHx significant for CAD s/p PCI w/ stents, hypertension, hyperlipidemia, CKD stage III, Parkinson's, who was BIBA from home for further evaluation of intermittent chest pressure occurring at rest associated w/ brief periods of dyspnea. The patient describes his pain in intensity as 6/10 without notable radiation. Labs => TnI x 1 (-), BUN/Cr 40/1.81, D-dimer 253.   8/28- no complaints  constitutional:NAD  HEENT: Atraumatic, MORGAN, Normal, No congestion  Respiratory: Breath Sounds normal, no rhonchi/wheeze  Cardiovascular: N S1S2;   Gastrointestinal: Abdomen soft, non tender, Bowel Ssounds present  Extremities: No edema, peripheral pulses present  Neurological: AAO x 3, no gross focal motor deficits   Musculoskeletal: non tender  Breasts: Deferred  Genitourinary: deferred  · Assessment		  89 y/o M PMHx significant for CAD s/p PCI w/ stents, hypertension, hyperlipidemia, CKD stage III, Parkinson's, who was BIBA from home for further evaluation of intermittent chest pressure occurring at rest associated w/ brief periods of dyspnea. The patient describes his pain in intensity as 6/10 without notable radiation. Labs => TnI x 1 (-), BUN/Cr 40/1.81, D-dimer 253.       # Chest pain. /sob which have now resolved  - likely musculoskeletal vs no clear etiology   Telemetry- no significant alarms  V/Q scan negative  PPM interrogated -wnl  lexiscan stress test -negative for ischemia  trop x3 neg  ~cont. ASA 81mg po daily  ~cont. Clopidogrel 75mg po daily  ~cont. Metoprolol ER 100mg po daily.     #  : Diabetes.     ~FS qAC  ~currently diet-controlled.       #Parkinson disease.  Plan: ~cont. Carbidopa-levodaopa 25-100mg po tid.  patient had transient episode of confusion and hallucinations likely related to his advanced parkinsons disease/stroke ruled out/unlikely TIA  head CT negative ,TSH and vit b12 wnl, UA wnl, infectious etiology ruled out  now mentation at baseline   eeg negative      #: Hypertension.  Plan: ~cont. Amlodipine 10mg po daily.    Hyperlipidemia.  Plan: ~cont. statin therapy w/ Atorvastatin 10mg po qHS.   #   Gout. Plan: ~cont. Uloric 40mg po daily.    discharge patient home with 24 hrs home health aide  spoke with wife who agreed and f/u with home MD within 1 week  continue outpatient neuro/psych consult for advancing parkinsons diease and to evaluate for dementia · Subjective and Objective: 	  Reason for Admission: Chest pain	  History of Present Illness: 	  89 y/o M PMHx significant for CAD s/p PCI w/ stents, hypertension, hyperlipidemia, CKD stage III, Parkinson's, who was BIBA from home for further evaluation of intermittent chest pressure occurring at rest associated w/ brief periods of dyspnea. The patient describes his pain in intensity as 6/10 without notable radiation. Labs => TnI x 1 (-), BUN/Cr 40/1.81, D-dimer 253.   8/28- no complaints  constitutional:NAD  HEENT: Atraumatic, MORGAN, Normal, No congestion  Respiratory: Breath Sounds normal, no rhonchi/wheeze  Cardiovascular: N S1S2;   Gastrointestinal: Abdomen soft, non tender, Bowel Ssounds present  Extremities: No edema, peripheral pulses present  Neurological: AAO x 3, no gross focal motor deficits   Musculoskeletal: non tender  Breasts: Deferred  Genitourinary: deferred  · Assessment		  89 y/o M PMHx significant for CAD s/p PCI w/ stents, hypertension, hyperlipidemia, CKD stage III, Parkinson's, who was BIBA from home for further evaluation of intermittent chest pressure occurring at rest associated w/ brief periods of dyspnea. The patient describes his pain in intensity as 6/10 without notable radiation. Labs => TnI x 1 (-), BUN/Cr 40/1.81, D-dimer 253.       # Chest pain. /sob which have now resolved  - likely musculoskeletal vs no clear etiology   Telemetry- no significant alarms  V/Q scan negative  PPM interrogated -wnl  lexiscan stress test -negative for ischemia  trop x3 neg  ~cont. ASA 81mg po daily  ~cont. Clopidogrel 75mg po daily  ~cont. Metoprolol ER 100mg po daily.     #  : Diabetes.     ~FS qAC  ~currently diet-controlled.       #Parkinson disease.  Plan: ~cont. Carbidopa-levodaopa 25-100mg po tid.  patient had transient episode of confusion and hallucinations likely related to his advanced parkinsons disease/stroke ruled out/unlikely TIA  head CT negative ,TSH and vit b12 wnl, UA wnl, infectious etiology ruled out  now mentation at baseline   no further recomended by neuro       #: Hypertension.  Plan: ~cont. Amlodipine 10mg po daily.    Hyperlipidemia.  Plan: ~cont. statin therapy w/ Atorvastatin 10mg po qHS.   #   Gout. Plan: ~cont. Uloric 40mg po daily.    discharge patient home with 24 hrs home health aide  spoke with wife who agreed and f/u with home MD within 1 week  continue outpatient neuro/psych consult for advancing parkinsons diease and to evaluate for dementia

## 2018-08-27 NOTE — PROGRESS NOTE ADULT - ASSESSMENT
A: Homebound. Variable CNS and Parkinson's and renal insufficiency.  I favor conservative CVD maangement if at all possible.  Lexiscan pending and I made Dr. Palla aware of the issues involved (he is covering me today)  Would try avoid cath and treat with SL NTG prn as I did before if ischemia is present.  If no ischemia then I cannot suggest Tx for the Sx. No clinical chage in VHD and no CHF clinically.    P: Lexiscan. If neg can go home. No change in CVD Tx if neg test. If ischemia would advise NTG sl prn chest pain.  Cannot FU as he does not come to office. All Rx done by a home care MD. Px is guarded.  Continue DAPT. Echo not done but EF can come from Lexiscan.

## 2018-08-27 NOTE — PROGRESS NOTE ADULT - ASSESSMENT
· Assessment		  89 y/o M PMHx significant for CAD s/p PCI w/ stents, hypertension, hyperlipidemia, CKD stage III, Parkinson's, who was BIBA from home for further evaluation of intermittent chest pressure occurring at rest associated w/ brief periods of dyspnea. The patient describes his pain in intensity as 6/10 without notable radiation. Labs => TnI x 1 (-), BUN/Cr 40/1.81, D-dimer 253.       # Chest pain. /sob which have now resolved     Telemetry  V/Q scan negative  PPM interrogated -wnl  lexiscan stress test -negative for ischemia  trop x3 neg  ~cont. ASA 81mg po daily  ~cont. Clopidogrel 75mg po daily  ~cont. Metoprolol ER 100mg po daily.     #  : Diabetes.     ~FS qAC  ~currently diet-controlled.       #Parkinson disease.  Plan: ~cont. Carbidopa-levodaopa 25-100mg po tid.     #: Hypertension.  Plan: ~cont. Amlodipine 10mg po daily. #   Hyperlipidemia.  Plan: ~cont. statin therapy w/ Atorvastatin 10mg po qHS.   #   Gout. Plan: ~cont. Uloric 40mg po daily.    #   ~IMPROVE risk score is 2  ~cont. Heparin sq.     disposition - pending home health aid which would be set for 8/28

## 2018-08-28 VITALS — WEIGHT: 219.8 LBS

## 2018-08-28 RX ADMIN — Medication 100 MILLIGRAM(S): at 11:50

## 2018-08-28 RX ADMIN — CARBIDOPA AND LEVODOPA 1 TABLET(S): 25; 100 TABLET ORAL at 16:54

## 2018-08-28 RX ADMIN — CARBIDOPA AND LEVODOPA 1 TABLET(S): 25; 100 TABLET ORAL at 06:23

## 2018-08-28 RX ADMIN — CLOPIDOGREL BISULFATE 75 MILLIGRAM(S): 75 TABLET, FILM COATED ORAL at 11:49

## 2018-08-28 RX ADMIN — Medication 81 MILLIGRAM(S): at 11:49

## 2018-08-28 RX ADMIN — Medication 100 MILLIGRAM(S): at 06:23

## 2018-08-28 RX ADMIN — AMLODIPINE BESYLATE 10 MILLIGRAM(S): 2.5 TABLET ORAL at 06:23

## 2018-08-28 RX ADMIN — HEPARIN SODIUM 5000 UNIT(S): 5000 INJECTION INTRAVENOUS; SUBCUTANEOUS at 06:23

## 2018-08-28 RX ADMIN — HEPARIN SODIUM 5000 UNIT(S): 5000 INJECTION INTRAVENOUS; SUBCUTANEOUS at 16:54

## 2018-08-28 NOTE — PROGRESS NOTE ADULT - SUBJECTIVE AND OBJECTIVE BOX
HPI:  89 y/o M PMHx significant for CAD s/p PCI w/ stents, hypertension, hyperlipidemia, CKD stage III, Parkinson's, who was BIBA from home for further evaluation of intermittent chest pressure occurring at rest associated w/ brief periods of dyspnea. The patient describes his pain in intensity as 6/10 without notable radiation. Labs => TnI x 1 (-), BUN/Cr 40/1.81, D-dimer 253. (24 Aug 2018 05:24)      #Review of system- rest of review of systems are negative except as mentioned in HPI    PHYSICAL EXAM:    Vital Signs Last 24 Hrs  T(C): 36.7 (24 Aug 2018 09:30), Max: 36.7 (24 Aug 2018 01:57)  T(F): 98 (24 Aug 2018 09:30), Max: 98 (24 Aug 2018 01:57)  HR: 65 (24 Aug 2018 09:30) (61 - 94)  BP: 124/58 (24 Aug 2018 09:30) (121/63 - 138/65)  BP(mean): --  RR: 17 (24 Aug 2018 09:30) (15 - 18)  SpO2: 94% (24 Aug 2018 09:30) (94% - 99%)    GENERAL: comfortable   HEAD:  Atraumatic, Normocephalic  EYES: EOMI, PERRLA, conjunctiva and sclera clear  HEENT: Moist mucous membranes  NECK: Supple, No JVD  NERVOUS SYSTEM:  Alert & Oriented X3, Motor Strength 5/5 B/L upper and lower extremities; DTRs 2+ intact and symmetric  CHEST/LUNG: Clear to auscultation bilaterally; No rales, rhonchi, wheezing, or rubs  HEART:S1S2 normal, no murmer  ABDOMEN: Soft, Nontender, Nondistended; Bowel sounds present  GENITOURINARY- Voiding, no palpable bladder  EXTREMITIES:  2+ Peripheral Pulses, No clubbing, cyanosis, or edema  MUSCULOSKELTAL- No muscle tenderness, Muscle tone normal, No joint tenderness, no Joint swelling, Joint range of motion-normal  SKIN-no rash, no lesion  PSYCH- Mood stable  LYMPH Node- No palpable lymph node    LABS:                        13.1   8.57  )-----------( 177      ( 24 Aug 2018 04:54 )             38.9     08-24    139  |  110<H>  |  35<H>  ----------------------------<  130<H>  4.0   |  23  |  1.61<H>    Ca    9.1      24 Aug 2018 04:54  Mg     2.2     08-24    TPro  6.8  /  Alb  3.2<L>  /  TBili  0.4  /  DBili  x   /  AST  18  /  ALT  16  /  AlkPhos  69  08-24    PT/INR - ( 23 Aug 2018 22:26 )   PT: 11.4 sec;   INR: 1.05 ratio         PTT - ( 23 Aug 2018 22:26 )  PTT:30.7 sec      CAPILLARY BLOOD GLUCOSE          CARDIAC MARKERS ( 24 Aug 2018 08:43 )  <0.015 ng/mL / x     / x     / x     / x      CARDIAC MARKERS ( 24 Aug 2018 04:54 )  <0.015 ng/mL / x     / x     / x     / x      CARDIAC MARKERS ( 23 Aug 2018 22:26 )  <0.015 ng/mL / x     / 105 U/L / x     / x            Standing medicine  acetaminophen   Tablet 650 milliGRAM(s) Oral every 6 hours PRN  acetaminophen   Tablet. 650 milliGRAM(s) Oral every 6 hours PRN  amLODIPine   Tablet 10 milliGRAM(s) Oral daily  aspirin  chewable 81 milliGRAM(s) Oral daily  atorvastatin 10 milliGRAM(s) Oral at bedtime  carbidopa/levodopa  25/100 1 Tablet(s) Oral three times a day  clopidogrel Tablet 75 milliGRAM(s) Oral daily  docusate sodium 100 milliGRAM(s) Oral three times a day PRN  heparin  Injectable 5000 Unit(s) SubCutaneous every 8 hours  metoprolol succinate  milliGRAM(s) Oral daily  ondansetron Injectable 4 milliGRAM(s) IV Push every 6 hours PRN  senna 2 Tablet(s) Oral at bedtime PRN
Reason for Admission: Chest pain	  History of Present Illness: 	  89 y/o M PMHx significant for CAD s/p PCI w/ stents, hypertension, hyperlipidemia, CKD stage III, Parkinson's, who was BIBA from home for further evaluation of intermittent chest pressure occurring at rest associated w/ brief periods of dyspnea. The patient describes his pain in intensity as 6/10 without notable radiation. Labs => TnI x 1 (-), BUN/Cr 40/1.81, D-dimer 253.     8/25-denies any complaints      PHYSICAL EXAM:    Daily     Daily     ICU Vital Signs Last 24 Hrs  T(C): 36.8 (25 Aug 2018 10:51), Max: 36.8 (25 Aug 2018 10:51)  T(F): 98.3 (25 Aug 2018 10:51), Max: 98.3 (25 Aug 2018 10:51)  HR: 79 (25 Aug 2018 10:51) (67 - 79)  BP: 133/61 (25 Aug 2018 10:51) (127/83 - 135/64)  BP(mean): --  ABP: --  ABP(mean): --  RR: 18 (25 Aug 2018 10:51) (17 - 18)  SpO2: 96% (25 Aug 2018 10:51) (93% - 96%)      Constitutional:NAD  HEENT: Atraumatic, MORGAN, Normal, No congestion  Respiratory: Breath Sounds normal, no rhonchi/wheeze  Cardiovascular: N S1S2;   Gastrointestinal: Abdomen soft, non tender, Bowel Ssounds present  Extremities: No edema, peripheral pulses present  Neurological: AAO x 3, no gross focal motor deficits  s   Musculoskeletal: non tender  Breasts: Deferred  Genitourinary: deferred  Rectal: Deferred                          13.1   8.57  )-----------( 177      ( 24 Aug 2018 04:54 )             38.9       CBC Full  -  ( 24 Aug 2018 04:54 )  WBC Count : 8.57 K/uL  Hemoglobin : 13.1 g/dL  Hematocrit : 38.9 %  Platelet Count - Automated : 177 K/uL  Mean Cell Volume : 90.9 fl  Mean Cell Hemoglobin : 30.6 pg  Mean Cell Hemoglobin Concentration : 33.7 gm/dL  Auto Neutrophil # : x  Auto Lymphocyte # : x  Auto Monocyte # : x  Auto Eosinophil # : x  Auto Basophil # : x  Auto Neutrophil % : x  Auto Lymphocyte % : x  Auto Monocyte % : x  Auto Eosinophil % : x  Auto Basophil % : x      08-24    139  |  110<H>  |  35<H>  ----------------------------<  130<H>  4.0   |  23  |  1.61<H>    Ca    9.1      24 Aug 2018 04:54  Mg     2.2     08-24    TPro  6.8  /  Alb  3.2<L>  /  TBili  0.4  /  DBili  x   /  AST  18  /  ALT  16  /  AlkPhos  69  08-24      LIVER FUNCTIONS - ( 24 Aug 2018 04:54 )  Alb: 3.2 g/dL / Pro: 6.8 gm/dL / ALK PHOS: 69 U/L / ALT: 16 U/L / AST: 18 U/L / GGT: x             PT/INR - ( 23 Aug 2018 22:26 )   PT: 11.4 sec;   INR: 1.05 ratio         PTT - ( 23 Aug 2018 22:26 )  PTT:30.7 sec    CARDIAC MARKERS ( 24 Aug 2018 08:43 )  <0.015 ng/mL / x     / x     / x     / x      CARDIAC MARKERS ( 24 Aug 2018 04:54 )  <0.015 ng/mL / x     / x     / x     / x      CARDIAC MARKERS ( 23 Aug 2018 22:26 )  <0.015 ng/mL / x     / 105 U/L / x     / x                    MEDICATIONS  (STANDING):  amLODIPine   Tablet 10 milliGRAM(s) Oral daily  aspirin  chewable 81 milliGRAM(s) Oral daily  atorvastatin 10 milliGRAM(s) Oral at bedtime  carbidopa/levodopa  25/100 1 Tablet(s) Oral three times a day  clopidogrel Tablet 75 milliGRAM(s) Oral daily  heparin  Injectable 5000 Unit(s) SubCutaneous every 8 hours  metoprolol succinate  milliGRAM(s) Oral daily
S: Eval atypical dyspnea and atypical CP, ASHD, Arrhythmias, VHD, and HTN  Rev notes, VS, Meds, Labs  Spoke to neuro (Barber) and H-MD (Alethea)  Denies CP and no SOB. No palps. Says no recall of confusion during the night. I spoke to Nurse and he has variable confusion vs clear MS during the episode.  Had CT neg and neuro MD feels this is part of Parkinson's. It apparently has happened before.  No other Sx on ROS.    O: NAD. Bed at 30 degrees.  T 98.1  HR 89 RRR, /65  R 14.  No diaph  No vis JVP  clear ant  RRR  abd non tender  no edema no calf tend  Alert. Memory for event is  poor  Calm  Monitor: Apacing.  K 3.9  Cr 1.56  CT head atrophy.
S: Rev notes, VS, Meds, LAbs  Rev Dx: ASHD, CP, SOB, HTN,VHD  Says no CP or SOB. No palps or other symptoms. ROS neg\    O: NAD R14 in bed at 30 degrees. About to eat breakfast.  T 98.5  HR 77  BP  149/69  No diaph  No vis JVP  Clear anterior lungs  RRR  abd non tender  No calf tend or edema  Alert  Seems to have memory problems. Knows me and ans ques appropriately    Lexiscan MPI Normal and no ischemia. EF 74%  No new labs. No echo
S: Review Hx, ASHD, Sx (atypical CP and SOB), other med issues, HTN (Hx OH), VHD  Patient in nuclear med for Lexiscan.  Plan is conservative. Has renal insufficiency and cath and contrast may be a risk to kidney function. In the past he has been treated for CP with SL NT and I would advise the same if we can do it. The episodic SOB did not have a pattern suggesting CVD cause.  Lexiscan was ordered in hope it is negative and then symptoms would not require a CVD Tx.  Gets agitated and hallucinates at night (corroborated by his room mate). Neuro ahs seen and attributed it to Parkinson's.    O: Afebrile T 97.5  HR 91 and /76  No new labs or Phys exam.  Lexiscan pending
S: all dx reviewed as per dictated report. Rev VS, notes, meds, labs, moniotr.  Says breathing better. No SOB episodes. Says had chest discomfort moving down for lung scan. No specific details.  V/Q was neg. Fixates on bowel functions.  EP note indicates PPM ok but near EOL and will be monitored for change. No tachyarrhythmias in Hx. Monitor shows VPC and A pacing.    O: T 97.2  HR 68   /83  R 14  NAD  No diaph  No vis JVP  Lungs clear ant  RRR 2/6 systolic m  abd non tender  no edema.  Alert and appropriate with me.   Memory for details and descriptions of Sx not always clear.  V/Q neg  Troponin neg x3  PPM ok
· Subjective and Objective: 	  Reason for Admission: Chest pain	  History of Present Illness: 	  89 y/o M PMHx significant for CAD s/p PCI w/ stents, hypertension, hyperlipidemia, CKD stage III, Parkinson's, who was BIBA from home for further evaluation of intermittent chest pressure occurring at rest associated w/ brief periods of dyspnea. The patient describes his pain in intensity as 6/10 without notable radiation. Labs => TnI x 1 (-), BUN/Cr 40/1.81, D-dimer 253.     8/25-denies any complaints  8/26-no complaints  constitutional:NAD  HEENT: Atraumatic, MORGAN, Normal, No congestion  Respiratory: Breath Sounds normal, no rhonchi/wheeze  Cardiovascular: N S1S2;   Gastrointestinal: Abdomen soft, non tender, Bowel Ssounds present  Extremities: No edema, peripheral pulses present  Neurological: AAO x 3, no gross focal motor deficits  s   Musculoskeletal: non tender  Breasts: Deferred  Genitourinary: deferred      PHYSICAL EXAM:    Daily     Daily     ICU Vital Signs Last 24 Hrs  T(C): 36.7 (27 Aug 2018 16:55), Max: 36.7 (27 Aug 2018 16:55)  T(F): 98 (27 Aug 2018 16:55), Max: 98 (27 Aug 2018 16:55)  HR: 78 (27 Aug 2018 16:55) (78 - 91)  BP: 147/68 (27 Aug 2018 16:55) (147/68 - 152/76)  BP(mean): --  ABP: --  ABP(mean): --  RR: 18 (27 Aug 2018 16:55) (18 - 18)  SpO2: 95% (27 Aug 2018 16:55) (95% - 95%)                                13.7   8.62  )-----------( 179      ( 26 Aug 2018 06:24 )             40.7       CBC Full  -  ( 26 Aug 2018 06:24 )  WBC Count : 8.62 K/uL  Hemoglobin : 13.7 g/dL  Hematocrit : 40.7 %  Platelet Count - Automated : 179 K/uL  Mean Cell Volume : 90.4 fl  Mean Cell Hemoglobin : 30.4 pg  Mean Cell Hemoglobin Concentration : 33.7 gm/dL  Auto Neutrophil # : 6.88 K/uL  Auto Lymphocyte # : 0.85 K/uL  Auto Monocyte # : 0.68 K/uL  Auto Eosinophil # : 0.09 K/uL  Auto Basophil # : 0.06 K/uL  Auto Neutrophil % : 79.8 %  Auto Lymphocyte % : 9.9 %  Auto Monocyte % : 7.9 %  Auto Eosinophil % : 1.0 %  Auto Basophil % : 0.7 %      08-26    144  |  112<H>  |  30<H>  ----------------------------<  142<H>  3.9   |  24  |  1.56<H>    Ca    9.0      26 Aug 2018 06:24                              MEDICATIONS  (STANDING):  amLODIPine   Tablet 10 milliGRAM(s) Oral daily  aspirin  chewable 81 milliGRAM(s) Oral daily  atorvastatin 10 milliGRAM(s) Oral at bedtime  carbidopa/levodopa  25/100 1 Tablet(s) Oral three times a day  clopidogrel Tablet 75 milliGRAM(s) Oral daily  heparin  Injectable 5000 Unit(s) SubCutaneous every 8 hours  metoprolol succinate  milliGRAM(s) Oral daily

## 2018-08-28 NOTE — PROGRESS NOTE ADULT - ASSESSMENT
A: Based on data available I cannot offer explanation or treatment for the atypical CP   or atypical SOB except to offer reassurance.  Since he does not come to my office I will see prn if in hospital  All meds and management is by an MD who does home care and any changes silver go to that MD

## 2018-09-01 DIAGNOSIS — M10.9 GOUT, UNSPECIFIED: ICD-10-CM

## 2018-09-01 DIAGNOSIS — I25.10 ATHEROSCLEROTIC HEART DISEASE OF NATIVE CORONARY ARTERY WITHOUT ANGINA PECTORIS: ICD-10-CM

## 2018-09-01 DIAGNOSIS — I12.9 HYPERTENSIVE CHRONIC KIDNEY DISEASE WITH STAGE 1 THROUGH STAGE 4 CHRONIC KIDNEY DISEASE, OR UNSPECIFIED CHRONIC KIDNEY DISEASE: ICD-10-CM

## 2018-09-01 DIAGNOSIS — E06.3 AUTOIMMUNE THYROIDITIS: ICD-10-CM

## 2018-09-01 DIAGNOSIS — G93.40 ENCEPHALOPATHY, UNSPECIFIED: ICD-10-CM

## 2018-09-01 DIAGNOSIS — E78.5 HYPERLIPIDEMIA, UNSPECIFIED: ICD-10-CM

## 2018-09-01 DIAGNOSIS — R07.9 CHEST PAIN, UNSPECIFIED: ICD-10-CM

## 2018-09-01 DIAGNOSIS — E11.22 TYPE 2 DIABETES MELLITUS WITH DIABETIC CHRONIC KIDNEY DISEASE: ICD-10-CM

## 2018-09-01 DIAGNOSIS — I44.0 ATRIOVENTRICULAR BLOCK, FIRST DEGREE: ICD-10-CM

## 2018-09-01 DIAGNOSIS — G20 PARKINSON'S DISEASE: ICD-10-CM

## 2018-09-01 DIAGNOSIS — N18.3 CHRONIC KIDNEY DISEASE, STAGE 3 (MODERATE): ICD-10-CM

## 2018-12-04 ENCOUNTER — APPOINTMENT (OUTPATIENT)
Dept: ELECTROPHYSIOLOGY | Facility: CLINIC | Age: 83
End: 2018-12-04
Payer: MEDICARE

## 2018-12-04 VITALS
HEIGHT: 70 IN | DIASTOLIC BLOOD PRESSURE: 62 MMHG | WEIGHT: 185 LBS | SYSTOLIC BLOOD PRESSURE: 105 MMHG | BODY MASS INDEX: 26.48 KG/M2 | HEART RATE: 66 BPM

## 2018-12-04 DIAGNOSIS — Z95.0 PRESENCE OF CARDIAC PACEMAKER: ICD-10-CM

## 2018-12-04 DIAGNOSIS — I44.30 UNSPECIFIED ATRIOVENTRICULAR BLOCK: ICD-10-CM

## 2018-12-04 PROCEDURE — 93280 PM DEVICE PROGR EVAL DUAL: CPT

## 2019-01-14 ENCOUNTER — INPATIENT (INPATIENT)
Facility: HOSPITAL | Age: 84
LOS: 2 days | Discharge: HOME HEALTH | End: 2019-01-17
Attending: FAMILY MEDICINE | Admitting: FAMILY MEDICINE
Payer: COMMERCIAL

## 2019-01-14 VITALS
RESPIRATION RATE: 16 BRPM | TEMPERATURE: 98 F | OXYGEN SATURATION: 100 % | SYSTOLIC BLOOD PRESSURE: 116 MMHG | WEIGHT: 184.97 LBS | HEIGHT: 70 IN | DIASTOLIC BLOOD PRESSURE: 84 MMHG | HEART RATE: 89 BPM

## 2019-01-14 DIAGNOSIS — Z98.61 CORONARY ANGIOPLASTY STATUS: Chronic | ICD-10-CM

## 2019-01-14 DIAGNOSIS — Z95.0 PRESENCE OF CARDIAC PACEMAKER: Chronic | ICD-10-CM

## 2019-01-14 LAB
ALBUMIN SERPL ELPH-MCNC: 3.3 G/DL — SIGNIFICANT CHANGE UP (ref 3.3–5)
ALP SERPL-CCNC: 97 U/L — SIGNIFICANT CHANGE UP (ref 40–120)
ALT FLD-CCNC: 13 U/L — SIGNIFICANT CHANGE UP (ref 12–78)
ANION GAP SERPL CALC-SCNC: 9 MMOL/L — SIGNIFICANT CHANGE UP (ref 5–17)
APTT BLD: 33.2 SEC — SIGNIFICANT CHANGE UP (ref 27.5–36.3)
AST SERPL-CCNC: 18 U/L — SIGNIFICANT CHANGE UP (ref 15–37)
BASOPHILS # BLD AUTO: 0.08 K/UL — SIGNIFICANT CHANGE UP (ref 0–0.2)
BASOPHILS NFR BLD AUTO: 1 % — SIGNIFICANT CHANGE UP (ref 0–2)
BILIRUB SERPL-MCNC: 0.3 MG/DL — SIGNIFICANT CHANGE UP (ref 0.2–1.2)
BUN SERPL-MCNC: 38 MG/DL — HIGH (ref 7–23)
CALCIUM SERPL-MCNC: 9.2 MG/DL — SIGNIFICANT CHANGE UP (ref 8.5–10.1)
CHLORIDE SERPL-SCNC: 105 MMOL/L — SIGNIFICANT CHANGE UP (ref 96–108)
CO2 SERPL-SCNC: 24 MMOL/L — SIGNIFICANT CHANGE UP (ref 22–31)
CREAT SERPL-MCNC: 2.16 MG/DL — HIGH (ref 0.5–1.3)
EOSINOPHIL # BLD AUTO: 0.26 K/UL — SIGNIFICANT CHANGE UP (ref 0–0.5)
EOSINOPHIL NFR BLD AUTO: 3.3 % — SIGNIFICANT CHANGE UP (ref 0–6)
GLUCOSE SERPL-MCNC: 212 MG/DL — HIGH (ref 70–99)
HCT VFR BLD CALC: 43.1 % — SIGNIFICANT CHANGE UP (ref 39–50)
HGB BLD-MCNC: 14.1 G/DL — SIGNIFICANT CHANGE UP (ref 13–17)
IMM GRANULOCYTES NFR BLD AUTO: 0.5 % — SIGNIFICANT CHANGE UP (ref 0–1.5)
INR BLD: 1.01 RATIO — SIGNIFICANT CHANGE UP (ref 0.88–1.16)
LYMPHOCYTES # BLD AUTO: 1.09 K/UL — SIGNIFICANT CHANGE UP (ref 1–3.3)
LYMPHOCYTES # BLD AUTO: 13.7 % — SIGNIFICANT CHANGE UP (ref 13–44)
MCHC RBC-ENTMCNC: 29.3 PG — SIGNIFICANT CHANGE UP (ref 27–34)
MCHC RBC-ENTMCNC: 32.7 GM/DL — SIGNIFICANT CHANGE UP (ref 32–36)
MCV RBC AUTO: 89.6 FL — SIGNIFICANT CHANGE UP (ref 80–100)
MONOCYTES # BLD AUTO: 0.69 K/UL — SIGNIFICANT CHANGE UP (ref 0–0.9)
MONOCYTES NFR BLD AUTO: 8.7 % — SIGNIFICANT CHANGE UP (ref 2–14)
NEUTROPHILS # BLD AUTO: 5.8 K/UL — SIGNIFICANT CHANGE UP (ref 1.8–7.4)
NEUTROPHILS NFR BLD AUTO: 72.8 % — SIGNIFICANT CHANGE UP (ref 43–77)
NT-PROBNP SERPL-SCNC: 162 PG/ML — SIGNIFICANT CHANGE UP (ref 0–450)
PLATELET # BLD AUTO: 194 K/UL — SIGNIFICANT CHANGE UP (ref 150–400)
POTASSIUM SERPL-MCNC: 4.3 MMOL/L — SIGNIFICANT CHANGE UP (ref 3.5–5.3)
POTASSIUM SERPL-SCNC: 4.3 MMOL/L — SIGNIFICANT CHANGE UP (ref 3.5–5.3)
PROT SERPL-MCNC: 7.1 GM/DL — SIGNIFICANT CHANGE UP (ref 6–8.3)
PROTHROM AB SERPL-ACNC: 11.2 SEC — SIGNIFICANT CHANGE UP (ref 10–12.9)
RBC # BLD: 4.81 M/UL — SIGNIFICANT CHANGE UP (ref 4.2–5.8)
RBC # FLD: 13.8 % — SIGNIFICANT CHANGE UP (ref 10.3–14.5)
SODIUM SERPL-SCNC: 138 MMOL/L — SIGNIFICANT CHANGE UP (ref 135–145)
TROPONIN I SERPL-MCNC: <0.015 NG/ML — SIGNIFICANT CHANGE UP (ref 0.01–0.04)
WBC # BLD: 7.96 K/UL — SIGNIFICANT CHANGE UP (ref 3.8–10.5)
WBC # FLD AUTO: 7.96 K/UL — SIGNIFICANT CHANGE UP (ref 3.8–10.5)

## 2019-01-14 PROCEDURE — 99285 EMERGENCY DEPT VISIT HI MDM: CPT

## 2019-01-14 PROCEDURE — 71045 X-RAY EXAM CHEST 1 VIEW: CPT | Mod: 26

## 2019-01-14 PROCEDURE — 93010 ELECTROCARDIOGRAM REPORT: CPT

## 2019-01-14 RX ORDER — DEXTROSE 50 % IN WATER 50 %
12.5 SYRINGE (ML) INTRAVENOUS ONCE
Qty: 0 | Refills: 0 | Status: DISCONTINUED | OUTPATIENT
Start: 2019-01-14 | End: 2019-01-17

## 2019-01-14 RX ORDER — GLUCAGON INJECTION, SOLUTION 0.5 MG/.1ML
1 INJECTION, SOLUTION SUBCUTANEOUS ONCE
Qty: 0 | Refills: 0 | Status: DISCONTINUED | OUTPATIENT
Start: 2019-01-14 | End: 2019-01-17

## 2019-01-14 RX ORDER — SODIUM CHLORIDE 9 MG/ML
1000 INJECTION INTRAMUSCULAR; INTRAVENOUS; SUBCUTANEOUS
Qty: 0 | Refills: 0 | Status: DISCONTINUED | OUTPATIENT
Start: 2019-01-14 | End: 2019-01-16

## 2019-01-14 RX ORDER — AMLODIPINE BESYLATE 2.5 MG/1
10 TABLET ORAL DAILY
Qty: 0 | Refills: 0 | Status: DISCONTINUED | OUTPATIENT
Start: 2019-01-14 | End: 2019-01-17

## 2019-01-14 RX ORDER — DEXTROSE 50 % IN WATER 50 %
15 SYRINGE (ML) INTRAVENOUS ONCE
Qty: 0 | Refills: 0 | Status: DISCONTINUED | OUTPATIENT
Start: 2019-01-14 | End: 2019-01-17

## 2019-01-14 RX ORDER — LACTOBACILLUS ACIDOPHILUS 100MM CELL
1 CAPSULE ORAL DAILY
Qty: 0 | Refills: 0 | Status: DISCONTINUED | OUTPATIENT
Start: 2019-01-14 | End: 2019-01-17

## 2019-01-14 RX ORDER — INSULIN LISPRO 100/ML
VIAL (ML) SUBCUTANEOUS
Qty: 0 | Refills: 0 | Status: DISCONTINUED | OUTPATIENT
Start: 2019-01-14 | End: 2019-01-17

## 2019-01-14 RX ORDER — CHOLECALCIFEROL (VITAMIN D3) 125 MCG
1000 CAPSULE ORAL THREE TIMES A DAY
Qty: 0 | Refills: 0 | Status: DISCONTINUED | OUTPATIENT
Start: 2019-01-14 | End: 2019-01-17

## 2019-01-14 RX ORDER — DEXTROSE 50 % IN WATER 50 %
25 SYRINGE (ML) INTRAVENOUS ONCE
Qty: 0 | Refills: 0 | Status: DISCONTINUED | OUTPATIENT
Start: 2019-01-14 | End: 2019-01-17

## 2019-01-14 RX ORDER — ATORVASTATIN CALCIUM 80 MG/1
10 TABLET, FILM COATED ORAL AT BEDTIME
Qty: 0 | Refills: 0 | Status: DISCONTINUED | OUTPATIENT
Start: 2019-01-14 | End: 2019-01-17

## 2019-01-14 RX ORDER — ASPIRIN/CALCIUM CARB/MAGNESIUM 324 MG
81 TABLET ORAL DAILY
Qty: 0 | Refills: 0 | Status: DISCONTINUED | OUTPATIENT
Start: 2019-01-14 | End: 2019-01-17

## 2019-01-14 RX ORDER — HEPARIN SODIUM 5000 [USP'U]/ML
5000 INJECTION INTRAVENOUS; SUBCUTANEOUS EVERY 8 HOURS
Qty: 0 | Refills: 0 | Status: DISCONTINUED | OUTPATIENT
Start: 2019-01-14 | End: 2019-01-17

## 2019-01-14 RX ORDER — CLOPIDOGREL BISULFATE 75 MG/1
75 TABLET, FILM COATED ORAL DAILY
Qty: 0 | Refills: 0 | Status: DISCONTINUED | OUTPATIENT
Start: 2019-01-14 | End: 2019-01-17

## 2019-01-14 RX ORDER — ACETAMINOPHEN 500 MG
650 TABLET ORAL EVERY 6 HOURS
Qty: 0 | Refills: 0 | Status: DISCONTINUED | OUTPATIENT
Start: 2019-01-14 | End: 2019-01-17

## 2019-01-14 RX ORDER — METOPROLOL TARTRATE 50 MG
100 TABLET ORAL DAILY
Qty: 0 | Refills: 0 | Status: DISCONTINUED | OUTPATIENT
Start: 2019-01-14 | End: 2019-01-17

## 2019-01-14 RX ORDER — SODIUM CHLORIDE 9 MG/ML
1000 INJECTION, SOLUTION INTRAVENOUS
Qty: 0 | Refills: 0 | Status: DISCONTINUED | OUTPATIENT
Start: 2019-01-14 | End: 2019-01-17

## 2019-01-14 RX ORDER — CARBIDOPA AND LEVODOPA 25; 100 MG/1; MG/1
1 TABLET ORAL THREE TIMES A DAY
Qty: 0 | Refills: 0 | Status: DISCONTINUED | OUTPATIENT
Start: 2019-01-14 | End: 2019-01-17

## 2019-01-14 RX ORDER — FEBUXOSTAT 40 MG/1
40 TABLET ORAL DAILY
Qty: 0 | Refills: 0 | Status: DISCONTINUED | OUTPATIENT
Start: 2019-01-14 | End: 2019-01-17

## 2019-01-14 NOTE — ED PROVIDER NOTE - PROGRESS NOTE DETAILS
89 y/o M presents with CP.  Pt reports CP moderate in intensity since last night, pain is intermittent and worse with exertion, associated with SOB. Pt administered nitro by EMS with improvement of sx. Denies fever/chills, n/v/d, abd pain, cough, or other complaints at this time. -Sameer De La Cruz PA-C Case discussed with Dr. duffy, will admit pt to r/o ACS. -Sameer De La Cruz PA-C

## 2019-01-14 NOTE — ED ADULT NURSE NOTE - OBJECTIVE STATEMENT
Pt presents to ER c/o non radiating midsternal chest discomfort and SOB/ASHFORD. Onset of symptoms began last night while at home and have been continuous. Tachypneic, equal b/l chest expansion. AO x 3 oriented to baseline, Neuro intact. Hx pacemaker/stents

## 2019-01-14 NOTE — ED PROVIDER NOTE - OBJECTIVE STATEMENT
89y/o F w/ PMHx of CKD, HF, Parkinson's, DM, gout, HTN (on amlodipine and Toprol) s/p PTC w/ stent x 2, pacemaker BIBA w/ sudden onset of chest pain at home. Pt took 325mg ASA and SL Nitro spray en route which alleviated much of his chest pain on arrival to the ED.  Per EMS, pt noted to have elevated BP prior to Nitro administration and not in distress. Pt states CP was intermitted and started last night.  Pt denies SOB at rest and no orthopnea.  Pt gets SOB when ambulation after taking 2 step w/ his walker.  Denies HA, dizziness, cough, N/V/D, fever or chills.  O2sat 88% on room air, does not use supplemental O2 at home. Pt on 81mg ASA daily and plavix. PMD: Dr. Paresh Luque 89y/o F w/ PMHx of CKD, HF, Parkinson's, DM, gout, HTN (on amlodipine and Toprol) s/p PTC w/ stent x 2, pacemaker BIBA w/ sudden onset of chest pain at home with exertional dsypnea. Pt took 325mg ASA and SL Nitro spray en route which alleviated much of his chest pain on arrival to the ED.  Per EMS, pt noted to have elevated BP prior to Nitro administration and not in distress. Pt states CP was intermitted and started last night.  Pt denies SOB at rest and no orthopnea.  Pt gets SOB when ambulation after taking 2 step w/ his walker.  Denies HA, dizziness, cough, N/V/D, fever or chills.  O2sat 88% on room air, does not use supplemental O2 at home. Pt on 81mg ASA daily and plavix. PMD: Dr. Paresh Luque

## 2019-01-14 NOTE — H&P ADULT - NSHPPHYSICALEXAM_GEN_ALL_CORE
Vital Signs Last 24 Hrs  T(C): 36.4 (14 Jan 2019 23:36), Max: 36.6 (14 Jan 2019 20:06)  T(F): 97.6 (14 Jan 2019 23:36), Max: 97.8 (14 Jan 2019 20:06)  HR: 66 (14 Jan 2019 23:36) (66 - 89)  BP: 119/57 (14 Jan 2019 23:36) (116/84 - 119/57)  RR: 17 (14 Jan 2019 23:36) (16 - 17)  SpO2: 95% (14 Jan 2019 23:36) (95% - 100%)

## 2019-01-14 NOTE — ED ADULT TRIAGE NOTE - CHIEF COMPLAINT QUOTE
Sudden onset of chest pain at home. HX stents and CHF as per EMS. Patient took 324 ASA PTA and 1 dose SL Nitro spray given to EMS. On arrival to ED patient states much relief of chest pain, no distress noted. EMS noted BP was elevated prior to Nitro administration.

## 2019-01-14 NOTE — ED ADULT NURSE NOTE - NSIMPLEMENTINTERV_GEN_ALL_ED
Implemented All Fall with Harm Risk Interventions:  [place] to call system. Call bell, personal items and telephone within reach. Instruct patient to call for assistance. Room bathroom lighting operational. Non-slip footwear when patient is off stretcher. Physically safe environment: no spills, clutter or unnecessary equipment. Stretcher in lowest position, wheels locked, appropriate side rails in place. Provide visual cue, wrist band, yellow gown, etc. Monitor gait and stability. Monitor for mental status changes and reorient to person, place, and time. Review medications for side effects contributing to fall risk. Reinforce activity limits and safety measures with patient and family. Provide visual clues: red socks.

## 2019-01-14 NOTE — ED PROVIDER NOTE - MEDICAL DECISION MAKING DETAILS
89y/o F w/ PMHx of CKD, HF, Parkinson's, DM, gout, HTN (on amlodipine and Toprol) s/p PTC w/ stent x 2, pacemaker BIBA w/ sudden onset of chest pain at home.  Resolved en route to ED w/ ASA and Nitro.  Exam is non-focal. 89y/o F w/ PMHx of CKD, HF, Parkinson's, DM, gout, HTN (on amlodipine and Toprol) s/p PTC w/ stent x 2, pacemaker BIBA w/ sudden onset of chest pain at home.  Resolved en route to ED w/ ASA and Nitro.  Concern for ACS/CHF.  Plan: labs, CXR and admit.

## 2019-01-14 NOTE — H&P ADULT - ASSESSMENT
89 year old male pt with CAD with chest pain    -Admit to Telemetry    #Chest pain  DDX:ACS, musculoskeletal pain, Pleural effusion, CHF  -trend troponins  -Asa, statin, beta-blocker    #Dyspnea on exertion  -CHF vs Pleural effusion  -heart failure unlikely-   -am echo  -oxygen prn    #Acute on chronic kidney disease  -baseline Cr 1.3-1.5 Currently 2.1  -trend bmp  -gentle hydration    #HTN  -stable  -c/w amlodipine    #HLD  -f/u lipid panel  -c/w lipitor    #Gout  -c/w uloric 40 mg daily    #Parkinson's  -c/w carbidopa/ levodopa    IMPROVE VTE Individual Risk Assessment    RISK                                                                Points    [  ] Previous VTE                                                  3    [  ] Thrombophilia                                               2    [  ] Lower limb paralysis                                      2        (unable to hold up >15 seconds)      [  ] Current Cancer                                              2         (within 6 months)    [  ] Immobilization > 24 hrs                                1    [  ] ICU/CCU stay > 24 hours                              1    [  ] Age > 60                                                      1    IMPROVE VTE Score __2_______ 89 year old male pt with CAD with chest pain    -Admit to Telemetry    #Chest pain  DDX:ACS, musculoskeletal pain, Pleural effusion, CHF  -trend troponins  -Asa, statin, beta-blocker    #Dyspnea on exertion  -CHF vs Pleural effusion  -heart failure unlikely-   -am echo  -oxygen prn    #Acute on chronic kidney disease  -baseline Cr 1.3-1.5 Currently 2.1  -trend bmp  -gentle hydration    #HTN  -stable  -c/w amlodipine    #HLD  -f/u lipid panel  -c/w lipitor    #Gout  -c/w uloric 40 mg daily    #Parkinson's  -c/w carbidopa/ levodopa    IMPROVE VTE Individual Risk Assessment    RISK                                                                Points    [  ] Previous VTE                                                  3    [  ] Thrombophilia                                               2    [  ] Lower limb paralysis                                      2        (unable to hold up >15 seconds)      [  ] Current Cancer                                              2         (within 6 months)    [  ] Immobilization > 24 hrs                                1    [  ] ICU/CCU stay > 24 hours                              1    [  ] Age > 60                                                      1    IMPROVE VTE Score __2_______  ~cont. Heparin sq

## 2019-01-14 NOTE — ED PROVIDER NOTE - NS ED ROS FT
Constitutional: No fever or chills  Eyes: No visual changes  HEENT: No throat pain  CV: No chest pain  Resp: No SOB no cough  GI: No abd pain, nausea or vomiting  : No dysuria  MSK: No musculoskeletal pain  Skin: No rash  Neuro: No headache Constitutional: No fever or chills  Eyes: No visual changes  HEENT: No throat pain  CV: + chest pain  Resp: + exertional dyspnea no cough  GI: No abd pain, nausea or vomiting  : No dysuria  MSK: No musculoskeletal pain  Skin: No rash  Neuro: No headache

## 2019-01-14 NOTE — H&P ADULT - ATTENDING COMMENTS
89 y/o M PMHx as noted above admitted for further evaluation of acute onset of chsest pain/unstable angina which awoke patient while at home.    1)Chest pain/Unstable Angina  ~admit to Telemetry  ~serial Jin/EKGs  ~cont. ASA  ~cont. statin therapy as above  ~f/u w/ Cardiology in the am    2)Dyspnea  ~f/u 2DECHO in the am  ~cont. supplemental O2    3)Acute on chronic kidney disease  ~cont. management as outlines above    4)Hypertension  ~Cont. Amlodipine    5)Hyperlipidemia  ~cont. statin therapy w/ Atorvastatin    6)Gout  ~cont. management as above    7)Parkinson's disease  ~cont. Carbidopa/Levodopa    8)Vte ppx  ~IMPROVE VTE Risk Score is 2   ~cont. Heparin sq

## 2019-01-14 NOTE — H&P ADULT - RS GEN PE MLT RESP DETAILS PC
respirations non-labored/good air movement/airway patent/no wheezes/clear to auscultation bilaterally/no rales

## 2019-01-15 LAB
ALBUMIN SERPL ELPH-MCNC: 3 G/DL — LOW (ref 3.3–5)
ALP SERPL-CCNC: 81 U/L — SIGNIFICANT CHANGE UP (ref 40–120)
ALT FLD-CCNC: 15 U/L — SIGNIFICANT CHANGE UP (ref 12–78)
ANION GAP SERPL CALC-SCNC: 7 MMOL/L — SIGNIFICANT CHANGE UP (ref 5–17)
AST SERPL-CCNC: 16 U/L — SIGNIFICANT CHANGE UP (ref 15–37)
BASOPHILS # BLD AUTO: 0.07 K/UL — SIGNIFICANT CHANGE UP (ref 0–0.2)
BASOPHILS NFR BLD AUTO: 1 % — SIGNIFICANT CHANGE UP (ref 0–2)
BILIRUB SERPL-MCNC: 0.4 MG/DL — SIGNIFICANT CHANGE UP (ref 0.2–1.2)
BUN SERPL-MCNC: 35 MG/DL — HIGH (ref 7–23)
CALCIUM SERPL-MCNC: 8.8 MG/DL — SIGNIFICANT CHANGE UP (ref 8.5–10.1)
CHLORIDE SERPL-SCNC: 108 MMOL/L — SIGNIFICANT CHANGE UP (ref 96–108)
CO2 SERPL-SCNC: 25 MMOL/L — SIGNIFICANT CHANGE UP (ref 22–31)
CREAT SERPL-MCNC: 1.83 MG/DL — HIGH (ref 0.5–1.3)
EOSINOPHIL # BLD AUTO: 0.26 K/UL — SIGNIFICANT CHANGE UP (ref 0–0.5)
EOSINOPHIL NFR BLD AUTO: 3.8 % — SIGNIFICANT CHANGE UP (ref 0–6)
GLUCOSE BLDC GLUCOMTR-MCNC: 131 MG/DL — HIGH (ref 70–99)
GLUCOSE BLDC GLUCOMTR-MCNC: 134 MG/DL — HIGH (ref 70–99)
GLUCOSE BLDC GLUCOMTR-MCNC: 138 MG/DL — HIGH (ref 70–99)
GLUCOSE BLDC GLUCOMTR-MCNC: 145 MG/DL — HIGH (ref 70–99)
GLUCOSE BLDC GLUCOMTR-MCNC: 158 MG/DL — HIGH (ref 70–99)
GLUCOSE SERPL-MCNC: 130 MG/DL — HIGH (ref 70–99)
HBA1C BLD-MCNC: 7.5 % — HIGH (ref 4–5.6)
HCT VFR BLD CALC: 39.4 % — SIGNIFICANT CHANGE UP (ref 39–50)
HGB BLD-MCNC: 13 G/DL — SIGNIFICANT CHANGE UP (ref 13–17)
IMM GRANULOCYTES NFR BLD AUTO: 0.6 % — SIGNIFICANT CHANGE UP (ref 0–1.5)
LIDOCAIN IGE QN: 187 U/L — SIGNIFICANT CHANGE UP (ref 73–393)
LYMPHOCYTES # BLD AUTO: 1.16 K/UL — SIGNIFICANT CHANGE UP (ref 1–3.3)
LYMPHOCYTES # BLD AUTO: 17 % — SIGNIFICANT CHANGE UP (ref 13–44)
MCHC RBC-ENTMCNC: 29 PG — SIGNIFICANT CHANGE UP (ref 27–34)
MCHC RBC-ENTMCNC: 33 GM/DL — SIGNIFICANT CHANGE UP (ref 32–36)
MCV RBC AUTO: 87.9 FL — SIGNIFICANT CHANGE UP (ref 80–100)
MONOCYTES # BLD AUTO: 0.61 K/UL — SIGNIFICANT CHANGE UP (ref 0–0.9)
MONOCYTES NFR BLD AUTO: 9 % — SIGNIFICANT CHANGE UP (ref 2–14)
NEUTROPHILS # BLD AUTO: 4.67 K/UL — SIGNIFICANT CHANGE UP (ref 1.8–7.4)
NEUTROPHILS NFR BLD AUTO: 68.6 % — SIGNIFICANT CHANGE UP (ref 43–77)
NRBC # BLD: 0 /100 WBCS — SIGNIFICANT CHANGE UP (ref 0–0)
PLATELET # BLD AUTO: 170 K/UL — SIGNIFICANT CHANGE UP (ref 150–400)
POTASSIUM SERPL-MCNC: 3.9 MMOL/L — SIGNIFICANT CHANGE UP (ref 3.5–5.3)
POTASSIUM SERPL-SCNC: 3.9 MMOL/L — SIGNIFICANT CHANGE UP (ref 3.5–5.3)
PROT SERPL-MCNC: 6.2 GM/DL — SIGNIFICANT CHANGE UP (ref 6–8.3)
RBC # BLD: 4.48 M/UL — SIGNIFICANT CHANGE UP (ref 4.2–5.8)
RBC # FLD: 13.9 % — SIGNIFICANT CHANGE UP (ref 10.3–14.5)
SODIUM SERPL-SCNC: 140 MMOL/L — SIGNIFICANT CHANGE UP (ref 135–145)
TROPONIN I SERPL-MCNC: <0.015 NG/ML — SIGNIFICANT CHANGE UP (ref 0.01–0.04)
WBC # BLD: 6.81 K/UL — SIGNIFICANT CHANGE UP (ref 3.8–10.5)
WBC # FLD AUTO: 6.81 K/UL — SIGNIFICANT CHANGE UP (ref 3.8–10.5)

## 2019-01-15 PROCEDURE — 93306 TTE W/DOPPLER COMPLETE: CPT | Mod: 26

## 2019-01-15 PROCEDURE — 93010 ELECTROCARDIOGRAM REPORT: CPT

## 2019-01-15 RX ADMIN — CARBIDOPA AND LEVODOPA 1 TABLET(S): 25; 100 TABLET ORAL at 21:17

## 2019-01-15 RX ADMIN — CARBIDOPA AND LEVODOPA 1 TABLET(S): 25; 100 TABLET ORAL at 05:41

## 2019-01-15 RX ADMIN — AMLODIPINE BESYLATE 10 MILLIGRAM(S): 2.5 TABLET ORAL at 05:42

## 2019-01-15 RX ADMIN — HEPARIN SODIUM 5000 UNIT(S): 5000 INJECTION INTRAVENOUS; SUBCUTANEOUS at 21:16

## 2019-01-15 RX ADMIN — SODIUM CHLORIDE 100 MILLILITER(S): 9 INJECTION INTRAMUSCULAR; INTRAVENOUS; SUBCUTANEOUS at 01:45

## 2019-01-15 RX ADMIN — Medication 81 MILLIGRAM(S): at 11:21

## 2019-01-15 RX ADMIN — Medication 1000 UNIT(S): at 14:30

## 2019-01-15 RX ADMIN — Medication 100 MILLIGRAM(S): at 05:41

## 2019-01-15 RX ADMIN — Medication 1000 UNIT(S): at 21:16

## 2019-01-15 RX ADMIN — Medication 1000 UNIT(S): at 05:41

## 2019-01-15 RX ADMIN — HEPARIN SODIUM 5000 UNIT(S): 5000 INJECTION INTRAVENOUS; SUBCUTANEOUS at 14:30

## 2019-01-15 RX ADMIN — Medication 1: at 17:25

## 2019-01-15 RX ADMIN — CARBIDOPA AND LEVODOPA 1 TABLET(S): 25; 100 TABLET ORAL at 14:30

## 2019-01-15 RX ADMIN — ATORVASTATIN CALCIUM 10 MILLIGRAM(S): 80 TABLET, FILM COATED ORAL at 21:16

## 2019-01-15 RX ADMIN — FEBUXOSTAT 40 MILLIGRAM(S): 40 TABLET ORAL at 11:21

## 2019-01-15 RX ADMIN — CLOPIDOGREL BISULFATE 75 MILLIGRAM(S): 75 TABLET, FILM COATED ORAL at 11:21

## 2019-01-15 RX ADMIN — HEPARIN SODIUM 5000 UNIT(S): 5000 INJECTION INTRAVENOUS; SUBCUTANEOUS at 05:41

## 2019-01-15 RX ADMIN — Medication 1 TABLET(S): at 11:21

## 2019-01-15 NOTE — CONSULT NOTE ADULT - ASSESSMENT
A: CP r/o ACS. No ST changes and neg enzymes indicates no evidence for MI.  R/B does not favor cath but I feel useful to get pharmacologic stress. If negative then no added therapy for CP related to his CVD management. If + options would be prn NTG or Imdur if tolerable and medical therapy vs cath and PCI with risks especially with renal dysfunction. I am of the opinion that I favor medical therapy if possible.    P: Lexiscan MPI ordered.  Will comment further after results known.  Continue to monitor and try NTG PRN as appropriate for any recurrent symptoms.

## 2019-01-15 NOTE — DIETITIAN INITIAL EVALUATION ADULT. - PERTINENT MEDS FT
MEDICATIONS  (STANDING):  amLODIPine   Tablet 10 milliGRAM(s) Oral daily  aspirin  chewable 81 milliGRAM(s) Oral daily  atorvastatin 10 milliGRAM(s) Oral at bedtime  carbidopa/levodopa  25/100 1 Tablet(s) Oral three times a day  cholecalciferol 1000 Unit(s) Oral three times a day  clopidogrel Tablet 75 milliGRAM(s) Oral daily  dextrose 5%. 1000 milliLiter(s) (50 mL/Hr) IV Continuous <Continuous>  dextrose 50% Injectable 12.5 Gram(s) IV Push once  dextrose 50% Injectable 25 Gram(s) IV Push once  dextrose 50% Injectable 25 Gram(s) IV Push once  febuxostat 40 milliGRAM(s) Oral daily  heparin  Injectable 5000 Unit(s) SubCutaneous every 8 hours  insulin lispro (HumaLOG) corrective regimen sliding scale   SubCutaneous three times a day before meals  lactobacillus acidophilus 1 Tablet(s) Oral daily  metoprolol succinate  milliGRAM(s) Oral daily  sodium chloride 0.9%. 1000 milliLiter(s) (100 mL/Hr) IV Continuous <Continuous>    MEDICATIONS  (PRN):  acetaminophen   Tablet .. 650 milliGRAM(s) Oral every 6 hours PRN Temp greater or equal to 38C (100.4F), Moderate Pain (4 - 6)  dextrose 40% Gel 15 Gram(s) Oral once PRN Blood Glucose LESS THAN 70 milliGRAM(s)/deciliter  glucagon  Injectable 1 milliGRAM(s) IntraMuscular once PRN Glucose LESS THAN 70 milligrams/deciliter

## 2019-01-15 NOTE — CONSULT NOTE ADULT - SUBJECTIVE AND OBJECTIVE BOX
S: see dictated consult report in [facility] Physician Portal for details.  Had CP across anterior chest and SOB for about 90 minutes as per admission note. Patient unable to give other details. Says walks about 10 feet daily and gets ASHFORD and CP on walking for :some time"  No CP or SOB now.  PMH and hospital details =reviewed (notes, VS, labs, orders.    O: NAD Ate breakfast.  HR 60 R 14 T 97 BP 94/58 O2 sat 93%  No diaph. Unshaven  No vis JVP  Lungs clear ant  RRR 2/6 MADIHA no g  abd non tender  No edema No calf tend  Rad pulse intact  \Alert  Calm and flat affect.  ECG SR FDAVB LAHB and RBBB and no Dx ST elevation or depression/  Monitor RRR  K 3.9 and Cr 1.83  Glu 131   and Troponin neg x 2  Repeat ECG and no dx ST changes.

## 2019-01-15 NOTE — PROGRESS NOTE ADULT - ASSESSMENT
89 year old male with Pertinent hx of CAD s/p stents, PD walks with cane,  DM and HTN presents to ED c/o of sudden onset chest pressure. Also c/o acute on chronic ASHFORD. states can only walk 2 steps before he gets sob. Presently feels well. Awaiting NST.     #CP/ASHFORD   -Awaiting NST   -Asa, statin, beta-blocker  -NTG PRN  -no evidence of effusion on CXR  -no evidence of pericardial effusions  -TNI neg to date        #Acute on chronic kidney disease  -baseline Cr 1.3-1.5 Currently 2.1-improving  -trend bmp  -gentle hydration    #HTN  -stable  -c/w amlodipine    #HLD  -f/u lipid panel  -c/w lipitor    #Gout  -c/w uloric 40 mg daily    #Parkinson's  -c/w carbidopa/ levodopa    IMPROVE VTE Individual Risk Assessment    RISK                                                                Points    [  ] Previous VTE                                                  3    [  ] Thrombophilia                                               2    [  ] Lower limb paralysis                                      2        (unable to hold up >15 seconds)      [  ] Current Cancer                                              2         (within 6 months)    [  ] Immobilization > 24 hrs                                1    [  ] ICU/CCU stay > 24 hours                              1    [  ] Age > 60                                                      1    IMPROVE VTE Score __2_______  ~cont. Heparin sq    PT consult

## 2019-01-15 NOTE — PROGRESS NOTE ADULT - SUBJECTIVE AND OBJECTIVE BOX
Patient is a 89y old  Male who presents with a chief complaint of Chest Pain r/o ACS (15 Ori 2019 09:57)      SUBJECTIVE:   HPI:  89 year old male with Pertinent hx of CAD s/p stents, PD walks with cane,  DM and HTN presents to ED c/o of sudden onset chest pressure. Also c/o acute on chronic ASHFORD. states can only walk 2 steps before he gets sob. Presently feels well. Awaiting NST.           ICU Vital Signs Last 24 Hrs  T(C): 36.1 (15 Ori 2019 05:19), Max: 36.6 (14 Jan 2019 20:06)  T(F): 97 (15 Ori 2019 05:19), Max: 97.8 (14 Jan 2019 20:06)  HR: 62 (15 Ori 2019 10:00) (60 - 89)  BP: 139/74 (15 Ori 2019 10:00) (94/58 - 152/70)  BP(mean): 90 (15 Ori 2019 10:00) (64 - 98)  ABP: --  ABP(mean): --  RR: 15 (15 Ori 2019 10:00) (13 - 21)  SpO2: 96% (15 Ori 2019 10:00) (93% - 100%)            PHYSICAL EXAM:    Constitutional: NAD, awake and alert, well-developed  HEENT: PERR, EOMI, Normal Hearing, MMM  Neck: Soft and supple, No LAD, No JVD  Respiratory: Breath sounds are clear bilaterally, No wheezing, rales or rhonchi  Cardiovascular: S1 and S2, regular rate and rhythm, no Murmurs, gallops or rubs  Gastrointestinal: Bowel Sounds present, soft, nontender, nondistended, no guarding, no rebound  Extremities: No peripheral edema  Vascular: 2+ peripheral pulses  Neurological: A/O x 3, no focal deficits  Musculoskeletal: 5/5 strength b/l upper and lower extremities  Skin: No rashes    MEDICATIONS:  MEDICATIONS  (STANDING):  amLODIPine   Tablet 10 milliGRAM(s) Oral daily  aspirin  chewable 81 milliGRAM(s) Oral daily  atorvastatin 10 milliGRAM(s) Oral at bedtime  carbidopa/levodopa  25/100 1 Tablet(s) Oral three times a day  cholecalciferol 1000 Unit(s) Oral three times a day  clopidogrel Tablet 75 milliGRAM(s) Oral daily  dextrose 5%. 1000 milliLiter(s) (50 mL/Hr) IV Continuous <Continuous>  dextrose 50% Injectable 12.5 Gram(s) IV Push once  dextrose 50% Injectable 25 Gram(s) IV Push once  dextrose 50% Injectable 25 Gram(s) IV Push once  febuxostat 40 milliGRAM(s) Oral daily  heparin  Injectable 5000 Unit(s) SubCutaneous every 8 hours  insulin lispro (HumaLOG) corrective regimen sliding scale   SubCutaneous three times a day before meals  lactobacillus acidophilus 1 Tablet(s) Oral daily  metoprolol succinate  milliGRAM(s) Oral daily  sodium chloride 0.9%. 1000 milliLiter(s) (100 mL/Hr) IV Continuous <Continuous>      LABS: All Labs Reviewed:                        13.0   6.81  )-----------( 170      ( 15 Ori 2019 05:10 )             39.4     01-15    140  |  108  |  35<H>  ----------------------------<  130<H>  3.9   |  25  |  1.83<H>    Ca    8.8      15 Ori 2019 05:10    TPro  6.2  /  Alb  3.0<L>  /  TBili  0.4  /  DBili  x   /  AST  16  /  ALT  15  /  AlkPhos  81  01-15    PT/INR - ( 14 Jan 2019 20:25 )   PT: 11.2 sec;   INR: 1.01 ratio         PTT - ( 14 Jan 2019 20:25 )  PTT:33.2 sec  CARDIAC MARKERS ( 14 Jan 2019 23:41 )  <0.015 ng/mL / x     / x     / x     / x      CARDIAC MARKERS ( 14 Jan 2019 20:25 )  <0.015 ng/mL / x     / x     / x     / x              Blood Culture:     RADIOLOGY/EKG:    Total D/C time > 30 min

## 2019-01-15 NOTE — DIETITIAN INITIAL EVALUATION ADULT. - OTHER INFO
received consult for PU> stage 2; 89yo male with PMH of CAD s/p stents, DM, HTN, parkinson's, macular degeneration, gout p/w sudden onset chest pressure x 1 day.  Pt pending NST.  Pt also with acute on chronic kidney disease.  Upon visit, pt appears well nourished and obese.  Pt endorses no wt change and good appetite/PO intake PTA.  Diet recall reveals pt likely meeting nutr needs PTA.  (+) trace Lt/Rt ankle, and mild Lt/Rt foot edema.  adelfo score of 15, stage 2 PU on Rt buttocks.  no BM noted. Labs noted; A1C 7.5.  FS WNL. Pt with increased protein needs for wound healing, reviewed need for adequate protein and encouraged protein intake with each meal, which pt verbalized intending to do.  No oral supplement needed at this time.  RECOMMENDATIONS: 1) change diet to DASH, consistent carb 2) encourage protein intake with each meal 3) add MVI with minerals daily and vitamin C 500mg BID to optimize wound healing 4) monitor FS: adjust insulin regimen prn 5) daily wt checks

## 2019-01-15 NOTE — DIETITIAN INITIAL EVALUATION ADULT. - PERTINENT LABORATORY DATA
01-15 Na140 mmol/L Glu 130 mg/dL<H> K+ 3.9 mmol/L Cr  1.83 mg/dL<H> BUN 35 mg/dL<H> Phos n/a   Alb 3.0 g/dL<L> PAB n/a

## 2019-01-16 LAB
ANION GAP SERPL CALC-SCNC: 5 MMOL/L — SIGNIFICANT CHANGE UP (ref 5–17)
BUN SERPL-MCNC: 35 MG/DL — HIGH (ref 7–23)
CALCIUM SERPL-MCNC: 8.8 MG/DL — SIGNIFICANT CHANGE UP (ref 8.5–10.1)
CHLORIDE SERPL-SCNC: 110 MMOL/L — HIGH (ref 96–108)
CO2 SERPL-SCNC: 25 MMOL/L — SIGNIFICANT CHANGE UP (ref 22–31)
CREAT SERPL-MCNC: 1.63 MG/DL — HIGH (ref 0.5–1.3)
GLUCOSE BLDC GLUCOMTR-MCNC: 105 MG/DL — HIGH (ref 70–99)
GLUCOSE BLDC GLUCOMTR-MCNC: 128 MG/DL — HIGH (ref 70–99)
GLUCOSE BLDC GLUCOMTR-MCNC: 154 MG/DL — HIGH (ref 70–99)
GLUCOSE SERPL-MCNC: 142 MG/DL — HIGH (ref 70–99)
HCT VFR BLD CALC: 39 % — SIGNIFICANT CHANGE UP (ref 39–50)
HGB BLD-MCNC: 12.9 G/DL — LOW (ref 13–17)
MCHC RBC-ENTMCNC: 29.3 PG — SIGNIFICANT CHANGE UP (ref 27–34)
MCHC RBC-ENTMCNC: 33.1 GM/DL — SIGNIFICANT CHANGE UP (ref 32–36)
MCV RBC AUTO: 88.6 FL — SIGNIFICANT CHANGE UP (ref 80–100)
NRBC # BLD: 0 /100 WBCS — SIGNIFICANT CHANGE UP (ref 0–0)
PLATELET # BLD AUTO: 167 K/UL — SIGNIFICANT CHANGE UP (ref 150–400)
POTASSIUM SERPL-MCNC: 4.1 MMOL/L — SIGNIFICANT CHANGE UP (ref 3.5–5.3)
POTASSIUM SERPL-SCNC: 4.1 MMOL/L — SIGNIFICANT CHANGE UP (ref 3.5–5.3)
RBC # BLD: 4.4 M/UL — SIGNIFICANT CHANGE UP (ref 4.2–5.8)
RBC # FLD: 13.8 % — SIGNIFICANT CHANGE UP (ref 10.3–14.5)
SODIUM SERPL-SCNC: 140 MMOL/L — SIGNIFICANT CHANGE UP (ref 135–145)
WBC # BLD: 7.11 K/UL — SIGNIFICANT CHANGE UP (ref 3.8–10.5)
WBC # FLD AUTO: 7.11 K/UL — SIGNIFICANT CHANGE UP (ref 3.8–10.5)

## 2019-01-16 PROCEDURE — 78452 HT MUSCLE IMAGE SPECT MULT: CPT | Mod: 26

## 2019-01-16 PROCEDURE — 93016 CV STRESS TEST SUPVJ ONLY: CPT

## 2019-01-16 PROCEDURE — 93018 CV STRESS TEST I&R ONLY: CPT

## 2019-01-16 RX ORDER — SODIUM CHLORIDE 9 MG/ML
1000 INJECTION INTRAMUSCULAR; INTRAVENOUS; SUBCUTANEOUS
Qty: 0 | Refills: 0 | Status: DISCONTINUED | OUTPATIENT
Start: 2019-01-16 | End: 2019-01-17

## 2019-01-16 RX ORDER — REGADENOSON 0.08 MG/ML
0.4 INJECTION, SOLUTION INTRAVENOUS ONCE
Qty: 0 | Refills: 0 | Status: COMPLETED | OUTPATIENT
Start: 2019-01-16 | End: 2019-01-16

## 2019-01-16 RX ADMIN — ATORVASTATIN CALCIUM 10 MILLIGRAM(S): 80 TABLET, FILM COATED ORAL at 22:33

## 2019-01-16 RX ADMIN — CLOPIDOGREL BISULFATE 75 MILLIGRAM(S): 75 TABLET, FILM COATED ORAL at 13:11

## 2019-01-16 RX ADMIN — SODIUM CHLORIDE 75 MILLILITER(S): 9 INJECTION INTRAMUSCULAR; INTRAVENOUS; SUBCUTANEOUS at 16:55

## 2019-01-16 RX ADMIN — HEPARIN SODIUM 5000 UNIT(S): 5000 INJECTION INTRAVENOUS; SUBCUTANEOUS at 22:33

## 2019-01-16 RX ADMIN — HEPARIN SODIUM 5000 UNIT(S): 5000 INJECTION INTRAVENOUS; SUBCUTANEOUS at 15:24

## 2019-01-16 RX ADMIN — Medication 1: at 16:54

## 2019-01-16 RX ADMIN — Medication 650 MILLIGRAM(S): at 22:30

## 2019-01-16 RX ADMIN — CARBIDOPA AND LEVODOPA 1 TABLET(S): 25; 100 TABLET ORAL at 22:33

## 2019-01-16 RX ADMIN — Medication 1000 UNIT(S): at 22:33

## 2019-01-16 RX ADMIN — CARBIDOPA AND LEVODOPA 1 TABLET(S): 25; 100 TABLET ORAL at 06:13

## 2019-01-16 RX ADMIN — CARBIDOPA AND LEVODOPA 1 TABLET(S): 25; 100 TABLET ORAL at 13:11

## 2019-01-16 RX ADMIN — Medication 81 MILLIGRAM(S): at 13:11

## 2019-01-16 RX ADMIN — HEPARIN SODIUM 5000 UNIT(S): 5000 INJECTION INTRAVENOUS; SUBCUTANEOUS at 06:14

## 2019-01-16 RX ADMIN — Medication 1000 UNIT(S): at 13:11

## 2019-01-16 RX ADMIN — AMLODIPINE BESYLATE 10 MILLIGRAM(S): 2.5 TABLET ORAL at 06:13

## 2019-01-16 RX ADMIN — REGADENOSON 0.4 MILLIGRAM(S): 0.08 INJECTION, SOLUTION INTRAVENOUS at 11:00

## 2019-01-16 RX ADMIN — Medication 1000 UNIT(S): at 06:13

## 2019-01-16 RX ADMIN — Medication 650 MILLIGRAM(S): at 23:00

## 2019-01-16 RX ADMIN — Medication 1 TABLET(S): at 13:11

## 2019-01-16 RX ADMIN — FEBUXOSTAT 40 MILLIGRAM(S): 40 TABLET ORAL at 13:11

## 2019-01-16 RX ADMIN — Medication 100 MILLIGRAM(S): at 13:11

## 2019-01-16 NOTE — PHYSICAL THERAPY INITIAL EVALUATION ADULT - LEVEL OF INDEPENDENCE: SUPINE/SIT, REHAB EVAL
Pt did not want to attempt OOB at this time secondary just back from stress test and is very hungry. Pt stated he has not eaten since 5:30 pm on 1/15/2019. Pt also very frustrated  with testing process ex. stress test, echo.

## 2019-01-16 NOTE — PHYSICAL THERAPY INITIAL EVALUATION ADULT - PRECAUTIONS/LIMITATIONS, REHAB EVAL
cardiac precautions/Tele Tele/cardiac precautions/fall precautions fall precautions/cardiac precautions/Tele, pt on bedrest at this time.

## 2019-01-16 NOTE — PROGRESS NOTE ADULT - SUBJECTIVE AND OBJECTIVE BOX
INTERVAL HPI/OVERNIGHT EVENTS:  89 year old male with Pertinent hx of CAD s/p stents, PD walks with cane,  DM and HTN presents to ED c/o of sudden onset chest pressure. Also c/o acute on chronic ASHFORD. states can only walk 2 steps before he gets sob. Presently feels well. Awaiting NST.     1/16/19- Patient seen and examined at bedside. Had NST today- no signs of fixed or reversible perfusion defects. Denies any CP, SOB, palpitations.    MEDICATIONS  (STANDING):  amLODIPine   Tablet 10 milliGRAM(s) Oral daily  aspirin  chewable 81 milliGRAM(s) Oral daily  atorvastatin 10 milliGRAM(s) Oral at bedtime  carbidopa/levodopa  25/100 1 Tablet(s) Oral three times a day  cholecalciferol 1000 Unit(s) Oral three times a day  clopidogrel Tablet 75 milliGRAM(s) Oral daily  dextrose 5%. 1000 milliLiter(s) (50 mL/Hr) IV Continuous <Continuous>  dextrose 50% Injectable 12.5 Gram(s) IV Push once  dextrose 50% Injectable 25 Gram(s) IV Push once  dextrose 50% Injectable 25 Gram(s) IV Push once  febuxostat 40 milliGRAM(s) Oral daily  heparin  Injectable 5000 Unit(s) SubCutaneous every 8 hours  insulin lispro (HumaLOG) corrective regimen sliding scale   SubCutaneous three times a day before meals  lactobacillus acidophilus 1 Tablet(s) Oral daily  metoprolol succinate  milliGRAM(s) Oral daily  sodium chloride 0.9%. 1000 milliLiter(s) (75 mL/Hr) IV Continuous <Continuous>    MEDICATIONS  (PRN):  acetaminophen   Tablet .. 650 milliGRAM(s) Oral every 6 hours PRN Temp greater or equal to 38C (100.4F), Moderate Pain (4 - 6)  dextrose 40% Gel 15 Gram(s) Oral once PRN Blood Glucose LESS THAN 70 milliGRAM(s)/deciliter  glucagon  Injectable 1 milliGRAM(s) IntraMuscular once PRN Glucose LESS THAN 70 milligrams/deciliter      Allergies    No Known Drug Allergies  Originally Entered as [Hives] reaction to [Insect Extracts] (Unknown)    Intolerances      ROS:  CONSTITUTIONAL: No weakness, fevers or chills  EYES/ENT: No visual changes;  No vertigo or throat pain   NECK: No pain or stiffness  RESPIRATORY: No cough, wheezing, hemoptysis; No shortness of breath  CARDIOVASCULAR: No chest pain or palpitations  GASTROINTESTINAL: No abdominal or epigastric pain. No nausea, vomiting, or hematemesis  GENITOURINARY: No dysuria, frequency or hematuria  NEUROLOGICAL: No numbness or weakness  SKIN: No itching, burning, rashes, or lesions   All other review of systems is negative unless indicated above.    Vital Signs Last 24 Hrs  T(C): 36.2 (16 Jan 2019 13:15), Max: 37.6 (16 Jan 2019 13:10)  T(F): 97.2 (16 Jan 2019 13:15), Max: 99.7 (16 Jan 2019 13:10)  HR: 72 (16 Jan 2019 13:15) (62 - 74)  BP: 148/76 (16 Jan 2019 13:15) (129/65 - 154/79)  BP(mean): --  RR: 18 (16 Jan 2019 13:15) (18 - 18)  SpO2: 96% (16 Jan 2019 13:15) (92% - 98%)    01-15 @ 07:01  -  01-16 @ 07:00  --------------------------------------------------------  IN: 400 mL / OUT: 200 mL / NET: 200 mL      Physical Exam:  General: WN/WD NAD  Neurology: A&Ox3, nonfocal, PANCHAL x 4  Respiratory: CTA B/L  CV: RRR, S1S2  Abdominal: Soft, NT, ND +BS  Extremities: No edema, + peripheral pulses      LABS:                        12.9   7.11  )-----------( 167      ( 16 Jan 2019 07:20 )             39.0     01-16    140  |  110<H>  |  35<H>  ----------------------------<  142<H>  4.1   |  25  |  1.63<H>    Ca    8.8      16 Jan 2019 07:20    TPro  6.2  /  Alb  3.0<L>  /  TBili  0.4  /  DBili  x   /  AST  16  /  ALT  15  /  AlkPhos  81  01-15    PT/INR - ( 14 Jan 2019 20:25 )   PT: 11.2 sec;   INR: 1.01 ratio         PTT - ( 14 Jan 2019 20:25 )  PTT:33.2 sec      RADIOLOGY & ADDITIONAL TESTS:

## 2019-01-16 NOTE — PHYSICAL THERAPY INITIAL EVALUATION ADULT - PASSIVE RANGE OF MOTION EXAMINATION, REHAB EVAL
Bilateral shoulder flex ~ 120 degrees, and bilateral DF neutral./no Passive ROM deficits were identified

## 2019-01-16 NOTE — PROGRESS NOTE ADULT - ASSESSMENT
#CP/ASHFORD   -Awaiting NST- no signs of fixed or reversible perfusion defects  -Asa, statin, beta-blocker  -NTG PRN  -no evidence of effusion on CXR  -no evidence of pericardial effusions  -TNI neg to date    #Acute on chronic kidney disease  -baseline Cr 1.3-1.5 Currently 1.63-improving  -trend bmp  -gentle hydration, encourage PO water intake    #HTN  -stable  -c/w amlodipine    #HLD  -f/u lipid panel  -c/w lipitor    #Gout  -c/w uloric 40 mg daily    #Parkinson's  -c/w carbidopa/ levodopa    #DVT ppx  ~cont. Heparin sq    PT consult

## 2019-01-16 NOTE — PHYSICAL THERAPY INITIAL EVALUATION ADULT - GENERAL OBSERVATIONS, REHAB EVAL
Pt found supine in bed with tele monitor and bed alarm activated. Pt just back from stress test. This is the third time attempting to see pt. Pt stated he just got back from stress test and is very hungry. Pt does not want to attempt OOB at this time, but does agree to bed eval. Pt with no c/o pain but is frustrated with whole testing process.

## 2019-01-16 NOTE — PROGRESS NOTE ADULT - SUBJECTIVE AND OBJECTIVE BOX
S: Rev notes, VS, meds, labs and Dx: CP, ASHD, possible ACS, Echo report, arrhythmias, HTN  Patient in Nuclear scanner for Lexiscan test.  No reported CP or SOB.  ROS unobtainable.  Having PT evaluation.    O: T 97.4  HR 62  /65  O2 sat 98% on O2   K 4.1  Cr 1.63  CXR was clear.  Monitor rev: SR alt with AV pacing. First degree AVB  .

## 2019-01-16 NOTE — PROGRESS NOTE ADULT - ASSESSMENT
A: Hx prior and now recent CP under evaluation. ASHFORD without echo reason based on LV function. No CXR abnormality to account for ASHFORD and BNP normal.  ASHD under evaluation by Lexiscan MPI today. No new arrhythmias. BP ok    P: Review Lexiscan results and comment further.  Same CVD treatment for now.  If ischemia consider medical therapy as opposed to invasive evaluation (my opinion given age and co-morbid conditions.)  I discussed this advice with patient's wife as well.

## 2019-01-17 ENCOUNTER — TRANSCRIPTION ENCOUNTER (OUTPATIENT)
Age: 84
End: 2019-01-17

## 2019-01-17 VITALS
RESPIRATION RATE: 18 BRPM | TEMPERATURE: 97 F | DIASTOLIC BLOOD PRESSURE: 68 MMHG | HEART RATE: 68 BPM | SYSTOLIC BLOOD PRESSURE: 147 MMHG | OXYGEN SATURATION: 97 %

## 2019-01-17 LAB
ANION GAP SERPL CALC-SCNC: 8 MMOL/L — SIGNIFICANT CHANGE UP (ref 5–17)
BUN SERPL-MCNC: 31 MG/DL — HIGH (ref 7–23)
CALCIUM SERPL-MCNC: 8.9 MG/DL — SIGNIFICANT CHANGE UP (ref 8.5–10.1)
CHLORIDE SERPL-SCNC: 110 MMOL/L — HIGH (ref 96–108)
CO2 SERPL-SCNC: 24 MMOL/L — SIGNIFICANT CHANGE UP (ref 22–31)
CREAT SERPL-MCNC: 1.51 MG/DL — HIGH (ref 0.5–1.3)
GLUCOSE BLDC GLUCOMTR-MCNC: 143 MG/DL — HIGH (ref 70–99)
GLUCOSE BLDC GLUCOMTR-MCNC: 191 MG/DL — HIGH (ref 70–99)
GLUCOSE SERPL-MCNC: 129 MG/DL — HIGH (ref 70–99)
POTASSIUM SERPL-MCNC: 4 MMOL/L — SIGNIFICANT CHANGE UP (ref 3.5–5.3)
POTASSIUM SERPL-SCNC: 4 MMOL/L — SIGNIFICANT CHANGE UP (ref 3.5–5.3)
SODIUM SERPL-SCNC: 142 MMOL/L — SIGNIFICANT CHANGE UP (ref 135–145)

## 2019-01-17 RX ADMIN — Medication 1000 UNIT(S): at 06:33

## 2019-01-17 RX ADMIN — AMLODIPINE BESYLATE 10 MILLIGRAM(S): 2.5 TABLET ORAL at 06:33

## 2019-01-17 RX ADMIN — CLOPIDOGREL BISULFATE 75 MILLIGRAM(S): 75 TABLET, FILM COATED ORAL at 11:56

## 2019-01-17 RX ADMIN — CARBIDOPA AND LEVODOPA 1 TABLET(S): 25; 100 TABLET ORAL at 06:33

## 2019-01-17 RX ADMIN — FEBUXOSTAT 40 MILLIGRAM(S): 40 TABLET ORAL at 11:56

## 2019-01-17 RX ADMIN — HEPARIN SODIUM 5000 UNIT(S): 5000 INJECTION INTRAVENOUS; SUBCUTANEOUS at 06:33

## 2019-01-17 RX ADMIN — Medication 1: at 08:26

## 2019-01-17 RX ADMIN — Medication 1 TABLET(S): at 11:56

## 2019-01-17 RX ADMIN — Medication 100 MILLIGRAM(S): at 06:33

## 2019-01-17 RX ADMIN — Medication 81 MILLIGRAM(S): at 11:56

## 2019-01-17 NOTE — PROGRESS NOTE ADULT - SUBJECTIVE AND OBJECTIVE BOX
S: Rev labs, meds, VS, notes. Rev Dx CP/SOB, Lexiscan results, Echo data, MR, BP  Patient gets all care at home by home care MD and does not see doctors outside of home and does not go to my office.  No CP or SOB. Feels well now.  Rest ROS : less overall generalized pain since here. No other specific symptoms.    O: NAD in bed. 60 degrees up.  T 97.2 HR 65  R 16 /66, O2 sat 95% on O2.  No diaph  No vis JVP  Lungs clear ant and dec BS bases.  RRR 2/6 systolic m  Abd non tender.  \Extr no tend.  Alert and no tremor.  Calm and flat affect.  Echo had reported normal EF, dec LV compliance, trace MR and 1+ TR.  Lexiscan shows normal perfusion and no ischemia. No wall motion abnormality  K 4.0 and Cr 1.51

## 2019-01-17 NOTE — DISCHARGE NOTE ADULT - HOSPITAL COURSE
89 year old male with Pertinent hx of CAD s/p stents, PD walks with cane,  DM and HTN presents to ED c/o of sudden onset chest pressure. Also c/o acute on chronic ASHFORD. states can only walk 2 steps before he gets sob. Presently feels well. Awaiting NST.     1/16/19- Patient seen and examined at bedside. Had NST today- no signs of fixed or reversible perfusion defects. Denies any CP, SOB, palpitations.  1/17/19- Patient seen and examined at bedside. States he feels well, denies any CP, SOB, palpitations. Wants to go home    Physical Exam:  General: WN/WD NAD  Neurology: A&Ox3, nonfocal, PANCHAL x 4  Respiratory: CTA B/L  CV: RRR, S1S2  Abdominal: Soft, NT, ND +BS  Extremities: No edema, + peripheral pulses    #CP/ASHFORD   -Awaiting NST- no signs of fixed or reversible perfusion defects  -Asa, statin, beta-blocker  -NTG PRN  -no evidence of effusion on CXR  -no evidence of pericardial effusions  -TNI neg to date    #Acute on chronic kidney disease  -baseline Cr 1.3-1.5 Currently 1.51-resolved  -trend bmp  -gentle hydration, encourage PO water intake    #HTN  -stable  -c/w amlodipine    #HLD  -f/u lipid panel  -c/w lipitor    #Gout  -c/w uloric 40 mg daily    #Parkinson's  -c/w carbidopa/ levodopa    #DM  -Diet controlled    Total time spent on discharge: 34 minutes 89 year old male with Pertinent hx of CAD s/p stents, PD walks with cane,  DM and HTN presents to ED c/o of sudden onset chest pressure. Also c/o acute on chronic ASHFORD. states can only walk 2 steps before he gets sob. Presently feels well. Awaiting NST.     1/16/19- Patient seen and examined at bedside. Had NST today- no signs of fixed or reversible perfusion defects. Denies any CP, SOB, palpitations.  1/17/19- Patient seen and examined at bedside. States he feels well, denies any CP, SOB, palpitations. Wants to go home    Physical Exam:  General: WN/WD NAD  Neurology: A&Ox3, nonfocal, PANCHAL x 4  Respiratory: CTA B/L  CV: RRR, S1S2  Abdominal: Soft, NT, ND +BS  Extremities: No edema, + peripheral pulses    #CP/ASHFORD   -Awaiting NST- no signs of fixed or reversible perfusion defects  -Asa, statin, beta-blocker  -NTG PRN  -no evidence of effusion on CXR  -no evidence of pericardial effusions  -TNI neg to date    #Acute on chronic kidney disease  -baseline Cr 1.3-1.5 Currently 1.51-resolved  -trend bmp  -gentle hydration, encourage PO water intake    #HTN  -stable  -c/w amlodipine    #HLD  -f/u lipid panel  -c/w lipitor    #Gout  -c/w uloric 40 mg daily    #Parkinson's  -c/w carbidopa/ levodopa    #DM  -Diet controlled    Discussed with Cardio, Dr. Martel, agrees with plan  Total time spent on discharge: 34 minutes

## 2019-01-17 NOTE — DISCHARGE NOTE ADULT - PATIENT PORTAL LINK FT
You can access the [facility] Patient Portal, offered by [facility], by registering with the following website: [email or web address]

## 2019-01-17 NOTE — DISCHARGE NOTE ADULT - CARE PLAN
Principal Discharge DX:	Chest pain  Goal:	resolution of chest pain  Assessment and plan of treatment:	-Continue ASA, plavix, statin  -Your nuclear stress test was negative  -Follow up with Cardiology within 2 weeks of discharge

## 2019-01-17 NOTE — DISCHARGE NOTE ADULT - CARE PROVIDER_API CALL
Kamran Martel), Cardiovascular Disease; Critical Care Medicine; Internal Medicine  [address] [place] [address]  Phone: [phone]  Fax: [phone]    PCP,   Phone: (   )    -  Fax: (   )    -

## 2019-01-17 NOTE — DISCHARGE NOTE ADULT - MEDICATION SUMMARY - MEDICATIONS TO TAKE
I will START or STAY ON the medications listed below when I get home from the hospital:    aspirin 81 mg oral tablet, chewable  -- 1 tab(s) by mouth once a day  -- Indication: For Cad    Lipitor 10 mg oral tablet  -- 1 tab(s) by mouth once a day (at bedtime)  -- Indication: For hld    Uloric 40 mg oral tablet  -- 1 tab(s) by mouth once a day  -- Indication: For gout    carbidopa-levodopa 25 mg-100 mg oral tablet  -- 1 tab(s) by mouth 3 times a day  -- Indication: For parkinsons    Plavix 75 mg oral tablet  -- 1 tab(s) by mouth once a day  -- Indication: For Cad    Toprol- mg oral tablet, extended release  -- 1 tab(s) by mouth once a day  -- Indication: For htn    amLODIPine 10 mg oral tablet  -- 1 tab(s) by mouth once a day  -- Indication: For htn    Acidophilus oral capsule  -- 1 cap(s) by mouth once a day  -- Indication: For prophylaxis    cholecalciferol 1000 intl units oral tablet  -- 1 tab(s) by mouth 3 times a day  -- Indication: For vitamin

## 2019-01-17 NOTE — PROGRESS NOTE ADULT - ASSESSMENT
A: No LV dysfunction or ischemia. Minimal MR.  Monitor review is SR alt AV pacing, QRS is conducted or paced or pacing fusion. First deg AVB.\  BP ok. Chronic renal dysfunction.  All care once home by Dr. Kristine suarez home care MD.  Cannot explain symptoms but maybe they relate to his Parkinson's disease and effects of osteoarthritis. I discussed it with patient and he feels maybe that is the case. No evidence to justify nitrates for the pain.    P: Discussed with hospitalist now. Discussed with patient. Agree with discharge home to be FU by the PCP who does home care.  As before I will see prn if in hospital as he does not come to the office. I discussed our findings and recommendations for activity and symptoms to merit ER or medical attention.

## 2019-01-17 NOTE — DISCHARGE NOTE ADULT - PLAN OF CARE
resolution of chest pain -Continue ASA, plavix, statin  -Your nuclear stress test was negative  -Follow up with Cardiology within 2 weeks of discharge

## 2019-01-22 NOTE — CDI QUERY NOTE - NSCDIOTHERTXTBX_GEN_ALL_CORE_HH
This patient is documented with in the chart of Acute on chronic kidney disease    -baseline Cr 1.3-1.5 Currently 2.1    DOCUMENTATION:      Creatinine Trend:  Creatinine, Serum: 1.51 mg/dL <H> [0.50 - 1.30] (01-17-19)  Creatinine, Serum: 1.63 mg/dL <H> [0.50 - 1.30] (01-16-19)  Creatinine, Serum: 1.83 mg/dL <H> [0.50 - 1.30] (01-15-19)  Creatinine, Serum: 2.16 mg/dL <H> [0.50 - 1.30] (01-14-19)    Please clarify a diagnosis based on the above clinical findings:    A) Suspected GARRETT on chronic kidney diasese ( Please stage chronic kidney disease)  B) Probable CKD and no evidence of GARRETT ( Please stage chronic Kidney disease)  C) Concern for GARRETT and no evidence of chronic kidney disease  D) Other ( Please specify condition)

## 2019-01-29 DIAGNOSIS — E11.9 TYPE 2 DIABETES MELLITUS WITHOUT COMPLICATIONS: ICD-10-CM

## 2019-01-29 DIAGNOSIS — N18.3 CHRONIC KIDNEY DISEASE, STAGE 3 (MODERATE): ICD-10-CM

## 2019-01-29 DIAGNOSIS — N17.9 ACUTE KIDNEY FAILURE, UNSPECIFIED: ICD-10-CM

## 2019-01-29 DIAGNOSIS — E78.5 HYPERLIPIDEMIA, UNSPECIFIED: ICD-10-CM

## 2019-01-29 DIAGNOSIS — R07.9 CHEST PAIN, UNSPECIFIED: ICD-10-CM

## 2019-01-29 DIAGNOSIS — Z95.0 PRESENCE OF CARDIAC PACEMAKER: ICD-10-CM

## 2019-01-29 DIAGNOSIS — I25.110 ATHEROSCLEROTIC HEART DISEASE OF NATIVE CORONARY ARTERY WITH UNSTABLE ANGINA PECTORIS: ICD-10-CM

## 2019-01-29 DIAGNOSIS — H35.30 UNSPECIFIED MACULAR DEGENERATION: ICD-10-CM

## 2019-01-29 DIAGNOSIS — Z95.5 PRESENCE OF CORONARY ANGIOPLASTY IMPLANT AND GRAFT: ICD-10-CM

## 2019-01-29 DIAGNOSIS — I34.0 NONRHEUMATIC MITRAL (VALVE) INSUFFICIENCY: ICD-10-CM

## 2019-01-29 DIAGNOSIS — M10.9 GOUT, UNSPECIFIED: ICD-10-CM

## 2019-01-29 DIAGNOSIS — G20 PARKINSON'S DISEASE: ICD-10-CM

## 2019-01-29 DIAGNOSIS — I12.9 HYPERTENSIVE CHRONIC KIDNEY DISEASE WITH STAGE 1 THROUGH STAGE 4 CHRONIC KIDNEY DISEASE, OR UNSPECIFIED CHRONIC KIDNEY DISEASE: ICD-10-CM

## 2019-02-05 ENCOUNTER — EMERGENCY (EMERGENCY)
Facility: HOSPITAL | Age: 84
LOS: 0 days | Discharge: HOME | End: 2019-02-05
Attending: EMERGENCY MEDICINE | Admitting: EMERGENCY MEDICINE
Payer: COMMERCIAL

## 2019-02-05 VITALS
OXYGEN SATURATION: 90 % | WEIGHT: 184.97 LBS | RESPIRATION RATE: 16 BRPM | DIASTOLIC BLOOD PRESSURE: 63 MMHG | SYSTOLIC BLOOD PRESSURE: 99 MMHG | TEMPERATURE: 97 F | HEART RATE: 78 BPM | HEIGHT: 70 IN

## 2019-02-05 VITALS
HEART RATE: 80 BPM | SYSTOLIC BLOOD PRESSURE: 169 MMHG | RESPIRATION RATE: 20 BRPM | DIASTOLIC BLOOD PRESSURE: 84 MMHG | OXYGEN SATURATION: 93 %

## 2019-02-05 DIAGNOSIS — R29.898 OTHER SYMPTOMS AND SIGNS INVOLVING THE MUSCULOSKELETAL SYSTEM: ICD-10-CM

## 2019-02-05 DIAGNOSIS — Z79.01 LONG TERM (CURRENT) USE OF ANTICOAGULANTS: ICD-10-CM

## 2019-02-05 DIAGNOSIS — E78.5 HYPERLIPIDEMIA, UNSPECIFIED: ICD-10-CM

## 2019-02-05 DIAGNOSIS — R06.02 SHORTNESS OF BREATH: ICD-10-CM

## 2019-02-05 DIAGNOSIS — Z95.5 PRESENCE OF CORONARY ANGIOPLASTY IMPLANT AND GRAFT: ICD-10-CM

## 2019-02-05 DIAGNOSIS — I10 ESSENTIAL (PRIMARY) HYPERTENSION: ICD-10-CM

## 2019-02-05 DIAGNOSIS — I13.0 HYPERTENSIVE HEART AND CHRONIC KIDNEY DISEASE WITH HEART FAILURE AND STAGE 1 THROUGH STAGE 4 CHRONIC KIDNEY DISEASE, OR UNSPECIFIED CHRONIC KIDNEY DISEASE: ICD-10-CM

## 2019-02-05 DIAGNOSIS — Z98.61 CORONARY ANGIOPLASTY STATUS: Chronic | ICD-10-CM

## 2019-02-05 DIAGNOSIS — Z95.0 PRESENCE OF CARDIAC PACEMAKER: Chronic | ICD-10-CM

## 2019-02-05 DIAGNOSIS — G20 PARKINSON'S DISEASE: ICD-10-CM

## 2019-02-05 DIAGNOSIS — M10.9 GOUT, UNSPECIFIED: ICD-10-CM

## 2019-02-05 DIAGNOSIS — I50.9 HEART FAILURE, UNSPECIFIED: ICD-10-CM

## 2019-02-05 DIAGNOSIS — Z95.0 PRESENCE OF CARDIAC PACEMAKER: ICD-10-CM

## 2019-02-05 DIAGNOSIS — N18.3 CHRONIC KIDNEY DISEASE, STAGE 3 (MODERATE): ICD-10-CM

## 2019-02-05 DIAGNOSIS — H35.30 UNSPECIFIED MACULAR DEGENERATION: ICD-10-CM

## 2019-02-05 LAB
ALBUMIN SERPL ELPH-MCNC: 3.4 G/DL — SIGNIFICANT CHANGE UP (ref 3.3–5)
ALP SERPL-CCNC: 76 U/L — SIGNIFICANT CHANGE UP (ref 40–120)
ALT FLD-CCNC: 13 U/L — SIGNIFICANT CHANGE UP (ref 12–78)
ANION GAP SERPL CALC-SCNC: 8 MMOL/L — SIGNIFICANT CHANGE UP (ref 5–17)
APPEARANCE UR: CLEAR — SIGNIFICANT CHANGE UP
APTT BLD: 34 SEC — SIGNIFICANT CHANGE UP (ref 27.5–36.3)
AST SERPL-CCNC: 16 U/L — SIGNIFICANT CHANGE UP (ref 15–37)
BASOPHILS # BLD AUTO: 0.05 K/UL — SIGNIFICANT CHANGE UP (ref 0–0.2)
BASOPHILS NFR BLD AUTO: 0.5 % — SIGNIFICANT CHANGE UP (ref 0–2)
BILIRUB SERPL-MCNC: 0.5 MG/DL — SIGNIFICANT CHANGE UP (ref 0.2–1.2)
BILIRUB UR-MCNC: NEGATIVE — SIGNIFICANT CHANGE UP
BUN SERPL-MCNC: 28 MG/DL — HIGH (ref 7–23)
CALCIUM SERPL-MCNC: 9.4 MG/DL — SIGNIFICANT CHANGE UP (ref 8.5–10.1)
CHLORIDE SERPL-SCNC: 105 MMOL/L — SIGNIFICANT CHANGE UP (ref 96–108)
CO2 SERPL-SCNC: 27 MMOL/L — SIGNIFICANT CHANGE UP (ref 22–31)
COLOR SPEC: YELLOW — SIGNIFICANT CHANGE UP
CREAT SERPL-MCNC: 1.74 MG/DL — HIGH (ref 0.5–1.3)
DIFF PNL FLD: NEGATIVE — SIGNIFICANT CHANGE UP
EOSINOPHIL # BLD AUTO: 0.18 K/UL — SIGNIFICANT CHANGE UP (ref 0–0.5)
EOSINOPHIL NFR BLD AUTO: 1.9 % — SIGNIFICANT CHANGE UP (ref 0–6)
GLUCOSE SERPL-MCNC: 171 MG/DL — HIGH (ref 70–99)
GLUCOSE UR QL: NEGATIVE MG/DL — SIGNIFICANT CHANGE UP
HCT VFR BLD CALC: 45 % — SIGNIFICANT CHANGE UP (ref 39–50)
HGB BLD-MCNC: 14.8 G/DL — SIGNIFICANT CHANGE UP (ref 13–17)
IMM GRANULOCYTES NFR BLD AUTO: 0.6 % — SIGNIFICANT CHANGE UP (ref 0–1.5)
INR BLD: 1.05 RATIO — SIGNIFICANT CHANGE UP (ref 0.88–1.16)
KETONES UR-MCNC: NEGATIVE — SIGNIFICANT CHANGE UP
LEUKOCYTE ESTERASE UR-ACNC: NEGATIVE — SIGNIFICANT CHANGE UP
LYMPHOCYTES # BLD AUTO: 0.76 K/UL — LOW (ref 1–3.3)
LYMPHOCYTES # BLD AUTO: 7.9 % — LOW (ref 13–44)
MAGNESIUM SERPL-MCNC: 2.1 MG/DL — SIGNIFICANT CHANGE UP (ref 1.6–2.6)
MCHC RBC-ENTMCNC: 29.7 PG — SIGNIFICANT CHANGE UP (ref 27–34)
MCHC RBC-ENTMCNC: 32.9 GM/DL — SIGNIFICANT CHANGE UP (ref 32–36)
MCV RBC AUTO: 90.4 FL — SIGNIFICANT CHANGE UP (ref 80–100)
MONOCYTES # BLD AUTO: 0.63 K/UL — SIGNIFICANT CHANGE UP (ref 0–0.9)
MONOCYTES NFR BLD AUTO: 6.5 % — SIGNIFICANT CHANGE UP (ref 2–14)
NEUTROPHILS # BLD AUTO: 7.94 K/UL — HIGH (ref 1.8–7.4)
NEUTROPHILS NFR BLD AUTO: 82.6 % — HIGH (ref 43–77)
NITRITE UR-MCNC: NEGATIVE — SIGNIFICANT CHANGE UP
NRBC # BLD: 0 /100 WBCS — SIGNIFICANT CHANGE UP (ref 0–0)
NT-PROBNP SERPL-SCNC: 357 PG/ML — SIGNIFICANT CHANGE UP (ref 0–450)
PH UR: 7 — SIGNIFICANT CHANGE UP (ref 5–8)
PLATELET # BLD AUTO: 177 K/UL — SIGNIFICANT CHANGE UP (ref 150–400)
POTASSIUM SERPL-MCNC: 4 MMOL/L — SIGNIFICANT CHANGE UP (ref 3.5–5.3)
POTASSIUM SERPL-SCNC: 4 MMOL/L — SIGNIFICANT CHANGE UP (ref 3.5–5.3)
PROT SERPL-MCNC: 7.1 GM/DL — SIGNIFICANT CHANGE UP (ref 6–8.3)
PROT UR-MCNC: 30 MG/DL
PROTHROM AB SERPL-ACNC: 11.7 SEC — SIGNIFICANT CHANGE UP (ref 10–12.9)
RAPID RVP RESULT: SIGNIFICANT CHANGE UP
RBC # BLD: 4.98 M/UL — SIGNIFICANT CHANGE UP (ref 4.2–5.8)
RBC # FLD: 13.9 % — SIGNIFICANT CHANGE UP (ref 10.3–14.5)
RBC CASTS # UR COMP ASSIST: SIGNIFICANT CHANGE UP /HPF (ref 0–4)
SODIUM SERPL-SCNC: 140 MMOL/L — SIGNIFICANT CHANGE UP (ref 135–145)
SP GR SPEC: 1 — LOW (ref 1.01–1.02)
TROPONIN I SERPL-MCNC: <0.015 NG/ML — SIGNIFICANT CHANGE UP (ref 0.01–0.04)
UROBILINOGEN FLD QL: NEGATIVE MG/DL — SIGNIFICANT CHANGE UP
WBC # BLD: 9.62 K/UL — SIGNIFICANT CHANGE UP (ref 3.8–10.5)
WBC # FLD AUTO: 9.62 K/UL — SIGNIFICANT CHANGE UP (ref 3.8–10.5)
WBC UR QL: SIGNIFICANT CHANGE UP

## 2019-02-05 PROCEDURE — 71275 CT ANGIOGRAPHY CHEST: CPT | Mod: 26

## 2019-02-05 PROCEDURE — 71045 X-RAY EXAM CHEST 1 VIEW: CPT | Mod: 26

## 2019-02-05 PROCEDURE — 93010 ELECTROCARDIOGRAM REPORT: CPT

## 2019-02-05 PROCEDURE — 99284 EMERGENCY DEPT VISIT MOD MDM: CPT

## 2019-02-05 PROCEDURE — 71250 CT THORAX DX C-: CPT | Mod: 26,59

## 2019-02-05 PROCEDURE — 70450 CT HEAD/BRAIN W/O DYE: CPT | Mod: 26

## 2019-02-05 RX ORDER — SODIUM CHLORIDE 9 MG/ML
250 INJECTION INTRAMUSCULAR; INTRAVENOUS; SUBCUTANEOUS ONCE
Qty: 0 | Refills: 0 | Status: COMPLETED | OUTPATIENT
Start: 2019-02-05 | End: 2019-02-05

## 2019-02-05 RX ORDER — SODIUM CHLORIDE 9 MG/ML
1000 INJECTION INTRAMUSCULAR; INTRAVENOUS; SUBCUTANEOUS ONCE
Qty: 0 | Refills: 0 | Status: COMPLETED | OUTPATIENT
Start: 2019-02-05 | End: 2019-02-05

## 2019-02-05 RX ADMIN — SODIUM CHLORIDE 250 MILLILITER(S): 9 INJECTION INTRAMUSCULAR; INTRAVENOUS; SUBCUTANEOUS at 15:16

## 2019-02-05 RX ADMIN — SODIUM CHLORIDE 1000 MILLILITER(S): 9 INJECTION INTRAMUSCULAR; INTRAVENOUS; SUBCUTANEOUS at 15:16

## 2019-02-05 NOTE — ED PROVIDER NOTE - ATTENDING CONTRIBUTION TO CARE
ADONAY Case MD,  performed the initial face to face bedside interview with this patient regarding history of present illness, review of symptoms and relevant past medical, social and family history.  I completed an independent physical examination.  I was the initial provider who evaluated this patient. I have signed out the follow up of any pending tests (i.e. labs, radiological studies) to the resident.  I have communicated the patient’s plan of care and disposition with the resident.

## 2019-02-05 NOTE — ED ADULT NURSE NOTE - OBJECTIVE STATEMENT
89 y/o M presents a7ox3 in triage c/o increased weakness. As per pt's wife, pt had a facial droop yesterday. Pt normally walks slowly with a walker but as increasingly been unable to ambulate. No facial droop noted.

## 2019-02-05 NOTE — ED ADULT TRIAGE NOTE - AS TEMP SITE
As per mother pt c/o abdominal pain x a few days, worsening, denies recent fever, diarrhea a few days ago and vomiting two days ago with decreased po intake, pt with a hx of H-pylori 4 years ago followed by GI oral

## 2019-02-05 NOTE — ED PROVIDER NOTE - PROGRESS NOTE DETAILS
Santiago Lacy (Resident): patient could not sit still for VQ scan - already got a CT non con, but need to rule out PE - okay to also get Scan w/ Cr of 1.7 Santiago Lacy (Resident): had lengthy discussion with patient and wife about negative findings, no PE, no UTI, no PNA - likely just baseline SOB 2/2 to age and progression of disease - patient looks well, okay with going home and following up with MD - no indication for admission based on workup, no concern for stroke Santiago Lacy (Resident): had lengthy discussion with patient and wife about negative findings, no PE, no UTI, no PNA - likely just baseline SOB 2/2 to age and progression of disease - patient looks well, okay with going home and following up with MD - no indication for admission based on workup, no concern for stroke    JW No focal neurological findings. Pt at baseline.  CT chest no emergent findings.  CT head no emergent findings.  SOB likely the result of worsening parkinson's disease.  PT will follow up with PMD and neurologist in 48 hours.  PT discharged in stable condition in NAD.

## 2019-02-05 NOTE — ED ADULT TRIAGE NOTE - CHIEF COMPLAINT QUOTE
Pt has been experiencing weakness x 1 week , poor appetite only eating ice cream ,  denies fever , cough , or any sick contacts , pulse ox 90% on RA in triage , denies sob

## 2019-02-05 NOTE — ED ADULT NURSE REASSESSMENT NOTE - NS ED NURSE REASSESS COMMENT FT1
Pt straight cathed for urine, cleaned and repositioned for comfort. Will continue to monitor for safety and comfort.

## 2019-02-05 NOTE — ED PROVIDER NOTE - MUSCULOSKELETAL, MLM
significant LE weakness. able to wiggle toes. Able to raise R leg off bed 2 cm. L Leg 1-2 cm, drift. No pain with passive ROM of legs. Full ROM and normal strength of UE b/l. Mild paraspinal tenderness of lower Cervical spine

## 2019-02-05 NOTE — ED ADULT NURSE NOTE - NSIMPLEMENTINTERV_GEN_ALL_ED
Implemented All Fall Risk Interventions:  [place] to call system. Call bell, personal items and telephone within reach. Instruct patient to call for assistance. Room bathroom lighting operational. Non-slip footwear when patient is off stretcher. Physically safe environment: no spills, clutter or unnecessary equipment. Stretcher in lowest position, wheels locked, appropriate side rails in place. Provide visual cue, wrist band, yellow gown, etc. Monitor gait and stability. Monitor for mental status changes and reorient to person, place, and time. Review medications for side effects contributing to fall risk. Reinforce activity limits and safety measures with patient and family.

## 2019-02-05 NOTE — ED PROVIDER NOTE - NS_ ATTENDINGSCRIBEDETAILS _ED_A_ED_FT
The scribe's documentation has been prepared under my direction and personally reviewed by me in its entirety.  I confirm that the note above accurately reflects all my work, treatment, procedures, and decision making except where otherwise noted or amended by me.  Edward Case M.D.

## 2019-02-05 NOTE — ED PROVIDER NOTE - OBJECTIVE STATEMENT
89 y/o male hx of Parkinsons, HTN, HLd, Dm, CHF, normally very unsteady on feet but able to slowly ambulate w/ walker presents for stroke eval. Per wife, last night noticed some L sided facial droop that resolved. She also noticed that he wasn't ranging his feet as well so called MD and was concerned for stroke so came to ED. Patient currently A+Ox3. States that his L leg always weaker than his R, and right now he is able to range his legs like normal, 1-2 cm off the bed. patient states he occasionally gets SOB, sometimes when eating or speaking. Unable to lie flat. No f/c, N/V/D, chest pain. Right now, mild SOB. Patient not on any AC.     PMD: yvonne (house calls) 89 y/o male hx of Parkinsons, HTN, HLd, Dm, CHF, normally very unsteady on feet but able to slowly ambulate w/ walker presents for stroke eval. Per wife, last night noticed some L sided facial droop that resolved. She also noticed that he wasn't ranging his feet as well so called MD and was concerned for stroke so came to ED. Patient currently AOx3. States that his L leg always weaker than his R, and right now he is able to range his legs like normal, 1-2 cm off the bed. patient states he occasionally gets SOB, sometimes when eating or speaking. Unable to lie flat. No f/c, N/V/D, chest pain. Right now, mild SOB. Patient not on any AC.     JW Agree with Hx above 89 y/o male hx of Parkinsons, HTN, HLd, Dm, CHF, last normal last night p/w L/S facial droop that resolved and transient leg weakness.  Associated SOB and difficulty laying flat. Pt at baseline now with mild SOB.  No other Sx.  No apraxia, aphasia, agnosia, dysphonia, dysarthria, dysphagia, dizziness, paresthesia, paralysis, sensory deficits in the face or extremities, vertigo, vision changes, headache, nausea, vomiting, or loss of consciousness.     PMD: yvonne (house calls)

## 2019-02-05 NOTE — ED PROVIDER NOTE - MEDICAL DECISION MAKING DETAILS
Santiago Lacy (Resident): 89 y/ parkinsons, CHF, HTN ,HLD presnets with leg weakness, at baseline for patient, non focal exam except for some LLE weakness worse than R which he states is baseline - patient has crackles at bases b/l, hypoxia to 89% on RA, concern for CHF exac vs PE - has CKD, will likely not tolerate a CTA but given patient so sendentary must consider - low concern for stroke but given questionable history of L sided facial droop last night by wife (she was not 100% certain she saw it or which side), will obtain CT - Santiago Lacy (Resident): 89 y/ parkinsons, CHF, HTN ,HLD presnets with leg weakness, at baseline for patient, non focal exam except for some LLE weakness worse than R which he states is baseline - patient has crackles at bases b/l, hypoxia to 89% on RA, concern for CHF exac vs PE - has CKD, will likely not tolerate a CTA but given patient so sendentary must consider - low concern for stroke but given questionable history of L sided facial droop last night by wife (she was not 100% certain she saw it or which side), will obtain CT -    JW : 89 y/ parkinsons, CHF, HTN ,HLD presnets with leg weakness, at baseline for patient per family.  No other focal neurological findings.  Pt otherwise at baseline.  Associated SOB.  Transient hypoxia in the ED now 99%.  DDX TIA, PNA, CHF/COPD exacerbation.  No signs of acute stroke.  CT head screen for intracranial abnormality.  CTA chest evaluate for PNA, CHF, PA.  Symptomatic treatment and reassessment.

## 2019-02-05 NOTE — ED PROVIDER NOTE - CARE PLAN
Principal Discharge DX:	Shortness of breath  Assessment and plan of treatment:	1) Please return to the ED should you have any new or worsening symptoms, worsening pain, develop chest pain, difficulty breathing or any concerning symptoms  2) Please follow up with your primary care doctor in 2-3 days.

## 2019-03-06 NOTE — DISCHARGE NOTE ADULT - VISION (WITH CORRECTIVE LENSES IF THE PATIENT USUALLY WEARS THEM):
Partially impaired: cannot see medication labels or newsprint, but can see obstacles in path, and the surrounding layout; can count fingers at arm's length No

## 2019-03-07 ENCOUNTER — APPOINTMENT (OUTPATIENT)
Dept: ELECTROPHYSIOLOGY | Facility: CLINIC | Age: 84
End: 2019-03-07

## 2019-03-19 ENCOUNTER — EMERGENCY (EMERGENCY)
Facility: HOSPITAL | Age: 84
LOS: 0 days | Discharge: HOME | End: 2019-03-19
Attending: EMERGENCY MEDICINE | Admitting: EMERGENCY MEDICINE
Payer: COMMERCIAL

## 2019-03-19 VITALS
SYSTOLIC BLOOD PRESSURE: 163 MMHG | RESPIRATION RATE: 20 BRPM | OXYGEN SATURATION: 93 % | DIASTOLIC BLOOD PRESSURE: 79 MMHG | HEART RATE: 74 BPM | TEMPERATURE: 98 F

## 2019-03-19 VITALS
RESPIRATION RATE: 17 BRPM | OXYGEN SATURATION: 93 % | WEIGHT: 250 LBS | HEART RATE: 75 BPM | HEIGHT: 72 IN | TEMPERATURE: 98 F | DIASTOLIC BLOOD PRESSURE: 85 MMHG | SYSTOLIC BLOOD PRESSURE: 136 MMHG

## 2019-03-19 DIAGNOSIS — I50.9 HEART FAILURE, UNSPECIFIED: ICD-10-CM

## 2019-03-19 DIAGNOSIS — R29.898 OTHER SYMPTOMS AND SIGNS INVOLVING THE MUSCULOSKELETAL SYSTEM: ICD-10-CM

## 2019-03-19 DIAGNOSIS — Z79.01 LONG TERM (CURRENT) USE OF ANTICOAGULANTS: ICD-10-CM

## 2019-03-19 DIAGNOSIS — I12.9 HYPERTENSIVE CHRONIC KIDNEY DISEASE WITH STAGE 1 THROUGH STAGE 4 CHRONIC KIDNEY DISEASE, OR UNSPECIFIED CHRONIC KIDNEY DISEASE: ICD-10-CM

## 2019-03-19 DIAGNOSIS — E11.22 TYPE 2 DIABETES MELLITUS WITH DIABETIC CHRONIC KIDNEY DISEASE: ICD-10-CM

## 2019-03-19 DIAGNOSIS — E78.5 HYPERLIPIDEMIA, UNSPECIFIED: ICD-10-CM

## 2019-03-19 DIAGNOSIS — Z98.61 CORONARY ANGIOPLASTY STATUS: Chronic | ICD-10-CM

## 2019-03-19 DIAGNOSIS — N18.3 CHRONIC KIDNEY DISEASE, STAGE 3 (MODERATE): ICD-10-CM

## 2019-03-19 DIAGNOSIS — R06.02 SHORTNESS OF BREATH: ICD-10-CM

## 2019-03-19 DIAGNOSIS — Z79.82 LONG TERM (CURRENT) USE OF ASPIRIN: ICD-10-CM

## 2019-03-19 DIAGNOSIS — Z95.0 PRESENCE OF CARDIAC PACEMAKER: Chronic | ICD-10-CM

## 2019-03-19 DIAGNOSIS — G20 PARKINSON'S DISEASE: ICD-10-CM

## 2019-03-19 DIAGNOSIS — R53.1 WEAKNESS: ICD-10-CM

## 2019-03-19 LAB
ALBUMIN SERPL ELPH-MCNC: 3.4 G/DL — SIGNIFICANT CHANGE UP (ref 3.3–5)
ALP SERPL-CCNC: 82 U/L — SIGNIFICANT CHANGE UP (ref 40–120)
ALT FLD-CCNC: 17 U/L — SIGNIFICANT CHANGE UP (ref 12–78)
ANION GAP SERPL CALC-SCNC: 8 MMOL/L — SIGNIFICANT CHANGE UP (ref 5–17)
APPEARANCE UR: ABNORMAL
APTT BLD: 30.8 SEC — SIGNIFICANT CHANGE UP (ref 27.5–36.3)
AST SERPL-CCNC: 23 U/L — SIGNIFICANT CHANGE UP (ref 15–37)
BASOPHILS # BLD AUTO: 0.07 K/UL — SIGNIFICANT CHANGE UP (ref 0–0.2)
BASOPHILS NFR BLD AUTO: 1 % — SIGNIFICANT CHANGE UP (ref 0–2)
BILIRUB SERPL-MCNC: 0.4 MG/DL — SIGNIFICANT CHANGE UP (ref 0.2–1.2)
BILIRUB UR-MCNC: NEGATIVE — SIGNIFICANT CHANGE UP
BUN SERPL-MCNC: 35 MG/DL — HIGH (ref 7–23)
CALCIUM SERPL-MCNC: 9.2 MG/DL — SIGNIFICANT CHANGE UP (ref 8.5–10.1)
CHLORIDE SERPL-SCNC: 108 MMOL/L — SIGNIFICANT CHANGE UP (ref 96–108)
CK SERPL-CCNC: 87 U/L — SIGNIFICANT CHANGE UP (ref 26–308)
CO2 SERPL-SCNC: 25 MMOL/L — SIGNIFICANT CHANGE UP (ref 22–31)
COLOR SPEC: YELLOW — SIGNIFICANT CHANGE UP
CREAT SERPL-MCNC: 1.68 MG/DL — HIGH (ref 0.5–1.3)
DIFF PNL FLD: NEGATIVE — SIGNIFICANT CHANGE UP
EOSINOPHIL # BLD AUTO: 0.15 K/UL — SIGNIFICANT CHANGE UP (ref 0–0.5)
EOSINOPHIL NFR BLD AUTO: 2.2 % — SIGNIFICANT CHANGE UP (ref 0–6)
EPI CELLS # UR: SIGNIFICANT CHANGE UP
GLUCOSE SERPL-MCNC: 174 MG/DL — HIGH (ref 70–99)
GLUCOSE UR QL: NEGATIVE MG/DL — SIGNIFICANT CHANGE UP
HCT VFR BLD CALC: 43.5 % — SIGNIFICANT CHANGE UP (ref 39–50)
HGB BLD-MCNC: 14.4 G/DL — SIGNIFICANT CHANGE UP (ref 13–17)
IMM GRANULOCYTES NFR BLD AUTO: 0.7 % — SIGNIFICANT CHANGE UP (ref 0–1.5)
INR BLD: 1.03 RATIO — SIGNIFICANT CHANGE UP (ref 0.88–1.16)
KETONES UR-MCNC: NEGATIVE — SIGNIFICANT CHANGE UP
LACTATE SERPL-SCNC: 1 MMOL/L — SIGNIFICANT CHANGE UP (ref 0.7–2)
LEUKOCYTE ESTERASE UR-ACNC: ABNORMAL
LIDOCAIN IGE QN: 105 U/L — SIGNIFICANT CHANGE UP (ref 73–393)
LYMPHOCYTES # BLD AUTO: 1 K/UL — SIGNIFICANT CHANGE UP (ref 1–3.3)
LYMPHOCYTES # BLD AUTO: 14.6 % — SIGNIFICANT CHANGE UP (ref 13–44)
MCHC RBC-ENTMCNC: 29.8 PG — SIGNIFICANT CHANGE UP (ref 27–34)
MCHC RBC-ENTMCNC: 33.1 GM/DL — SIGNIFICANT CHANGE UP (ref 32–36)
MCV RBC AUTO: 89.9 FL — SIGNIFICANT CHANGE UP (ref 80–100)
MONOCYTES # BLD AUTO: 0.54 K/UL — SIGNIFICANT CHANGE UP (ref 0–0.9)
MONOCYTES NFR BLD AUTO: 7.9 % — SIGNIFICANT CHANGE UP (ref 2–14)
NEUTROPHILS # BLD AUTO: 5.03 K/UL — SIGNIFICANT CHANGE UP (ref 1.8–7.4)
NEUTROPHILS NFR BLD AUTO: 73.6 % — SIGNIFICANT CHANGE UP (ref 43–77)
NITRITE UR-MCNC: NEGATIVE — SIGNIFICANT CHANGE UP
NRBC # BLD: 0 /100 WBCS — SIGNIFICANT CHANGE UP (ref 0–0)
PH UR: 7 — SIGNIFICANT CHANGE UP (ref 5–8)
PLATELET # BLD AUTO: 171 K/UL — SIGNIFICANT CHANGE UP (ref 150–400)
POTASSIUM SERPL-MCNC: 4.4 MMOL/L — SIGNIFICANT CHANGE UP (ref 3.5–5.3)
POTASSIUM SERPL-SCNC: 4.4 MMOL/L — SIGNIFICANT CHANGE UP (ref 3.5–5.3)
PROT SERPL-MCNC: 7 GM/DL — SIGNIFICANT CHANGE UP (ref 6–8.3)
PROT UR-MCNC: 30 MG/DL
PROTHROM AB SERPL-ACNC: 11.5 SEC — SIGNIFICANT CHANGE UP (ref 10–12.9)
RBC # BLD: 4.84 M/UL — SIGNIFICANT CHANGE UP (ref 4.2–5.8)
RBC # FLD: 14.3 % — SIGNIFICANT CHANGE UP (ref 10.3–14.5)
RBC CASTS # UR COMP ASSIST: NEGATIVE /HPF — SIGNIFICANT CHANGE UP (ref 0–4)
SODIUM SERPL-SCNC: 141 MMOL/L — SIGNIFICANT CHANGE UP (ref 135–145)
SP GR SPEC: 1.01 — SIGNIFICANT CHANGE UP (ref 1.01–1.02)
TROPONIN I SERPL-MCNC: <0.015 NG/ML — SIGNIFICANT CHANGE UP (ref 0.01–0.04)
UROBILINOGEN FLD QL: NEGATIVE MG/DL — SIGNIFICANT CHANGE UP
WBC # BLD: 6.84 K/UL — SIGNIFICANT CHANGE UP (ref 3.8–10.5)
WBC # FLD AUTO: 6.84 K/UL — SIGNIFICANT CHANGE UP (ref 3.8–10.5)
WBC UR QL: SIGNIFICANT CHANGE UP

## 2019-03-19 PROCEDURE — 99285 EMERGENCY DEPT VISIT HI MDM: CPT | Mod: 25

## 2019-03-19 PROCEDURE — 71045 X-RAY EXAM CHEST 1 VIEW: CPT | Mod: 26

## 2019-03-19 PROCEDURE — 70450 CT HEAD/BRAIN W/O DYE: CPT | Mod: 26

## 2019-03-19 PROCEDURE — 74176 CT ABD & PELVIS W/O CONTRAST: CPT | Mod: 26

## 2019-03-19 PROCEDURE — 93010 ELECTROCARDIOGRAM REPORT: CPT

## 2019-03-19 RX ORDER — SODIUM CHLORIDE 9 MG/ML
3 INJECTION INTRAMUSCULAR; INTRAVENOUS; SUBCUTANEOUS ONCE
Qty: 0 | Refills: 0 | Status: COMPLETED | OUTPATIENT
Start: 2019-03-19 | End: 2019-03-19

## 2019-03-19 RX ORDER — SODIUM CHLORIDE 9 MG/ML
500 INJECTION INTRAMUSCULAR; INTRAVENOUS; SUBCUTANEOUS ONCE
Qty: 0 | Refills: 0 | Status: COMPLETED | OUTPATIENT
Start: 2019-03-19 | End: 2019-03-19

## 2019-03-19 RX ADMIN — SODIUM CHLORIDE 500 MILLILITER(S): 9 INJECTION INTRAMUSCULAR; INTRAVENOUS; SUBCUTANEOUS at 14:50

## 2019-03-19 RX ADMIN — SODIUM CHLORIDE 3 MILLILITER(S): 9 INJECTION INTRAMUSCULAR; INTRAVENOUS; SUBCUTANEOUS at 14:50

## 2019-03-19 NOTE — ED PROVIDER NOTE - OBJECTIVE STATEMENT
89 y/o male with a PMHx of Parkinson's, HTN, HLD, CHF, baseline difficulty ambulating presents to the ED c/o weakness in bilateral legs, inability to ambulate x 2 days. +SOB, chills. Pt has also had increase sleepiness, drowsiness. Denies fevers, trouble swallowing. NKDA. PCP: Dr. Cano

## 2019-03-19 NOTE — ED ADULT TRIAGE NOTE - CHIEF COMPLAINT QUOTE
weakness for approx 2 days pt states " I can't move my legs , it's getting harder and harder" pt c/o b/l leg pain , currently uses a segun at home

## 2019-03-19 NOTE — ED PROVIDER NOTE - MUSCULOSKELETAL, MLM
Spine appears normal, range of motion is not limited, no muscle or joint tenderness. PANCHAL x4 no focal swelling or tenderness. LLE distal motor sensory intact +right SLR 2 cm by stretcher. left leg unable to SLR 3/5 motor proximal.

## 2019-03-19 NOTE — ED PROVIDER NOTE - CONSTITUTIONAL, MLM
normal... Elderly white male, awake, alert, oriented to person, place, time/situation and in no apparent distress. +chronically ill

## 2019-03-19 NOTE — ED PROVIDER NOTE - CHPI ED SYMPTOMS POS
[facility] Emergency Department    [place]  [address] [address]    Phone:  [phone]    Fax:  [phone] [facility]   MRN: [id number]    Department:  [facility] Emergency Department   Date of Visit:  4/1 Follow-up with your doctor regarding your glucose over the next few days check glucose daily    Discharge References/Attachments     CONSTIPATION (ADULT) (ENGLISH)    CYST, BAKER'S (ENGLISH)    DIABETES, GENERAL INFORMATION (ENGLISH)      Disclosure     In CHILLS/SHORTNESS OF BREATH/+weakness in bilateral legs IF THERE IS ANY CHANGE OR WORSENING OF YOUR CONDITION, CALL YOUR PRIMARY CARE PHYSICIAN AT ONCE OR RETURN IMMEDIATELY TO THE EMERGENCY DEPARTMENT.     If you have been prescribed any medication(s), please fill your prescription right away and begin taking t [address] to help you get signed up for insurance coverage. [address] is a Federal Navigator program that can help with your Affordable Care Act coverage, as well as all types of Medicaid plans.   To get signed up and covere Enter your [employer] Activation Code exactly as it appears below along with your Zip Code and Date of Birth to complete the sign-up process. If you do not sign up before the expiration date, you must request a new code.     Your unique [employer] Access Code: [email or web address]

## 2019-03-19 NOTE — ED PROVIDER NOTE - PROGRESS NOTE DETAILS
Dr. Leslie:   consult appreciated:  pt has 24/7 home services in place & a segun at home.  Wife agreeable w/ opt D/C back home.

## 2019-03-19 NOTE — ED PROVIDER NOTE - CLINICAL SUMMARY MEDICAL DECISION MAKING FREE TEXT BOX
89 y/o male, multiple PMH including Parkinson dx with worsening LE motor strength, difficulty ambulating, to point wife complaints pt nonambulatory x 2 days. Admits to feeling weka but especially to bilateral LE. No chest pain, fever, fall, LOC. bilateral LE weakness, limited SLR, especially left leg, mild LLQ abd TTP., Plan rectal temp, CXR, CT head, A/P noncon, labs including urine, blood cultures, lipase, lactate, cautious IV fluids, ambulation trial if all studies negative, monitor, observe and reassess.

## 2019-03-19 NOTE — ED PROVIDER NOTE - NEUROLOGICAL, MLM
Alert and oriented x3, no focal deficits, no sensory deficits. Normal speech. CN grossly normal +b/l leg weakness, left greater than right

## 2019-03-20 LAB
CULTURE RESULTS: SIGNIFICANT CHANGE UP
SPECIMEN SOURCE: SIGNIFICANT CHANGE UP

## 2019-03-24 LAB
CULTURE RESULTS: SIGNIFICANT CHANGE UP
CULTURE RESULTS: SIGNIFICANT CHANGE UP
SPECIMEN SOURCE: SIGNIFICANT CHANGE UP
SPECIMEN SOURCE: SIGNIFICANT CHANGE UP

## 2019-04-03 ENCOUNTER — EMERGENCY (EMERGENCY)
Facility: HOSPITAL | Age: 84
LOS: 0 days | Discharge: HOME | End: 2019-04-04
Attending: EMERGENCY MEDICINE | Admitting: EMERGENCY MEDICINE
Payer: MEDICARE

## 2019-04-03 VITALS
RESPIRATION RATE: 19 BRPM | HEART RATE: 86 BPM | WEIGHT: 199.96 LBS | SYSTOLIC BLOOD PRESSURE: 107 MMHG | DIASTOLIC BLOOD PRESSURE: 61 MMHG | TEMPERATURE: 97 F | HEIGHT: 73 IN | OXYGEN SATURATION: 93 %

## 2019-04-03 DIAGNOSIS — Z95.0 PRESENCE OF CARDIAC PACEMAKER: Chronic | ICD-10-CM

## 2019-04-03 DIAGNOSIS — F02.80 DEMENTIA IN OTHER DISEASES CLASSIFIED ELSEWHERE, UNSPECIFIED SEVERITY, WITHOUT BEHAVIORAL DISTURBANCE, PSYCHOTIC DISTURBANCE, MOOD DISTURBANCE, AND ANXIETY: ICD-10-CM

## 2019-04-03 DIAGNOSIS — I12.9 HYPERTENSIVE CHRONIC KIDNEY DISEASE WITH STAGE 1 THROUGH STAGE 4 CHRONIC KIDNEY DISEASE, OR UNSPECIFIED CHRONIC KIDNEY DISEASE: ICD-10-CM

## 2019-04-03 DIAGNOSIS — Z98.61 CORONARY ANGIOPLASTY STATUS: Chronic | ICD-10-CM

## 2019-04-03 DIAGNOSIS — N18.3 CHRONIC KIDNEY DISEASE, STAGE 3 (MODERATE): ICD-10-CM

## 2019-04-03 DIAGNOSIS — G20 PARKINSON'S DISEASE: ICD-10-CM

## 2019-04-03 DIAGNOSIS — Z95.0 PRESENCE OF CARDIAC PACEMAKER: ICD-10-CM

## 2019-04-03 DIAGNOSIS — R53.1 WEAKNESS: ICD-10-CM

## 2019-04-03 DIAGNOSIS — E11.22 TYPE 2 DIABETES MELLITUS WITH DIABETIC CHRONIC KIDNEY DISEASE: ICD-10-CM

## 2019-04-03 LAB
ALBUMIN SERPL ELPH-MCNC: 3.2 G/DL — LOW (ref 3.3–5)
ALP SERPL-CCNC: 87 U/L — SIGNIFICANT CHANGE UP (ref 40–120)
ALT FLD-CCNC: 10 U/L — LOW (ref 12–78)
ANION GAP SERPL CALC-SCNC: 6 MMOL/L — SIGNIFICANT CHANGE UP (ref 5–17)
APPEARANCE UR: CLEAR — SIGNIFICANT CHANGE UP
AST SERPL-CCNC: 19 U/L — SIGNIFICANT CHANGE UP (ref 15–37)
BACTERIA # UR AUTO: ABNORMAL
BASOPHILS # BLD AUTO: 0.08 K/UL — SIGNIFICANT CHANGE UP (ref 0–0.2)
BASOPHILS NFR BLD AUTO: 1 % — SIGNIFICANT CHANGE UP (ref 0–2)
BILIRUB SERPL-MCNC: 0.4 MG/DL — SIGNIFICANT CHANGE UP (ref 0.2–1.2)
BILIRUB UR-MCNC: NEGATIVE — SIGNIFICANT CHANGE UP
BUN SERPL-MCNC: 38 MG/DL — HIGH (ref 7–23)
CALCIUM SERPL-MCNC: 9 MG/DL — SIGNIFICANT CHANGE UP (ref 8.5–10.1)
CHLORIDE SERPL-SCNC: 105 MMOL/L — SIGNIFICANT CHANGE UP (ref 96–108)
CO2 SERPL-SCNC: 27 MMOL/L — SIGNIFICANT CHANGE UP (ref 22–31)
COLOR SPEC: YELLOW — SIGNIFICANT CHANGE UP
CREAT SERPL-MCNC: 1.91 MG/DL — HIGH (ref 0.5–1.3)
DIFF PNL FLD: NEGATIVE — SIGNIFICANT CHANGE UP
EOSINOPHIL # BLD AUTO: 0.24 K/UL — SIGNIFICANT CHANGE UP (ref 0–0.5)
EOSINOPHIL NFR BLD AUTO: 2.9 % — SIGNIFICANT CHANGE UP (ref 0–6)
EPI CELLS # UR: SIGNIFICANT CHANGE UP
GLUCOSE SERPL-MCNC: 153 MG/DL — HIGH (ref 70–99)
GLUCOSE UR QL: NEGATIVE MG/DL — SIGNIFICANT CHANGE UP
HCT VFR BLD CALC: 43 % — SIGNIFICANT CHANGE UP (ref 39–50)
HGB BLD-MCNC: 14.1 G/DL — SIGNIFICANT CHANGE UP (ref 13–17)
HYALINE CASTS # UR AUTO: ABNORMAL /LPF
IMM GRANULOCYTES NFR BLD AUTO: 0.5 % — SIGNIFICANT CHANGE UP (ref 0–1.5)
KETONES UR-MCNC: NEGATIVE — SIGNIFICANT CHANGE UP
LEUKOCYTE ESTERASE UR-ACNC: ABNORMAL
LYMPHOCYTES # BLD AUTO: 1.13 K/UL — SIGNIFICANT CHANGE UP (ref 1–3.3)
LYMPHOCYTES # BLD AUTO: 13.4 % — SIGNIFICANT CHANGE UP (ref 13–44)
MAGNESIUM SERPL-MCNC: 2 MG/DL — SIGNIFICANT CHANGE UP (ref 1.6–2.6)
MCHC RBC-ENTMCNC: 29.9 PG — SIGNIFICANT CHANGE UP (ref 27–34)
MCHC RBC-ENTMCNC: 32.8 GM/DL — SIGNIFICANT CHANGE UP (ref 32–36)
MCV RBC AUTO: 91.1 FL — SIGNIFICANT CHANGE UP (ref 80–100)
MONOCYTES # BLD AUTO: 0.78 K/UL — SIGNIFICANT CHANGE UP (ref 0–0.9)
MONOCYTES NFR BLD AUTO: 9.3 % — SIGNIFICANT CHANGE UP (ref 2–14)
NEUTROPHILS # BLD AUTO: 6.14 K/UL — SIGNIFICANT CHANGE UP (ref 1.8–7.4)
NEUTROPHILS NFR BLD AUTO: 72.9 % — SIGNIFICANT CHANGE UP (ref 43–77)
NITRITE UR-MCNC: NEGATIVE — SIGNIFICANT CHANGE UP
NRBC # BLD: 0 /100 WBCS — SIGNIFICANT CHANGE UP (ref 0–0)
PH UR: 5 — SIGNIFICANT CHANGE UP (ref 5–8)
PLATELET # BLD AUTO: 180 K/UL — SIGNIFICANT CHANGE UP (ref 150–400)
POTASSIUM SERPL-MCNC: 4.1 MMOL/L — SIGNIFICANT CHANGE UP (ref 3.5–5.3)
POTASSIUM SERPL-SCNC: 4.1 MMOL/L — SIGNIFICANT CHANGE UP (ref 3.5–5.3)
PROT SERPL-MCNC: 6.6 GM/DL — SIGNIFICANT CHANGE UP (ref 6–8.3)
PROT UR-MCNC: 100 MG/DL
RBC # BLD: 4.72 M/UL — SIGNIFICANT CHANGE UP (ref 4.2–5.8)
RBC # FLD: 14.4 % — SIGNIFICANT CHANGE UP (ref 10.3–14.5)
RBC CASTS # UR COMP ASSIST: NEGATIVE /HPF — SIGNIFICANT CHANGE UP (ref 0–4)
SODIUM SERPL-SCNC: 138 MMOL/L — SIGNIFICANT CHANGE UP (ref 135–145)
SP GR SPEC: 1.01 — SIGNIFICANT CHANGE UP (ref 1.01–1.02)
UROBILINOGEN FLD QL: NEGATIVE MG/DL — SIGNIFICANT CHANGE UP
WBC # BLD: 8.41 K/UL — SIGNIFICANT CHANGE UP (ref 3.8–10.5)
WBC # FLD AUTO: 8.41 K/UL — SIGNIFICANT CHANGE UP (ref 3.8–10.5)
WBC UR QL: SIGNIFICANT CHANGE UP

## 2019-04-03 PROCEDURE — 99284 EMERGENCY DEPT VISIT MOD MDM: CPT | Mod: 25

## 2019-04-03 PROCEDURE — 70450 CT HEAD/BRAIN W/O DYE: CPT | Mod: 26

## 2019-04-03 PROCEDURE — 71045 X-RAY EXAM CHEST 1 VIEW: CPT | Mod: 26

## 2019-04-03 PROCEDURE — 93010 ELECTROCARDIOGRAM REPORT: CPT

## 2019-04-03 RX ORDER — CARBIDOPA AND LEVODOPA 25; 100 MG/1; MG/1
1 TABLET ORAL ONCE
Qty: 0 | Refills: 0 | Status: COMPLETED | OUTPATIENT
Start: 2019-04-03 | End: 2019-04-03

## 2019-04-03 RX ORDER — SODIUM CHLORIDE 9 MG/ML
500 INJECTION INTRAMUSCULAR; INTRAVENOUS; SUBCUTANEOUS ONCE
Qty: 0 | Refills: 0 | Status: COMPLETED | OUTPATIENT
Start: 2019-04-03 | End: 2019-04-03

## 2019-04-03 RX ADMIN — SODIUM CHLORIDE 500 MILLILITER(S): 9 INJECTION INTRAMUSCULAR; INTRAVENOUS; SUBCUTANEOUS at 19:43

## 2019-04-03 RX ADMIN — CARBIDOPA AND LEVODOPA 1 TABLET(S): 25; 100 TABLET ORAL at 23:26

## 2019-04-03 RX ADMIN — SODIUM CHLORIDE 1000 MILLILITER(S): 9 INJECTION INTRAMUSCULAR; INTRAVENOUS; SUBCUTANEOUS at 19:14

## 2019-04-03 NOTE — ED PROVIDER NOTE - OBJECTIVE STATEMENT
89 y/o M with a PMHx of Dementia, Parkinson's Disease, DM, HTN, s/p Pacemaker presenting to the ED with wife and home aide for evaluation of bilateral LE weakness, worsened decreased PO intake x weeks. Hx obtained via wife at bedside. Pt was seen in ED on 03/19/2019 for evaluation of LE weakness. Discharged home after negative workup. Per wife, pt has been experiencing persistent difficulty with ambulation secondary to LE weakness and worsening decreased PO intake. Brought pt into ED for reevaluation. Unable to obtain full HPI secondary to pt's hx of dementia.

## 2019-04-03 NOTE — ED PROVIDER NOTE - CLINICAL SUMMARY MEDICAL DECISION MAKING FREE TEXT BOX
89 y/o M with LE weakness. Plan: workup to r/o metabolic disturbance vs. infectious vs. ischemic cause, get EKG, check pacemaker, blood work, urinalysis with culture, fluids, and reassess.

## 2019-04-03 NOTE — ED PROVIDER NOTE - PROGRESS NOTE DETAILS
Pt's wife states she is okay with him being discharged but wants him to stay overnight to come home in the AM.  Santiago Rodrigues, DO

## 2019-04-03 NOTE — ED PROVIDER NOTE - ENMT, MLM
Airway patent, Nasal mucosa clear. Moderately dry mucous membranes. Throat has no vesicles, no oropharyngeal exudates and uvula is midline.

## 2019-04-04 VITALS
SYSTOLIC BLOOD PRESSURE: 154 MMHG | OXYGEN SATURATION: 95 % | HEART RATE: 70 BPM | RESPIRATION RATE: 22 BRPM | TEMPERATURE: 98 F | DIASTOLIC BLOOD PRESSURE: 68 MMHG

## 2019-04-04 NOTE — ED ADULT NURSE REASSESSMENT NOTE - NS ED NURSE REASSESS COMMENT FT1
patient constantly taking ekg leads off, redirected patient multiple times, patient non cooperative.

## 2019-04-05 LAB
CULTURE RESULTS: NO GROWTH — SIGNIFICANT CHANGE UP
SPECIMEN SOURCE: SIGNIFICANT CHANGE UP

## 2019-04-09 NOTE — H&P ADULT - HISTORY OF PRESENT ILLNESS
Subjective   Patient ID: Yarely is a 50 year old female.    Chief Complaint   Patient presents with   • Annual Exam     Patient comes in for evaluation of hypothyroidism.  She has been taking levothyroxine 88 mcg every day for past several years.  She is sleeps well, has good energy and exercise and regular basis.  She has intermittent headache for which she does not take any medication but drinks water.  She was advised to keep a diary to see how often, how severe and what time of the day she gets headache more often.  Changing pillow would be an option to see whether headache is due to neck position.  She denies cough, runny nose, chest pain on exertion, abdominal pain, heartburn, dysuria, nocturia or changes in the bowel habits.  Her medications were reviewed.  She does not need any prescription at this point in time.  He was advised to get routine labs done at outside lab where he will be somewhat cheaper because she does not have health insurance.      Past Medical History:   Diagnosis Date   • Hypothyroid      Patient Active Problem List   Diagnosis   • Allergic rhinitis   • Hypothyroidism   • Epigastric pain   • Annual physical exam     No past surgical history on file.  Family History   Problem Relation Age of Onset   • Patient is unaware of any medical problems Mother    • Seizure Disorder Father      Social History     Tobacco Use   • Smoking status: Never Smoker   • Smokeless tobacco: Never Used   Substance Use Topics   • Alcohol use: No     Frequency: Never   • Drug use: Not on file     Current Outpatient Medications   Medication Sig Dispense Refill   • triamcinolone (ARISTOCORT) 0.1 % cream Apply topically 2 times daily. 30 g 1   • famotidine (PEPCID) 40 MG tablet TAKE 1 TABLET AT BEDTIME.     • SYNTHROID 88 MCG tablet TAKE 1 TABLET BY MOUTH ONCE DAILY AS DIRECTED 90 tablet 1     No current facility-administered medications for this visit.      ALLERGIES:   Allergen Reactions   • Seasonal Other (See  Comments)     Sneezing, itchy eyes and nose.     Dust     No results found for this visit on 04/09/19.        Review of Systems   Constitutional: Negative.    HENT: Negative.    Eyes: Negative.    Respiratory: Negative.    Cardiovascular: Negative.    Gastrointestinal: Negative.    Endocrine: Negative.    Genitourinary: Negative.    Musculoskeletal: Negative.    Skin: Negative.    Allergic/Immunologic: Negative.    Neurological: Negative.    Hematological: Negative.    Psychiatric/Behavioral: Negative.          Objective   Physical Exam   Constitutional: She is oriented to person, place, and time. She appears well-developed and well-nourished.   HENT:   Head: Normocephalic and atraumatic.   Right Ear: External ear normal.   Left Ear: External ear normal.   Eyes: Pupils are equal, round, and reactive to light. Conjunctivae and EOM are normal.   Neck: Normal range of motion. Neck supple.   Cardiovascular: Normal rate, regular rhythm, normal heart sounds and intact distal pulses.   Pulmonary/Chest: Effort normal and breath sounds normal.   Abdominal: Soft. Bowel sounds are normal.   Genitourinary: Rectum normal, prostate normal and penis normal.   Musculoskeletal: Normal range of motion.   Neurological: She is alert and oriented to person, place, and time. She has normal reflexes.   Skin: Skin is warm and dry.   Psychiatric: She has a normal mood and affect. Her behavior is normal. Judgment and thought content normal.   Nursing note and vitals reviewed.    Vitals:    04/09/19 1059   BP: 121/63   Pulse: 60   Weight: 62.6 kg (138 lb 1.9 oz)   Height: 5' 4.5\" (1.638 m)       Recent Labs  No visits with results within 8 Week(s) from this visit.   Latest known visit with results is:   No results found for any previous visit.       Recent Imaging  No images are attached to the encounter.      Assessment   No problem-specific Assessment & Plan notes found for this encounter.        No follow-ups on file.   89 year old male with Pertinent hx of CAD s/p stents, DM and HTN presents to ED c/o of sudden onset chest pressure x1 day. Pain began while pt was sleeping and was primarily in the left chest wall. Pain was worrisome for about 90 minutes then subsided. Reports ASHFORD(ongoing issue), PND and generalized body aches. Denies any nausea, vomiting, coughing, runny nose, sick contacts, radiation of pain , or dizziness. Pt took aspirin at home and was given nitroglycerin en route to ED. Pt pain free at the time of evaluation

## 2019-04-11 ENCOUNTER — INPATIENT (INPATIENT)
Facility: HOSPITAL | Age: 84
LOS: 3 days | Discharge: HOSPICE | End: 2019-04-15
Attending: INTERNAL MEDICINE | Admitting: INTERNAL MEDICINE
Payer: COMMERCIAL

## 2019-04-11 VITALS
OXYGEN SATURATION: 88 % | DIASTOLIC BLOOD PRESSURE: 47 MMHG | SYSTOLIC BLOOD PRESSURE: 90 MMHG | TEMPERATURE: 98 F | WEIGHT: 160.06 LBS | HEART RATE: 86 BPM | RESPIRATION RATE: 15 BRPM

## 2019-04-11 DIAGNOSIS — Z98.61 CORONARY ANGIOPLASTY STATUS: Chronic | ICD-10-CM

## 2019-04-11 DIAGNOSIS — Z95.0 PRESENCE OF CARDIAC PACEMAKER: Chronic | ICD-10-CM

## 2019-04-11 LAB
ALBUMIN SERPL ELPH-MCNC: 2.6 G/DL — LOW (ref 3.3–5)
ALP SERPL-CCNC: 101 U/L — SIGNIFICANT CHANGE UP (ref 40–120)
ALT FLD-CCNC: 17 U/L — SIGNIFICANT CHANGE UP (ref 12–78)
ANION GAP SERPL CALC-SCNC: 8 MMOL/L — SIGNIFICANT CHANGE UP (ref 5–17)
APPEARANCE UR: CLEAR — SIGNIFICANT CHANGE UP
APTT BLD: 35.2 SEC — SIGNIFICANT CHANGE UP (ref 27.5–36.3)
AST SERPL-CCNC: 36 U/L — SIGNIFICANT CHANGE UP (ref 15–37)
BACTERIA # UR AUTO: ABNORMAL
BASOPHILS # BLD AUTO: 0.09 K/UL — SIGNIFICANT CHANGE UP (ref 0–0.2)
BASOPHILS NFR BLD AUTO: 0.8 % — SIGNIFICANT CHANGE UP (ref 0–2)
BILIRUB SERPL-MCNC: 0.4 MG/DL — SIGNIFICANT CHANGE UP (ref 0.2–1.2)
BILIRUB UR-MCNC: NEGATIVE — SIGNIFICANT CHANGE UP
BUN SERPL-MCNC: 35 MG/DL — HIGH (ref 7–23)
CALCIUM SERPL-MCNC: 8.4 MG/DL — LOW (ref 8.5–10.1)
CHLORIDE SERPL-SCNC: 109 MMOL/L — HIGH (ref 96–108)
CO2 SERPL-SCNC: 19 MMOL/L — LOW (ref 22–31)
COLOR SPEC: YELLOW — SIGNIFICANT CHANGE UP
COMMENT - URINE: SIGNIFICANT CHANGE UP
CREAT SERPL-MCNC: 1.83 MG/DL — HIGH (ref 0.5–1.3)
DIFF PNL FLD: ABNORMAL
EOSINOPHIL # BLD AUTO: 0.18 K/UL — SIGNIFICANT CHANGE UP (ref 0–0.5)
EOSINOPHIL NFR BLD AUTO: 1.6 % — SIGNIFICANT CHANGE UP (ref 0–6)
EPI CELLS # UR: SIGNIFICANT CHANGE UP
GLUCOSE SERPL-MCNC: 349 MG/DL — HIGH (ref 70–99)
GLUCOSE UR QL: 1000 MG/DL
HCT VFR BLD CALC: 45.8 % — SIGNIFICANT CHANGE UP (ref 39–50)
HGB BLD-MCNC: 15.1 G/DL — SIGNIFICANT CHANGE UP (ref 13–17)
HYALINE CASTS # UR AUTO: ABNORMAL /LPF
IMM GRANULOCYTES NFR BLD AUTO: 0.5 % — SIGNIFICANT CHANGE UP (ref 0–1.5)
INR BLD: 1.06 RATIO — SIGNIFICANT CHANGE UP (ref 0.88–1.16)
KETONES UR-MCNC: NEGATIVE — SIGNIFICANT CHANGE UP
LACTATE SERPL-SCNC: 2 MMOL/L — SIGNIFICANT CHANGE UP (ref 0.7–2)
LEUKOCYTE ESTERASE UR-ACNC: NEGATIVE — SIGNIFICANT CHANGE UP
LYMPHOCYTES # BLD AUTO: 1.14 K/UL — SIGNIFICANT CHANGE UP (ref 1–3.3)
LYMPHOCYTES # BLD AUTO: 10.1 % — LOW (ref 13–44)
MCHC RBC-ENTMCNC: 30 PG — SIGNIFICANT CHANGE UP (ref 27–34)
MCHC RBC-ENTMCNC: 33 GM/DL — SIGNIFICANT CHANGE UP (ref 32–36)
MCV RBC AUTO: 90.9 FL — SIGNIFICANT CHANGE UP (ref 80–100)
MONOCYTES # BLD AUTO: 0.73 K/UL — SIGNIFICANT CHANGE UP (ref 0–0.9)
MONOCYTES NFR BLD AUTO: 6.4 % — SIGNIFICANT CHANGE UP (ref 2–14)
NEUTROPHILS # BLD AUTO: 9.12 K/UL — HIGH (ref 1.8–7.4)
NEUTROPHILS NFR BLD AUTO: 80.6 % — HIGH (ref 43–77)
NITRITE UR-MCNC: NEGATIVE — SIGNIFICANT CHANGE UP
NRBC # BLD: 0 /100 WBCS — SIGNIFICANT CHANGE UP (ref 0–0)
PH UR: 5 — SIGNIFICANT CHANGE UP (ref 5–8)
PLATELET # BLD AUTO: 189 K/UL — SIGNIFICANT CHANGE UP (ref 150–400)
POTASSIUM SERPL-MCNC: 4.3 MMOL/L — SIGNIFICANT CHANGE UP (ref 3.5–5.3)
POTASSIUM SERPL-SCNC: 4.3 MMOL/L — SIGNIFICANT CHANGE UP (ref 3.5–5.3)
PROT SERPL-MCNC: 6 GM/DL — SIGNIFICANT CHANGE UP (ref 6–8.3)
PROT UR-MCNC: 100 MG/DL
PROTHROM AB SERPL-ACNC: 11.8 SEC — SIGNIFICANT CHANGE UP (ref 10–12.9)
RAPID RVP RESULT: SIGNIFICANT CHANGE UP
RBC # BLD: 5.04 M/UL — SIGNIFICANT CHANGE UP (ref 4.2–5.8)
RBC # FLD: 14.3 % — SIGNIFICANT CHANGE UP (ref 10.3–14.5)
RBC CASTS # UR COMP ASSIST: SIGNIFICANT CHANGE UP /HPF (ref 0–4)
SODIUM SERPL-SCNC: 136 MMOL/L — SIGNIFICANT CHANGE UP (ref 135–145)
SP GR SPEC: 1.01 — SIGNIFICANT CHANGE UP (ref 1.01–1.02)
TROPONIN I SERPL-MCNC: 0.02 NG/ML — SIGNIFICANT CHANGE UP (ref 0.01–0.04)
TROPONIN I SERPL-MCNC: 0.02 NG/ML — SIGNIFICANT CHANGE UP (ref 0.01–0.04)
UROBILINOGEN FLD QL: NEGATIVE MG/DL — SIGNIFICANT CHANGE UP
WBC # BLD: 11.32 K/UL — HIGH (ref 3.8–10.5)
WBC # FLD AUTO: 11.32 K/UL — HIGH (ref 3.8–10.5)
WBC UR QL: SIGNIFICANT CHANGE UP

## 2019-04-11 PROCEDURE — 99285 EMERGENCY DEPT VISIT HI MDM: CPT

## 2019-04-11 PROCEDURE — 71045 X-RAY EXAM CHEST 1 VIEW: CPT | Mod: 26

## 2019-04-11 PROCEDURE — 70450 CT HEAD/BRAIN W/O DYE: CPT | Mod: 26

## 2019-04-11 PROCEDURE — 93010 ELECTROCARDIOGRAM REPORT: CPT

## 2019-04-11 RX ORDER — METOPROLOL TARTRATE 50 MG
100 TABLET ORAL DAILY
Qty: 0 | Refills: 0 | Status: DISCONTINUED | OUTPATIENT
Start: 2019-04-11 | End: 2019-04-15

## 2019-04-11 RX ORDER — AMLODIPINE BESYLATE 2.5 MG/1
10 TABLET ORAL DAILY
Qty: 0 | Refills: 0 | Status: DISCONTINUED | OUTPATIENT
Start: 2019-04-11 | End: 2019-04-15

## 2019-04-11 RX ORDER — RISPERIDONE 4 MG/1
0.5 TABLET ORAL AT BEDTIME
Qty: 0 | Refills: 0 | Status: DISCONTINUED | OUTPATIENT
Start: 2019-04-11 | End: 2019-04-15

## 2019-04-11 RX ORDER — ATORVASTATIN CALCIUM 80 MG/1
10 TABLET, FILM COATED ORAL AT BEDTIME
Qty: 0 | Refills: 0 | Status: DISCONTINUED | OUTPATIENT
Start: 2019-04-11 | End: 2019-04-15

## 2019-04-11 RX ORDER — SODIUM CHLORIDE 9 MG/ML
1000 INJECTION, SOLUTION INTRAVENOUS
Qty: 0 | Refills: 0 | Status: DISCONTINUED | OUTPATIENT
Start: 2019-04-11 | End: 2019-04-13

## 2019-04-11 RX ORDER — QUETIAPINE FUMARATE 200 MG/1
25 TABLET, FILM COATED ORAL
Qty: 0 | Refills: 0 | Status: DISCONTINUED | OUTPATIENT
Start: 2019-04-11 | End: 2019-04-15

## 2019-04-11 RX ORDER — SODIUM CHLORIDE 9 MG/ML
2300 INJECTION, SOLUTION INTRAVENOUS ONCE
Qty: 0 | Refills: 0 | Status: COMPLETED | OUTPATIENT
Start: 2019-04-11 | End: 2019-04-11

## 2019-04-11 RX ORDER — DEXTROSE 50 % IN WATER 50 %
15 SYRINGE (ML) INTRAVENOUS ONCE
Qty: 0 | Refills: 0 | Status: DISCONTINUED | OUTPATIENT
Start: 2019-04-11 | End: 2019-04-13

## 2019-04-11 RX ORDER — ASPIRIN/CALCIUM CARB/MAGNESIUM 324 MG
81 TABLET ORAL DAILY
Qty: 0 | Refills: 0 | Status: DISCONTINUED | OUTPATIENT
Start: 2019-04-11 | End: 2019-04-15

## 2019-04-11 RX ORDER — PIPERACILLIN AND TAZOBACTAM 4; .5 G/20ML; G/20ML
3.38 INJECTION, POWDER, LYOPHILIZED, FOR SOLUTION INTRAVENOUS ONCE
Qty: 0 | Refills: 0 | Status: COMPLETED | OUTPATIENT
Start: 2019-04-11 | End: 2019-04-11

## 2019-04-11 RX ORDER — CHOLECALCIFEROL (VITAMIN D3) 125 MCG
1000 CAPSULE ORAL
Qty: 0 | Refills: 0 | Status: DISCONTINUED | OUTPATIENT
Start: 2019-04-11 | End: 2019-04-15

## 2019-04-11 RX ORDER — DEXTROSE 50 % IN WATER 50 %
25 SYRINGE (ML) INTRAVENOUS ONCE
Qty: 0 | Refills: 0 | Status: DISCONTINUED | OUTPATIENT
Start: 2019-04-11 | End: 2019-04-13

## 2019-04-11 RX ORDER — ACETAMINOPHEN 500 MG
650 TABLET ORAL EVERY 6 HOURS
Qty: 0 | Refills: 0 | Status: DISCONTINUED | OUTPATIENT
Start: 2019-04-11 | End: 2019-04-15

## 2019-04-11 RX ORDER — SODIUM CHLORIDE 9 MG/ML
1000 INJECTION INTRAMUSCULAR; INTRAVENOUS; SUBCUTANEOUS ONCE
Qty: 0 | Refills: 0 | Status: COMPLETED | OUTPATIENT
Start: 2019-04-11 | End: 2019-04-11

## 2019-04-11 RX ORDER — GLUCAGON INJECTION, SOLUTION 0.5 MG/.1ML
1 INJECTION, SOLUTION SUBCUTANEOUS ONCE
Qty: 0 | Refills: 0 | Status: DISCONTINUED | OUTPATIENT
Start: 2019-04-11 | End: 2019-04-13

## 2019-04-11 RX ORDER — VANCOMYCIN HCL 1 G
1000 VIAL (EA) INTRAVENOUS ONCE
Qty: 0 | Refills: 0 | Status: COMPLETED | OUTPATIENT
Start: 2019-04-11 | End: 2019-04-11

## 2019-04-11 RX ORDER — CLOPIDOGREL BISULFATE 75 MG/1
75 TABLET, FILM COATED ORAL DAILY
Qty: 0 | Refills: 0 | Status: DISCONTINUED | OUTPATIENT
Start: 2019-04-11 | End: 2019-04-15

## 2019-04-11 RX ORDER — CARBIDOPA AND LEVODOPA 25; 100 MG/1; MG/1
1 TABLET ORAL
Qty: 0 | Refills: 0 | COMMUNITY

## 2019-04-11 RX ORDER — INSULIN LISPRO 100/ML
VIAL (ML) SUBCUTANEOUS
Qty: 0 | Refills: 0 | Status: DISCONTINUED | OUTPATIENT
Start: 2019-04-11 | End: 2019-04-13

## 2019-04-11 RX ORDER — SODIUM CHLORIDE 9 MG/ML
1000 INJECTION INTRAMUSCULAR; INTRAVENOUS; SUBCUTANEOUS
Qty: 0 | Refills: 0 | Status: DISCONTINUED | OUTPATIENT
Start: 2019-04-11 | End: 2019-04-12

## 2019-04-11 RX ORDER — CARBIDOPA AND LEVODOPA 25; 100 MG/1; MG/1
1 TABLET ORAL THREE TIMES A DAY
Qty: 0 | Refills: 0 | Status: DISCONTINUED | OUTPATIENT
Start: 2019-04-11 | End: 2019-04-15

## 2019-04-11 RX ORDER — HEPARIN SODIUM 5000 [USP'U]/ML
5000 INJECTION INTRAVENOUS; SUBCUTANEOUS EVERY 8 HOURS
Qty: 0 | Refills: 0 | Status: DISCONTINUED | OUTPATIENT
Start: 2019-04-11 | End: 2019-04-15

## 2019-04-11 RX ORDER — DEXTROSE 50 % IN WATER 50 %
12.5 SYRINGE (ML) INTRAVENOUS ONCE
Qty: 0 | Refills: 0 | Status: DISCONTINUED | OUTPATIENT
Start: 2019-04-11 | End: 2019-04-13

## 2019-04-11 RX ADMIN — PIPERACILLIN AND TAZOBACTAM 200 GRAM(S): 4; .5 INJECTION, POWDER, LYOPHILIZED, FOR SOLUTION INTRAVENOUS at 18:05

## 2019-04-11 RX ADMIN — SODIUM CHLORIDE 1000 MILLILITER(S): 9 INJECTION INTRAMUSCULAR; INTRAVENOUS; SUBCUTANEOUS at 18:27

## 2019-04-11 RX ADMIN — SODIUM CHLORIDE 2300 MILLILITER(S): 9 INJECTION, SOLUTION INTRAVENOUS at 17:59

## 2019-04-11 RX ADMIN — Medication 250 MILLIGRAM(S): at 18:26

## 2019-04-11 NOTE — H&P ADULT - NSHPPHYSICALEXAM_GEN_ALL_CORE
Vital Signs Last 24 Hrs  T(C): 36.4 (11 Apr 2019 22:47), Max: 36.9 (11 Apr 2019 18:00)  T(F): 97.5 (11 Apr 2019 22:47), Max: 98.5 (11 Apr 2019 18:00)  HR: 72 (11 Apr 2019 22:47) (72 - 86)  BP: 134/78 (11 Apr 2019 22:47) (90/47 - 162/80)  RR: 16 (11 Apr 2019 22:47) (15 - 18)  SpO2: 97% (11 Apr 2019 22:47) (88% - 98%)

## 2019-04-11 NOTE — H&P ADULT - HISTORY OF PRESENT ILLNESS
89 y/o male with PMHx of dementia, gout, CKD, DM, Parkinson's disease, macular degeneration, HLD, CHF, HTN, s/p pacemaker, s/p percutaneous transluminal coronary angioplasty with stent presents to the ED c/o lethargy, AMS x1 month. Pt was seen at [facility] on 4/3/19 c/o bilateral LE weakness and decreased PO xweeks, discharged with unremarkable workup. Pt's wife reports pt has been screaming, due to unknown source of pain. +decreased urine output since yesterday +decreased appetite. Pt's PCP Dr. Lipscomb's assistant visited pt at home today and evaluated him 89 yo male with PMHx of dementia, gout, CKD, DM, Parkinson's disease, macular degeneration, HLD, CHF, HTN, s/p pacemaker, s/p percutaneous transluminal coronary angioplasty with stent presents to the ED with complain of lethargy, and AMS x1 month. Patient was seen at [facility] on 4/3/19 c/o bilateral LE weakness and decreased PO xweeks, discharged with unremarkable workup. Patient's wife reports that patient has been screaming, due to unknown source of pain. he also has +decreased urine output since yesterday and +decreased appetite.     Family Hx:  patient is unable to provide detail family history due to his clinical status.

## 2019-04-11 NOTE — ED PROVIDER NOTE - CLINICAL SUMMARY MEDICAL DECISION MAKING FREE TEXT BOX
89 y/o male with PMHx of dementia, gout, CKD, DM, Parkinson's disease, macular degeneration, HLD, CHF, HTN, s/p pacemaker, s/p percutaneous transluminal coronary angioplasty with stent presents to the ED c/o lethargy, AMS x1 month. Pt presents with progressively worsening weakness and declining mental status over the past month. Differential dx includes GARRETT/dehydration/metabolic disturbance/infectious/ischemic causes. Plan labs, EKG, CT head, CXR, hydration,

## 2019-04-11 NOTE — ED PROVIDER NOTE - FAMILY DETAILS FREE TEXT FOR MDM ADDL HISTORY OBTAINED FROM QUESTION
Pt's wife reports pt has been screaming, due to unknown source of pain. +decreased urine output since yesterday. Pt's PCP Dr. Lipscomb's assistant visited pt at home today and evaluated him.

## 2019-04-11 NOTE — H&P ADULT - NSICDXPASTMEDICALHX_GEN_ALL_CORE_FT
PAST MEDICAL HISTORY:  CKD (chronic kidney disease) stage 3, GFR 30-59 ml/min     Diabetes diet controlled    Falls last fall in January    Gout     Hypertension     Macular degeneration     Parkinson disease

## 2019-04-11 NOTE — ED PROVIDER NOTE - ENMT, MLM
Airway patent, Nasal mucosa clear. Throat has no vesicles, no oropharyngeal exudates and uvula is midline. +dry mucous membranes

## 2019-04-11 NOTE — PHARMACOTHERAPY INTERVENTION NOTE - COMMENTS
med history complete, patient unable to provide history, confirmed medications with doctor first med profile

## 2019-04-11 NOTE — H&P ADULT - ASSESSMENT
89 yo  male brought in due to altered mental status.    A/P:    1.  Altered mental status  Encephalopathy  Dehydration   -most likely metabolic reason or worsening dementia  -got IVF in ED, will keep on mild hydration  -follow clinically  -will not continue any antibiotics for now, no SIRS or Sepsis  -follow cultures    2.  CKD stage 4  -will follow Is and Os     3.  DM  -follow Dxt  -keep on ISS    4.  Heparin for DVT ppx    5.  Code status: Full code.

## 2019-04-11 NOTE — ED PROVIDER NOTE - CARDIAC, MLM
Normal rate.  Heart sounds S1, S2.  No murmurs, rubs or gallops. +irregular rhythm + bilateral lower extremities pitting edema

## 2019-04-11 NOTE — ED ADULT NURSE NOTE - NSIMPLEMENTINTERV_GEN_ALL_ED
Implemented All Universal Safety Interventions:  [place] to call system. Call bell, personal items and telephone within reach. Instruct patient to call for assistance. Room bathroom lighting operational. Non-slip footwear when patient is off stretcher. Physically safe environment: no spills, clutter or unnecessary equipment. Stretcher in lowest position, wheels locked, appropriate side rails in place.

## 2019-04-11 NOTE — ED PROVIDER NOTE - OBJECTIVE STATEMENT
89 y/o male with PMHx of dementia, gout, CKD, DM, Parkinson's disease, macular degeneration, HLD, CHF, HTN, s/p pacemaker, s/p percutaneous transluminal coronary angioplasty with stent presents to the ED c/o lethargy, AMS x1 month. Pt was seen at [facility] on 4/3/19 c/o bilateral LE weakness and decreased PO xweeks, discharged with unremarkable workup. Pt's wife reports pt has been screaming, due to unknown source of pain. +decreased urine output since yesterday +decreased appetite. Pt's PCP Dr. Lipscomb's assistant visited pt at home today and evaluated him. PCP: [facility]. Hx unobtainable due to pt's dementia

## 2019-04-11 NOTE — ED ADULT TRIAGE NOTE - CHIEF COMPLAINT QUOTE
AMS, lethargy, recent admission for pneumonia. RLQ abd pain? code sepsis initiated at 1738 secondary to vitals

## 2019-04-11 NOTE — H&P ADULT - NSICDXPASTSURGICALHX_GEN_ALL_CORE_FT
PAST SURGICAL HISTORY:  Artificial pacemaker     H/O percutaneous transluminal coronary angioplasty w/ stent placement

## 2019-04-11 NOTE — ED ADULT NURSE NOTE - NSSUSCREENINGQ4_ED_ALL_ED
Talked to the patient regarding her symptoms of urticaria.  She stated that her urticaria improved. No hives for more than 6 weeks. And She is not taking medications. She wants to hold for Xolair injection as her symptoms are improved.   -to follow up when urticaria recur and not controlled with high dose of antihistamines and singulair daily.        No

## 2019-04-11 NOTE — H&P ADULT - CONSTITUTIONAL DETAILS
patient is opening eyes with command, but does not communicate. No screaming during my evaluation./well-developed/no distress

## 2019-04-12 LAB
CULTURE RESULTS: NO GROWTH — SIGNIFICANT CHANGE UP
GLUCOSE BLDC GLUCOMTR-MCNC: 157 MG/DL — HIGH (ref 70–99)
GLUCOSE BLDC GLUCOMTR-MCNC: 159 MG/DL — HIGH (ref 70–99)
GLUCOSE BLDC GLUCOMTR-MCNC: 160 MG/DL — HIGH (ref 70–99)
GLUCOSE BLDC GLUCOMTR-MCNC: 170 MG/DL — HIGH (ref 70–99)
HBA1C BLD-MCNC: 7.4 % — HIGH (ref 4–5.6)
SPECIMEN SOURCE: SIGNIFICANT CHANGE UP

## 2019-04-12 RX ORDER — METOPROLOL TARTRATE 50 MG
5 TABLET ORAL EVERY 6 HOURS
Qty: 0 | Refills: 0 | Status: DISCONTINUED | OUTPATIENT
Start: 2019-04-12 | End: 2019-04-15

## 2019-04-12 RX ORDER — ACETAMINOPHEN 500 MG
650 TABLET ORAL EVERY 6 HOURS
Qty: 0 | Refills: 0 | Status: DISCONTINUED | OUTPATIENT
Start: 2019-04-12 | End: 2019-04-15

## 2019-04-12 RX ORDER — SODIUM CHLORIDE 9 MG/ML
1000 INJECTION INTRAMUSCULAR; INTRAVENOUS; SUBCUTANEOUS
Qty: 0 | Refills: 0 | Status: DISCONTINUED | OUTPATIENT
Start: 2019-04-12 | End: 2019-04-13

## 2019-04-12 RX ORDER — HYDRALAZINE HCL 50 MG
10 TABLET ORAL EVERY 6 HOURS
Qty: 0 | Refills: 0 | Status: DISCONTINUED | OUTPATIENT
Start: 2019-04-12 | End: 2019-04-15

## 2019-04-12 RX ORDER — MORPHINE SULFATE 50 MG/1
2 CAPSULE, EXTENDED RELEASE ORAL EVERY 6 HOURS
Qty: 0 | Refills: 0 | Status: DISCONTINUED | OUTPATIENT
Start: 2019-04-12 | End: 2019-04-15

## 2019-04-12 RX ADMIN — HEPARIN SODIUM 5000 UNIT(S): 5000 INJECTION INTRAVENOUS; SUBCUTANEOUS at 05:56

## 2019-04-12 RX ADMIN — SODIUM CHLORIDE 75 MILLILITER(S): 9 INJECTION INTRAMUSCULAR; INTRAVENOUS; SUBCUTANEOUS at 01:16

## 2019-04-12 RX ADMIN — HEPARIN SODIUM 5000 UNIT(S): 5000 INJECTION INTRAVENOUS; SUBCUTANEOUS at 21:53

## 2019-04-12 RX ADMIN — AMLODIPINE BESYLATE 10 MILLIGRAM(S): 2.5 TABLET ORAL at 05:55

## 2019-04-12 RX ADMIN — Medication 100 MILLIGRAM(S): at 05:55

## 2019-04-12 RX ADMIN — Medication 2: at 08:07

## 2019-04-12 RX ADMIN — Medication 2: at 11:40

## 2019-04-12 RX ADMIN — SODIUM CHLORIDE 60 MILLILITER(S): 9 INJECTION INTRAMUSCULAR; INTRAVENOUS; SUBCUTANEOUS at 17:26

## 2019-04-12 RX ADMIN — Medication 2: at 17:31

## 2019-04-12 RX ADMIN — HEPARIN SODIUM 5000 UNIT(S): 5000 INJECTION INTRAVENOUS; SUBCUTANEOUS at 13:31

## 2019-04-12 RX ADMIN — Medication 650 MILLIGRAM(S): at 22:09

## 2019-04-12 RX ADMIN — Medication 1000 UNIT(S): at 05:55

## 2019-04-12 RX ADMIN — CARBIDOPA AND LEVODOPA 1 TABLET(S): 25; 100 TABLET ORAL at 05:55

## 2019-04-12 RX ADMIN — Medication 650 MILLIGRAM(S): at 17:26

## 2019-04-12 NOTE — SWALLOW BEDSIDE ASSESSMENT ADULT - ADDITIONAL RECOMMENDATIONS
1) NUTRITION FOLLOW UP AS PT WITH A HISTORY OF CAD/HTN/CKD/HLD/DM. HE IS CURRENTLY NPO. PT IS AT NUTRITION RISK. MAINTAINING NON ORALLY IS AT DISCRETION OF ATTENDINGS IN CONCERT WITH FAMILY WISHES. I DO NOT FEEL THAT PLACEMENT OF A FEEDING TUBE WOULD NECESSARILY ENHANCE LIFE QUALITY GIVEN HIS ADVANCED AGE/DEMENTIA/PARKINSON'S.     2) CONSIDER THE POTENTIAL BENEFIT OF A PALLIATIVE CARE CONSULT.

## 2019-04-12 NOTE — SWALLOW BEDSIDE ASSESSMENT ADULT - PHARYNGEAL PHASE
Unable to assess as no PO entered pharynx. Pharyngeal function is clinically moot at this time regardless.

## 2019-04-12 NOTE — SWALLOW BEDSIDE ASSESSMENT ADULT - SWALLOW EVAL: SECRETION MANAGEMENT
No drooling noted. Testing otherwise limited due his lethargy/reduced participation/underlying confusion.

## 2019-04-12 NOTE — DIETITIAN INITIAL EVALUATION ADULT. - PERTINENT LABORATORY DATA
04-11 Na136 mmol/L Glu 349 mg/dL<H> K+ 4.3 mmol/L Cr  1.83 mg/dL<H> BUN 35 mg/dL<H> Phos n/a   Alb 2.6 g/dL<L> PAB n/a

## 2019-04-12 NOTE — PATIENT PROFILE ADULT - HOW PATIENT ADDRESSED, PROFILE
Mother informed that iron was sent to the pharmacy for patient to take in pill form  He should take pill with orange juice to help with absorption  Repeat labs were ordered and mother should take patient at  the end of March or the beginning of April  If labs improved iron will be discontinued  Mother instructed to call back with any concerns  Zaheer

## 2019-04-12 NOTE — SWALLOW BEDSIDE ASSESSMENT ADULT - COMMENTS
This patient was admitted to  with progressive confusion, lethargy and reduced PO intake. Hospital course is notable for altered alertness/mentation with encephalopathy and dehydration.  This profile is superimposed upon a history of recent ED visit with generalized weakness/altered mentation/lethargy, advanced Dementia, Parkinson's Disease, CAD s/p stent placement, pacemaker placement, HTN, CKD, HLD, DM, gout, macular degeneration, frequent falls and past shingles. The patient was admitted to  with progressive confusion, lethargy and reduced PO intake. Hospital course is notable for altered alertness/mentation with encephalopathy and dehydration.  This profile is superimposed upon a history of recent ED visit with generalized weakness/altered mentation/lethargy, advanced Dementia, Parkinson's Disease, CAD s/p stent placement, pacemaker placement, HTN, CKD, HLD, DM, gout, macular degeneration, frequent falls and past shingles.

## 2019-04-12 NOTE — DIETITIAN INITIAL EVALUATION ADULT. - PERTINENT MEDS FT
MEDICATIONS  (STANDING):  amLODIPine   Tablet 10 milliGRAM(s) Oral daily  aspirin enteric coated 81 milliGRAM(s) Oral daily  atorvastatin 10 milliGRAM(s) Oral at bedtime  carbidopa/levodopa  25/100 1 Tablet(s) Oral three times a day  cholecalciferol 1000 Unit(s) Oral two times a day  clopidogrel Tablet 75 milliGRAM(s) Oral daily  dextrose 5%. 1000 milliLiter(s) (50 mL/Hr) IV Continuous <Continuous>  dextrose 50% Injectable 12.5 Gram(s) IV Push once  dextrose 50% Injectable 25 Gram(s) IV Push once  dextrose 50% Injectable 25 Gram(s) IV Push once  heparin  Injectable 5000 Unit(s) SubCutaneous every 8 hours  insulin lispro (HumaLOG) corrective regimen sliding scale   SubCutaneous three times a day before meals  metoprolol succinate  milliGRAM(s) Oral daily  QUEtiapine 25 milliGRAM(s) Oral two times a day  risperiDONE   Tablet 0.5 milliGRAM(s) Oral at bedtime  sodium chloride 0.9%. 1000 milliLiter(s) (75 mL/Hr) IV Continuous <Continuous>    MEDICATIONS  (PRN):  acetaminophen   Tablet .. 650 milliGRAM(s) Oral every 6 hours PRN Temp greater or equal to 38C (100.4F), Mild Pain (1 - 3)  dextrose 40% Gel 15 Gram(s) Oral once PRN Blood Glucose LESS THAN 70 milliGRAM(s)/deciliter  glucagon  Injectable 1 milliGRAM(s) IntraMuscular once PRN Glucose LESS THAN 70 milligrams/deciliter  LORazepam     Tablet 0.5 milliGRAM(s) Oral three times a day PRN Anxiety

## 2019-04-12 NOTE — SWALLOW BEDSIDE ASSESSMENT ADULT - ORAL PREPARATORY PHASE
Pt's alertness for/orientation to feeding were inadequate. No anticipatory feeding behaviors were noted.

## 2019-04-12 NOTE — DIETITIAN INITIAL EVALUATION ADULT. - NUTRITIONGOAL OUTCOME1
Provide meals as tolerated by pt when feasible due to comfort measures only. If able to tolerate PO safely,provide reasonable attempts w/ food/hydration to meet comfort measures

## 2019-04-12 NOTE — SWALLOW BEDSIDE ASSESSMENT ADULT - SLP GENERAL OBSERVATIONS
On encounter, a cervical kyphosis was noted.   The patient is prominently lethargic. Eye opening is very brief/fleeting. He did not sustain joint attention. Unable to direct to structured communication tasks or follow commands. Unable to elicit any verbalizations. His reduced communicative alertness is atop suspected Cognitive Dysfunction associated with Dementia/Parkinson's that is likely heightened by metabolic encephalopathy features associated with acute illness. This precludes feasibility of assessing speech-language integrity/hinders communicative competence. Pt's needs must be anticipated.

## 2019-04-12 NOTE — SWALLOW BEDSIDE ASSESSMENT ADULT - NS SPL SWALLOW CLINIC TRIAL FT
Solids and liquids thinner than honey consistency was not offered given dysphagic profile. The pt was not stimulable for use of therapy maneuvers when probed.

## 2019-04-12 NOTE — DIETITIAN INITIAL EVALUATION ADULT. - PHYSICAL APPEARANCE
overweight/BMI 27.7, NFPE performed and reveals mild muscle wasting and mild fat wasting in orbitals

## 2019-04-12 NOTE — PROGRESS NOTE ADULT - SUBJECTIVE AND OBJECTIVE BOX
HOSPITALIST ATTENDING PROGRESS NOTE    Chart and meds reviewed.  Patient seen and examined.    HPI: 89 yo male with PMHx of dementia, gout, CKD, DM, Parkinson's disease, macular degeneration, HLD, CHF, HTN, s/p pacemaker, s/p percutaneous transluminal coronary angioplasty with stent presents to the ED with complain of lethargy, and AMS x1 month. Patient was seen at [facility] on 4/3/19 c/o bilateral LE weakness and decreased PO xweeks, discharged with unremarkable workup. Patient's wife reports that patient has been screaming, due to unknown source of pain. he also has +decreased urine output since yesterday and +decreased appetite.     19 pt seen and examined, wife at bedside/family friend. Wife is next of kin and HCP. Patient did not respond to painful stimuli. moaned with painful stimuli. Wife addressed MOLST form. Patient has not been eating for the past 1 month. The aides at home try to feed and wife concerned he could aspirate. MOLST filled out and wishes of DNR/DNI and inpatient hospice per wife.     All 10 systems reviewed and found to be negative with the exception of what has been described above.    MEDICATIONS  (STANDING):  amLODIPine   Tablet 10 milliGRAM(s) Oral daily  aspirin enteric coated 81 milliGRAM(s) Oral daily  atorvastatin 10 milliGRAM(s) Oral at bedtime  carbidopa/levodopa  25/100 1 Tablet(s) Oral three times a day  cholecalciferol 1000 Unit(s) Oral two times a day  clopidogrel Tablet 75 milliGRAM(s) Oral daily  dextrose 5%. 1000 milliLiter(s) (50 mL/Hr) IV Continuous <Continuous>  dextrose 50% Injectable 12.5 Gram(s) IV Push once  dextrose 50% Injectable 25 Gram(s) IV Push once  dextrose 50% Injectable 25 Gram(s) IV Push once  heparin  Injectable 5000 Unit(s) SubCutaneous every 8 hours  insulin lispro (HumaLOG) corrective regimen sliding scale   SubCutaneous three times a day before meals  metoprolol succinate  milliGRAM(s) Oral daily  QUEtiapine 25 milliGRAM(s) Oral two times a day  risperiDONE   Tablet 0.5 milliGRAM(s) Oral at bedtime  sodium chloride 0.9%. 1000 milliLiter(s) (75 mL/Hr) IV Continuous <Continuous>  sodium chloride 0.9%. 1000 milliLiter(s) (60 mL/Hr) IV Continuous <Continuous>    MEDICATIONS  (PRN):  acetaminophen   Tablet .. 650 milliGRAM(s) Oral every 6 hours PRN Temp greater or equal to 38C (100.4F), Mild Pain (1 - 3)  dextrose 40% Gel 15 Gram(s) Oral once PRN Blood Glucose LESS THAN 70 milliGRAM(s)/deciliter  glucagon  Injectable 1 milliGRAM(s) IntraMuscular once PRN Glucose LESS THAN 70 milligrams/deciliter  LORazepam     Tablet 0.5 milliGRAM(s) Oral three times a day PRN Anxiety      VITALS:  T(F): 101.4 (19 @ 16:37), Max: 101.4 (19 @ 16:37)  HR: 94 (19 @ 16:37) (66 - 94)  BP: 132/70 (19 @ 16:37) (90/47 - 175/77)  RR: 18 (19 @ 16:37) (15 - 20)  SpO2: 94% (19 @ 16:37) (88% - 98%)  Wt(kg): --    I&O's Summary      CAPILLARY BLOOD GLUCOSE      POCT Blood Glucose.: 159 mg/dL (2019 11:38)  POCT Blood Glucose.: 170 mg/dL (2019 08:05)      PHYSICAL EXAM:    HEENT:  pupils equal and reactive, EOMI, no oropharyngeal lesions, erythema, exudates, oral thrush  NECK:   supple, no carotid bruits, no palpable lymph nodes, no thyromegaly  CV:  +S1, +S2, regular, no murmurs or rubs  RESP:   lungs clear to auscultation bilaterally, no wheezing, rales, rhonchi, good air entry bilaterally  BREAST:  not examined  GI:  abdomen soft, non-tender, non-distended, normal BS, no bruits, no abdominal masses, no palpable masses  RECTAL:  not examined  :  not examined  MSK:   normal muscle tone, no atrophy, no rigidity, no contractions  EXT:  no clubbing, no cyanosis, no edema, no calf pain, swelling or erythema  VASCULAR:  pulses equal and symmetric in the upper and lower extremities  NEURO:  AAOX3, no focal neurological deficits, follows all commands, able to move extremities spontaneously  SKIN:  no ulcers, lesions or rashes    LABS:                            15.1   11.32 )-----------( 189      ( 2019 17:49 )             45.8         136  |  109<H>  |  35<H>  ----------------------------<  349<H>  4.3   |  19<L>  |  1.83<H>    Ca    8.4<L>      2019 19:18    TPro  6.0  /  Alb  2.6<L>  /  TBili  0.4  /  DBili  x   /  AST  36  /  ALT  17  /  AlkPhos  101      CARDIAC MARKERS ( 2019 22:18 )  0.016 ng/mL / x     / x     / x     / x      CARDIAC MARKERS ( 2019 19:18 )  0.016 ng/mL / x     / x     / x     / x          LIVER FUNCTIONS - ( 2019 19:18 )  Alb: 2.6 g/dL / Pro: 6.0 gm/dL / ALK PHOS: 101 U/L / ALT: 17 U/L / AST: 36 U/L / GGT: x           PT/INR - ( 2019 17:49 )   PT: 11.8 sec;   INR: 1.06 ratio         PTT - ( 2019 17:49 )  PTT:35.2 sec  Urinalysis Basic - ( 2019 17:49 )    Color: Yellow / Appearance: Clear / S.015 / pH: x  Gluc: x / Ketone: Negative  / Bili: Negative / Urobili: Negative mg/dL   Blood: x / Protein: 100 mg/dL / Nitrite: Negative   Leuk Esterase: Negative / RBC: 0-2 /HPF / WBC 0-2   Sq Epi: x / Non Sq Epi: Few / Bacteria: Few        Lactate, Blood: 2.0 mmol/L ( @ 17:49)    Blood, Urine: Trace ( @ 17:49)                            CULTURES:  Blood, Urine: Trace ( @ 17:49)

## 2019-04-12 NOTE — SWALLOW BEDSIDE ASSESSMENT ADULT - ORAL PHASE
Pt either passively accepted PO or resistively closed mouth upon PO presentation. When PO did enter his mouth, food stagnated in his oral cavity without attempts to form or transfer a bolus. His oral awareness of oral food residue was poor and food residue needed to be manually removed from mouth prior to entering pharynx.

## 2019-04-12 NOTE — SWALLOW BEDSIDE ASSESSMENT ADULT - ASR SWALLOW LABIAL MOBILITY
Unable to assess due to pt's lethargy, altered mentation, reduced active participation and resistive mouth closing on stimulation.

## 2019-04-12 NOTE — PROGRESS NOTE ADULT - ASSESSMENT
89 yo  male brought in due to altered mental status.    # Altered mental status with hx of advanced parkinson disease and dementia  # Failure to thrive due to poor po intake and advanced parkinsons disease  # CKD stage II  # DM   # DVT ppx    Plan:   - Wife does not want PEG/NGT, antibiotics. She does want a trial of IVF  - NPO given risk of aspiration, speech and swallow evaluation   - inpatient hospice request done  - IV meds PRN for HTN, tachycardia  - IV ativan for anxiety  - IV morphine for pain    MOLST form filled out.   Awaiting

## 2019-04-12 NOTE — DIETITIAN INITIAL EVALUATION ADULT. - OTHER INFO
Pt consulted for nutrition assessment 2/2 PU stage 2 and education. Pt admitted 4/11 for AMS and encephalopathy. Education not appropriate 2/2 altered mental status, h/o dementia and unresponsive. PMH of dementia, CKD, DM, Parkinson's disease, macular degeneration, HLD, CHF, HTN, gout. Upon visit, pt appears well-nourished, overwt, BMI 27.7. Per H&P, pt's wife reported pt with decreased appetite PTA; pt with h/o poor PO intake during past  admissions. NFPE performed and reveals mild muscle wasting and mild fat wasting in orbitals. Current diet order is Consistent Carbohydrate with lavelle snack however pt has not been consuming meals today due to swallow eval this afternoon. Per SLP, NPO recommended due to pt not a candidate for PO in current lethargic altered state, no h/o of altered textured diet. Pt is at high nutritional risk 2/2 inadequate oral intake. TF not appropriate 2/2 poor prognosis. MOLST indicates DNR, DNI, comfort measures only, no wishes for TF. Thomas score 12, stage 2 PU rt buttocks, no edema noted, last BM not noted. Inadequate oral intake r/t altered mental status 2/2 h/o dementia AEB inability to consume PO diet currently. Recommendations: 1) Suggest Palliative eval for hospice. 2) Provide PO when pt alert and medically feasible. Change diet to regular if PO poor to optimize intake. 3) Encourage/assist with meals when feasible. Pt consulted for nutrition assessment 2/2 PU stage 2 and education. Pt admitted 4/11 for AMS and encephalopathy. Education not appropriate 2/2 altered mental status, h/o dementia and unresponsive. PMH of dementia, CKD, DM, Parkinson's disease, macular degeneration, HLD, CHF, HTN, gout. Upon visit, pt appears overwt, BMI 27.7. Per H&P, pt's wife reported pt with decreased appetite PTA; pt with h/o poor PO intake during past  admissions. NFPE performed and reveals mild muscle wasting and mild fat wasting in orbitals. Current diet order is Consistent Carbohydrate with lavelle snack however pt has not been consuming meals today due to swallow eval this afternoon. Per SLP, NPO recommended due to pt not a candidate for PO in current lethargic altered state, no h/o of altered textured diet. Pt is at high nutritional risk 2/2 inadequate oral intake. TF not appropriate 2/2 poor prognosis. MOLST indicates DNR, DNI, comfort measures only, no wishes for TF. Thomas score 12, stage 2 PU rt buttocks, no edema noted, last BM not noted. Inadequate oral intake r/t altered mental status 2/2 h/o dementia AEB inability to consume PO diet currently. Recommendations: 1) Suggest Palliative eval for hospice. 2) Suggest re-eval by SLP when pt alert. 3) If pt able to tolerate PO, liberalize diet to regular with appropriate consistency/thickened liquids prn.

## 2019-04-13 ENCOUNTER — TRANSCRIPTION ENCOUNTER (OUTPATIENT)
Age: 84
End: 2019-04-13

## 2019-04-13 LAB
GLUCOSE BLDC GLUCOMTR-MCNC: 148 MG/DL — HIGH (ref 70–99)
GLUCOSE BLDC GLUCOMTR-MCNC: 159 MG/DL — HIGH (ref 70–99)

## 2019-04-13 RX ORDER — CHOLECALCIFEROL (VITAMIN D3) 125 MCG
1 CAPSULE ORAL
Qty: 0 | Refills: 0 | COMMUNITY

## 2019-04-13 RX ORDER — ACETAMINOPHEN 500 MG
1 TABLET ORAL
Qty: 0 | Refills: 0 | COMMUNITY
Start: 2019-04-13

## 2019-04-13 RX ORDER — ASPIRIN/CALCIUM CARB/MAGNESIUM 324 MG
1 TABLET ORAL
Qty: 0 | Refills: 0 | COMMUNITY

## 2019-04-13 RX ORDER — QUETIAPINE FUMARATE 200 MG/1
1 TABLET, FILM COATED ORAL
Qty: 0 | Refills: 0 | COMMUNITY

## 2019-04-13 RX ORDER — CLOPIDOGREL BISULFATE 75 MG/1
1 TABLET, FILM COATED ORAL
Qty: 0 | Refills: 0 | COMMUNITY

## 2019-04-13 RX ORDER — LACTOBACILLUS ACIDOPHILUS 100MM CELL
1 CAPSULE ORAL
Qty: 0 | Refills: 0 | COMMUNITY

## 2019-04-13 RX ORDER — SODIUM CHLORIDE 9 MG/ML
1000 INJECTION INTRAMUSCULAR; INTRAVENOUS; SUBCUTANEOUS
Qty: 0 | Refills: 0 | Status: DISCONTINUED | OUTPATIENT
Start: 2019-04-13 | End: 2019-04-15

## 2019-04-13 RX ORDER — METOPROLOL TARTRATE 50 MG
1 TABLET ORAL
Qty: 0 | Refills: 0 | COMMUNITY

## 2019-04-13 RX ORDER — FEBUXOSTAT 40 MG/1
1 TABLET ORAL
Qty: 0 | Refills: 0 | COMMUNITY

## 2019-04-13 RX ORDER — MORPHINE SULFATE 50 MG/1
2 CAPSULE, EXTENDED RELEASE ORAL
Qty: 0 | Refills: 0 | COMMUNITY
Start: 2019-04-13

## 2019-04-13 RX ORDER — RISPERIDONE 4 MG/1
1 TABLET ORAL
Qty: 0 | Refills: 0 | COMMUNITY

## 2019-04-13 RX ORDER — ATORVASTATIN CALCIUM 80 MG/1
1 TABLET, FILM COATED ORAL
Qty: 0 | Refills: 0 | COMMUNITY

## 2019-04-13 RX ADMIN — Medication 2: at 08:12

## 2019-04-13 RX ADMIN — Medication 650 MILLIGRAM(S): at 05:26

## 2019-04-13 RX ADMIN — HEPARIN SODIUM 5000 UNIT(S): 5000 INJECTION INTRAVENOUS; SUBCUTANEOUS at 14:44

## 2019-04-13 RX ADMIN — SODIUM CHLORIDE 60 MILLILITER(S): 9 INJECTION INTRAMUSCULAR; INTRAVENOUS; SUBCUTANEOUS at 10:25

## 2019-04-13 RX ADMIN — HEPARIN SODIUM 5000 UNIT(S): 5000 INJECTION INTRAVENOUS; SUBCUTANEOUS at 22:31

## 2019-04-13 RX ADMIN — HEPARIN SODIUM 5000 UNIT(S): 5000 INJECTION INTRAVENOUS; SUBCUTANEOUS at 05:19

## 2019-04-13 RX ADMIN — SODIUM CHLORIDE 45 MILLILITER(S): 9 INJECTION INTRAMUSCULAR; INTRAVENOUS; SUBCUTANEOUS at 23:16

## 2019-04-13 RX ADMIN — Medication 10 MILLIGRAM(S): at 05:23

## 2019-04-13 NOTE — PROGRESS NOTE ADULT - ASSESSMENT
· Assessment		  89 yo  male brought in due to altered mental status.    # Altered mental status with hx of advanced parkinson disease and dementia  # Failure to thrive due to poor po intake and advanced parkinsons disease  # CKD stage II  # DM   # DVT ppx    Plan:   - Wife does not want PEG/NGT, antibiotics. She does want a trial of IVF  - NPO given risk of aspiration, speech and swallow evaluation appreciated  - inpatient hospice request done  - IV meds PRN for HTN, tachycardia  - IV ativan for anxiety  - IV morphine for pain    MOLST form filled out.   Awaiting inpatient hospice.

## 2019-04-13 NOTE — PROGRESS NOTE ADULT - SUBJECTIVE AND OBJECTIVE BOX
HOSPITALIST ATTENDING PROGRESS NOTE    Chart and meds reviewed.  Patient seen and examined.    HPI: 89 yo male with PMHx of dementia, gout, CKD, DM, Parkinson's disease, macular degeneration, HLD, CHF, HTN, s/p pacemaker, s/p percutaneous transluminal coronary angioplasty with stent presents to the ED with complain of lethargy, and AMS x1 month. Patient was seen at [facility] on 4/3/19 c/o bilateral LE weakness and decreased PO xweeks, discharged with unremarkable workup. Patient's wife reports that patient has been screaming, due to unknown source of pain. he also has +decreased urine output since yesterday and +decreased appetite.     19 pt moaned with responded "yes" once. more awake than yesterday. still NPO due to high risk of aspiration.     All 10 systems reviewed and found to be negative with the exception of what has been described above.    MEDICATIONS  (STANDING):  amLODIPine   Tablet 10 milliGRAM(s) Oral daily  aspirin enteric coated 81 milliGRAM(s) Oral daily  atorvastatin 10 milliGRAM(s) Oral at bedtime  carbidopa/levodopa  25/100 1 Tablet(s) Oral three times a day  cholecalciferol 1000 Unit(s) Oral two times a day  clopidogrel Tablet 75 milliGRAM(s) Oral daily  dextrose 5%. 1000 milliLiter(s) (50 mL/Hr) IV Continuous <Continuous>  dextrose 50% Injectable 12.5 Gram(s) IV Push once  dextrose 50% Injectable 25 Gram(s) IV Push once  dextrose 50% Injectable 25 Gram(s) IV Push once  heparin  Injectable 5000 Unit(s) SubCutaneous every 8 hours  insulin lispro (HumaLOG) corrective regimen sliding scale   SubCutaneous three times a day before meals  metoprolol succinate  milliGRAM(s) Oral daily  QUEtiapine 25 milliGRAM(s) Oral two times a day  risperiDONE   Tablet 0.5 milliGRAM(s) Oral at bedtime  sodium chloride 0.9%. 1000 milliLiter(s) (60 mL/Hr) IV Continuous <Continuous>    MEDICATIONS  (PRN):  acetaminophen   Tablet .. 650 milliGRAM(s) Oral every 6 hours PRN Temp greater or equal to 38C (100.4F), Mild Pain (1 - 3)  acetaminophen  Suppository .. 650 milliGRAM(s) Rectal every 6 hours PRN Temp greater or equal to 38.5C (101.3F), Mild Pain (1 - 3)  acetaminophen  Suppository .. 650 milliGRAM(s) Rectal every 6 hours PRN Temp greater or equal to 38C (100.4F), Mild Pain (1 - 3)  dextrose 40% Gel 15 Gram(s) Oral once PRN Blood Glucose LESS THAN 70 milliGRAM(s)/deciliter  glucagon  Injectable 1 milliGRAM(s) IntraMuscular once PRN Glucose LESS THAN 70 milligrams/deciliter  hydrALAZINE Injectable 10 milliGRAM(s) IV Push every 6 hours PRN elevated BP  LORazepam     Tablet 0.5 milliGRAM(s) Oral three times a day PRN Anxiety  LORazepam   Injectable 0.5 milliGRAM(s) IV Push every 6 hours PRN Anxiety  metoprolol tartrate Injectable 5 milliGRAM(s) IV Push every 6 hours PRN tachycardia  morphine  - Injectable 2 milliGRAM(s) IV Push every 6 hours PRN Moderate Pain (4 - 6)      VITALS:  T(F): 98.9 (19 @ 11:40), Max: 101.4 (19 @ 16:37)  HR: 86 (19 @ 11:40) (81 - 94)  BP: 134/33 (19 @ 11:40) (132/70 - 172/63)  RR: 16 (19 @ 11:40) (16 - 18)  SpO2: 98% (19 @ 11:40) (94% - 98%)  Wt(kg): --    I&O's Summary      CAPILLARY BLOOD GLUCOSE      POCT Blood Glucose.: 148 mg/dL (2019 12:49)  POCT Blood Glucose.: 159 mg/dL (2019 08:08)  POCT Blood Glucose.: 160 mg/dL (2019 21:31)  POCT Blood Glucose.: 157 mg/dL (2019 17:30)      PHYSICAL EXAM:    HEENT:  pupils equal and reactive, EOMI, no oropharyngeal lesions, erythema, exudates, oral thrush  NECK:   supple, no carotid bruits, no palpable lymph nodes, no thyromegaly  CV:  +S1, +S2, regular, no murmurs or rubs  RESP:   lungs clear to auscultation bilaterally, no wheezing, rales, rhonchi, good air entry bilaterally  BREAST:  not examined  GI:  abdomen soft, non-tender, non-distended, normal BS, no bruits, no abdominal masses, no palpable masses  RECTAL:  not examined  :  not examined  MSK:   normal muscle tone, no atrophy, no rigidity, no contractions  EXT:  no clubbing, no cyanosis, no edema, no calf pain, swelling or erythema  VASCULAR:  pulses equal and symmetric in the upper and lower extremities  NEURO:  AAOX3, no focal neurological deficits, follows all commands, able to move extremities spontaneously  SKIN:  no ulcers, lesions or rashes    LABS:                            15.1   11.32 )-----------( 189      ( 2019 17:49 )             45.8     04-11    136  |  109<H>  |  35<H>  ----------------------------<  349<H>  4.3   |  19<L>  |  1.83<H>    Ca    8.4<L>      2019 19:18    TPro  6.0  /  Alb  2.6<L>  /  TBili  0.4  /  DBili  x   /  AST  36  /  ALT  17  /  AlkPhos  101  04-11    CARDIAC MARKERS ( 2019 22:18 )  0.016 ng/mL / x     / x     / x     / x      CARDIAC MARKERS ( 2019 19:18 )  0.016 ng/mL / x     / x     / x     / x          LIVER FUNCTIONS - ( 2019 19:18 )  Alb: 2.6 g/dL / Pro: 6.0 gm/dL / ALK PHOS: 101 U/L / ALT: 17 U/L / AST: 36 U/L / GGT: x           PT/INR - ( 2019 17:49 )   PT: 11.8 sec;   INR: 1.06 ratio         PTT - ( 2019 17:49 )  PTT:35.2 sec  Urinalysis Basic - ( 2019 17:49 )    Color: Yellow / Appearance: Clear / S.015 / pH: x  Gluc: x / Ketone: Negative  / Bili: Negative / Urobili: Negative mg/dL   Blood: x / Protein: 100 mg/dL / Nitrite: Negative   Leuk Esterase: Negative / RBC: 0-2 /HPF / WBC 0-2   Sq Epi: x / Non Sq Epi: Few / Bacteria: Few                                    CULTURES:

## 2019-04-13 NOTE — DISCHARGE NOTE PROVIDER - HOSPITAL COURSE
89 yo male with PMHx of dementia, gout, CKD, DM, Parkinson's disease, macular degeneration, HLD, CHF, HTN, s/p pacemaker, s/p percutaneous transluminal coronary angioplasty with stent presents to the ED with complain of lethargy, and AMS x1 month. Patient was seen at [facility] on 4/3/19 c/o bilateral LE weakness and decreased PO xweeks, discharged with unremarkable workup. Patient's wife reports that patient has been screaming, due to unknown source of pain. he also has +decreased urine output since yesterday and +decreased appetite.         4/13/19 pt moaned with responded "yes" once. more awake than yesterday. still NPO due to high risk of aspiration.         Hospital course: Patient has not been eating for the past 1 month, minimially active, bedbound, aides wash the patient in bed, combative at time per wife, not ambulatory. Currently minimally responsive verbally. He moans to painful stimuli.The wife wanted inpatient hospice for which we have sent in request. Speech and swallow evaluation was done which showed that patient was not able to take anything PO. IV pain/anxiety medsa nd as needeed for HTN.             PHYSICAL EXAM:        Constitutional: NAD, awake and alert, well-developed    HEENT: PERR, EOMI, Normal Hearing, MMM    Neck: Soft and supple, No LAD, No JVD    Respiratory: Breath sounds are clear bilaterally, No wheezing, rales or rhonchi    Cardiovascular: S1 and S2, regular rate and rhythm, no Murmurs, gallops or rubs    Gastrointestinal: Bowel Sounds present, soft, nontender, nondistended, no guarding, no rebound    Extremities: No peripheral edema    Vascular: 2+ peripheral pulses    Neurological: A/O x 3, no focal deficits    Musculoskeletal: 5/5 strength b/l upper and lower extremities    Skin: No rashes        · Assessment        89 yo  male brought in due to altered mental status.        # Altered mental status with hx of advanced parkinson disease and dementia    # Failure to thrive due to poor po intake and advanced parkinsons disease    # CKD stage II    # DM     # DVT ppx        Plan:     - Wife does not want PEG/NGT, antibiotics. She does want a trial of IVF    - NPO given risk of aspiration, speech and swallow evaluation appreciated    - inpatient hospice request done    - IV meds PRN for HTN, tachycardia    - IV ativan for anxiety    - IV morphine for pain        MOLST form filled out.     Awaiting inpatient hospice.        total time for discharge 50 minutes

## 2019-04-14 ENCOUNTER — TRANSCRIPTION ENCOUNTER (OUTPATIENT)
Age: 84
End: 2019-04-14

## 2019-04-14 RX ORDER — ROBINUL 0.2 MG/ML
0.2 INJECTION INTRAMUSCULAR; INTRAVENOUS EVERY 6 HOURS
Qty: 0 | Refills: 0 | Status: DISCONTINUED | OUTPATIENT
Start: 2019-04-14 | End: 2019-04-15

## 2019-04-14 RX ADMIN — HEPARIN SODIUM 5000 UNIT(S): 5000 INJECTION INTRAVENOUS; SUBCUTANEOUS at 05:12

## 2019-04-14 RX ADMIN — ROBINUL 0.2 MILLIGRAM(S): 0.2 INJECTION INTRAMUSCULAR; INTRAVENOUS at 21:32

## 2019-04-14 RX ADMIN — HEPARIN SODIUM 5000 UNIT(S): 5000 INJECTION INTRAVENOUS; SUBCUTANEOUS at 21:32

## 2019-04-14 NOTE — PROGRESS NOTE ADULT - SUBJECTIVE AND OBJECTIVE BOX
HOSPITALIST ATTENDING PROGRESS NOTE    Chart and meds reviewed.  Patient seen and examined.    HPI:  89 yo male with PMHx of dementia, gout, CKD, DM, Parkinson's disease, macular degeneration, HLD, CHF, HTN, s/p pacemaker, s/p percutaneous transluminal coronary angioplasty with stent presents to the ED with complain of lethargy, and AMS x1 month. Patient was seen at [facility] on 4/3/19 c/o bilateral LE weakness and decreased PO xweeks, discharged with unremarkable workup. Patient's wife reports that patient has been screaming, due to unknown source of pain. he also has +decreased urine output since yesterday and +decreased appetite.     4/13/19 pt moaned with responded "yes" once. more awake than yesterday. still NPO due to high risk of aspiration.     4/14/19 pt seen and examined, non verbal, lungs with congestion, awaiting bed.     All 10 systems reviewed and found to be negative with the exception of what has been described above.    MEDICATIONS  (STANDING):  amLODIPine   Tablet 10 milliGRAM(s) Oral daily  aspirin enteric coated 81 milliGRAM(s) Oral daily  atorvastatin 10 milliGRAM(s) Oral at bedtime  carbidopa/levodopa  25/100 1 Tablet(s) Oral three times a day  cholecalciferol 1000 Unit(s) Oral two times a day  clopidogrel Tablet 75 milliGRAM(s) Oral daily  heparin  Injectable 5000 Unit(s) SubCutaneous every 8 hours  metoprolol succinate  milliGRAM(s) Oral daily  QUEtiapine 25 milliGRAM(s) Oral two times a day  risperiDONE   Tablet 0.5 milliGRAM(s) Oral at bedtime  sodium chloride 0.9%. 1000 milliLiter(s) (45 mL/Hr) IV Continuous <Continuous>    MEDICATIONS  (PRN):  acetaminophen   Tablet .. 650 milliGRAM(s) Oral every 6 hours PRN Temp greater or equal to 38C (100.4F), Mild Pain (1 - 3)  acetaminophen  Suppository .. 650 milliGRAM(s) Rectal every 6 hours PRN Temp greater or equal to 38.5C (101.3F), Mild Pain (1 - 3)  acetaminophen  Suppository .. 650 milliGRAM(s) Rectal every 6 hours PRN Temp greater or equal to 38C (100.4F), Mild Pain (1 - 3)  hydrALAZINE Injectable 10 milliGRAM(s) IV Push every 6 hours PRN elevated BP  LORazepam     Tablet 0.5 milliGRAM(s) Oral three times a day PRN Anxiety  LORazepam   Injectable 0.5 milliGRAM(s) IV Push every 6 hours PRN Anxiety  metoprolol tartrate Injectable 5 milliGRAM(s) IV Push every 6 hours PRN tachycardia  morphine  - Injectable 2 milliGRAM(s) IV Push every 6 hours PRN Moderate Pain (4 - 6)      VITALS:  T(F): 98.5 (04-14-19 @ 04:19), Max: 98.5 (04-14-19 @ 04:19)  HR: 65 (04-14-19 @ 04:19) (65 - 93)  BP: 124/61 (04-14-19 @ 04:19) (124/61 - 149/69)  RR: 18 (04-14-19 @ 04:19) (18 - 18)  SpO2: 93% (04-14-19 @ 04:19) (93% - 94%)  Wt(kg): --    I&O's Summary      CAPILLARY BLOOD GLUCOSE      POCT Blood Glucose.: 148 mg/dL (13 Apr 2019 12:49)      PHYSICAL EXAM:    HEENT:  pupils equal and reactive, EOMI, no oropharyngeal lesions, erythema, exudates, oral thrush  NECK:   supple, no carotid bruits, no palpable lymph nodes, no thyromegaly  CV:  +S1, +S2, regular, no murmurs or rubs  RESP:   lungs clear to auscultation bilaterally, no wheezing, rales, rhonchi, good air entry bilaterally  BREAST:  not examined  GI:  abdomen soft, non-tender, non-distended, normal BS, no bruits, no abdominal masses, no palpable masses  RECTAL:  not examined  :  not examined  MSK:   normal muscle tone, no atrophy, no rigidity, no contractions  EXT:  no clubbing, no cyanosis, no edema, no calf pain, swelling or erythema  VASCULAR:  pulses equal and symmetric in the upper and lower extremities  NEURO:  AAOX3, no focal neurological deficits, follows all commands, able to move extremities spontaneously  SKIN:  no ulcers, lesions or rashes    LABS:                                                      CULTURES:

## 2019-04-14 NOTE — DISCHARGE NOTE NURSING/CASE MANAGEMENT/SOCIAL WORK - NSDCDPATPORTLINK_GEN_ALL_CORE
You can access the [facility] Patient Portal, offered by [facility], by registering with the following website: [email or web address]

## 2019-04-14 NOTE — PROGRESS NOTE ADULT - ASSESSMENT
· Assessment		  89 yo  male brought in due to altered mental status.    # Altered mental status with hx of advanced parkinson disease and dementia  # Failure to thrive due to poor po intake and advanced parkinsons disease  # CKD stage II  # DM   # DVT ppx    Plan:   - Wife does not want PEG/NGT, antibiotics. can DC IVF given chest congestion  - NPO given risk of aspiration, speech and swallow evaluation appreciated  - inpatient hospice request done  - IV meds PRN for HTN, tachycardia  - IV ativan for anxiety  - IV morphine for pain    MOLST form filled out.   Awaiting inpatient hospice.

## 2019-04-15 VITALS
DIASTOLIC BLOOD PRESSURE: 72 MMHG | OXYGEN SATURATION: 90 % | HEART RATE: 88 BPM | SYSTOLIC BLOOD PRESSURE: 127 MMHG | TEMPERATURE: 98 F | RESPIRATION RATE: 18 BRPM

## 2019-04-15 RX ADMIN — ROBINUL 0.2 MILLIGRAM(S): 0.2 INJECTION INTRAMUSCULAR; INTRAVENOUS at 04:51

## 2019-04-15 RX ADMIN — HEPARIN SODIUM 5000 UNIT(S): 5000 INJECTION INTRAVENOUS; SUBCUTANEOUS at 04:50

## 2019-04-16 NOTE — ED ADULT NURSE NOTE - DOES PATIENT HAVE ADVANCE DIRECTIVE
Medication(s) Requested: Tramadol 50 mg  Last office visit: 6/6/18  fuv scheduled 7/31/19  Last refill: dispensed 12/17/18  #60  Is the patient due for refill of this medication(s): Yes  PDMP review: Criteria met. Prescription printed for Physician/APRIL signature.      Yes

## 2019-04-26 DIAGNOSIS — G20 PARKINSON'S DISEASE: ICD-10-CM

## 2019-04-26 DIAGNOSIS — Z51.5 ENCOUNTER FOR PALLIATIVE CARE: ICD-10-CM

## 2019-04-26 DIAGNOSIS — Z95.0 PRESENCE OF CARDIAC PACEMAKER: ICD-10-CM

## 2019-04-26 DIAGNOSIS — E86.0 DEHYDRATION: ICD-10-CM

## 2019-04-26 DIAGNOSIS — Z95.5 PRESENCE OF CORONARY ANGIOPLASTY IMPLANT AND GRAFT: ICD-10-CM

## 2019-04-26 DIAGNOSIS — Z66 DO NOT RESUSCITATE: ICD-10-CM

## 2019-04-26 DIAGNOSIS — G93.41 METABOLIC ENCEPHALOPATHY: ICD-10-CM

## 2019-04-26 DIAGNOSIS — N18.4 CHRONIC KIDNEY DISEASE, STAGE 4 (SEVERE): ICD-10-CM

## 2019-04-26 DIAGNOSIS — Z79.82 LONG TERM (CURRENT) USE OF ASPIRIN: ICD-10-CM

## 2019-04-26 DIAGNOSIS — R62.7 ADULT FAILURE TO THRIVE: ICD-10-CM

## 2019-04-26 DIAGNOSIS — Z74.01 BED CONFINEMENT STATUS: ICD-10-CM

## 2019-04-26 DIAGNOSIS — R41.82 ALTERED MENTAL STATUS, UNSPECIFIED: ICD-10-CM

## 2019-04-26 DIAGNOSIS — I50.9 HEART FAILURE, UNSPECIFIED: ICD-10-CM

## 2019-04-26 DIAGNOSIS — E11.22 TYPE 2 DIABETES MELLITUS WITH DIABETIC CHRONIC KIDNEY DISEASE: ICD-10-CM

## 2019-04-26 DIAGNOSIS — M10.9 GOUT, UNSPECIFIED: ICD-10-CM

## 2019-04-26 DIAGNOSIS — J96.00 ACUTE RESPIRATORY FAILURE, UNSPECIFIED WHETHER WITH HYPOXIA OR HYPERCAPNIA: ICD-10-CM

## 2019-04-26 DIAGNOSIS — I13.0 HYPERTENSIVE HEART AND CHRONIC KIDNEY DISEASE WITH HEART FAILURE AND STAGE 1 THROUGH STAGE 4 CHRONIC KIDNEY DISEASE, OR UNSPECIFIED CHRONIC KIDNEY DISEASE: ICD-10-CM

## 2019-04-26 DIAGNOSIS — Z79.02 LONG TERM (CURRENT) USE OF ANTITHROMBOTICS/ANTIPLATELETS: ICD-10-CM

## 2019-04-26 DIAGNOSIS — H35.30 UNSPECIFIED MACULAR DEGENERATION: ICD-10-CM

## 2019-06-26 NOTE — ED ADULT NURSE NOTE - NS ED PATIENT SAFETY CONCERN
Patient: Rosales Timmons Date: 2019   : 1962 Attending: Marcell Jarquin*   57 year old male      Chief Complaint: RLE wound    Subjective:   Patient seen and examined.  Feeling better.  Discussed US results.  Needs improved glycemic control.  Denies CP/SOB.  NAD.     Medications/Infusions: Reviewed    Review of Systems: A 12 point review of systems is negative except as in the HPI                Vital Last Value 24 Hour Range   Temperature 98.4 °F (36.9 °C) (19) Temp  Min: 98.2 °F (36.8 °C)  Max: 98.4 °F (36.9 °C)   Pulse 63 (19) Pulse  Min: 63  Max: 86   Respiratory 20 (19) Resp  Min: 20  Max: 22   Non-Invasive  Blood Pressure 134/81 (19) BP  Min: 100/60  Max: 134/81   Pulse Oximetry 97 % (19) SpO2  Min: 96 %  Max: 98 %      Intake/Output:      Intake/Output Summary (Last 24 hours) at 2019 1113  Last data filed at 2019 1112  Gross per 24 hour   Intake 1030 ml   Output 2175 ml   Net -1145 ml       Physical Exam:   General: NAD, AAO x3  HEENT: NCAT. Oral mucous membranes are moist. Neck is soft and supple.   CV: RRR, normal S1 and S2. No murmurs  Pulm: Lungs are clear to auscultation bilaterally. No wheezes, rales or rhonchi.   Abd: Soft, non-tender and non-distended. Bowel sounds are normoactive.   Ext: RLE wound infection with erythema from mid-tibia to dorsal foot, dressing with serosanguinous drainage. LLE prosthesis in place   Skin: No rashes.   Neuro: Nonfocal       Laboratory Results:   Recent Labs   Lab 19  0335 19  1750 19  0824 19  0320  19  0335 19   WBC  --   --   --  9.3  --  10.1 10.9   HCT  --   --   --  36.7*  --  37.3* 42.0   HGB  --   --   --  12.1*  --  12.8* 14.3   PLT  --   --   --  210  --  216 237   INR 2.1  --   --  2.0  --  1.6 1.6   SODIUM  --   --   --  136  --  136 133*   POTASSIUM  --  3.8 3.4 3.2*   < > 2.8* 2.7*   CHLORIDE  --   --   --  101  --  97* 91*    CO2  --   --   --  29  --  32 36*   CALCIUM  --   --   --  8.2*  --  8.4 8.8   GLUCOSE  --   --   --  201*  --  286* 325*   BUN  --   --   --  26*  --  29* 32*   CREATININE  --   --   --  1.00  --  1.06 1.32*   GFRNA  --   --   --  83  --  78 59    < > = values in this interval not displayed.       Imaging: No results found.    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 From 7:00pm to 7:00 am contact the Hospitalist on call     No

## 2019-08-03 NOTE — DIETITIAN INITIAL EVALUATION ADULT. - OTHER INFO
pt seen as LOS: 89yo male with PMH of CAD s/p PCI with stents, HTN, HLD, CKD III, parkinson's p/w intermittent chest pressure with brief periods of dyspnea.  Pt eval'd by neurology 2/2 episode of visual hallucinations and lightheadedness.  Upon visit, pt not able to provide information 2/2 agitation/hallucinations/periods of confusion.  Per EMR, pt is eating well ~% of meals.  EMR also documented wt loss of ~20# in past 3 months (9%); clinically significant if accurate.  However, unable to determine and pt appears well nourished.  pt with increased nutr needs 2/2 pressure ulcer.  SLP eval'd pt on 8/26, rec'd regular texture and thin liquids.  RECOMMENDATIONS: 1) encourage PO intake and add prosource once daily to ensure adequate protein intake for wound healing 2) add MVI with minerals and vitamin C 500mg BID to optimize wound healing 3) daily wt checks Fall with Harm Risk

## 2019-09-12 NOTE — ED ADULT NURSE NOTE - NSFALLRSKINDICTYPE_ED_ALL_ED
Other (Free Text): Patient here for refills of his topicals. Ketoconazole shampoo wash scalp every other day Note Text (......Xxx Chief Complaint.): This diagnosis correlates with the Detail Level: Zone Need for Mobility Assisted Device/Impaired Gait

## 2020-01-09 NOTE — ED PROVIDER NOTE - CONDUCTED A DETAILED DISCUSSION WITH PATIENT AND/OR GUARDIAN REGARDING, MDM
I have reviewed the provider's instructions with the patient, answering all questions to her satisfaction.
radiology results

## 2020-08-19 NOTE — ED ADULT NURSE NOTE - NS ED NURSE DISCH DISPOSITION
[Initial Consultation] : an initial consultation [Other: _____] : [unfilled] [FreeTextEntry2] : Recurrent Endometrial Cancer  Discharged

## 2021-01-25 NOTE — PATIENT PROFILE ADULT. - NS PRO PT RIGHT SUPPORT PERSON
Radha is a 64 year old who is being evaluated via a billable telephone visit.      What phone number would you like to be contacted at? [facility]  How would you like to obtain your AVS? Mail a copy  Assessment & Plan     HSV-2 (herpes simplex virus 2) infection    - valACYclovir (VALTREX) 500 MG tablet; Take 1 tablet (500 mg) by mouth daily    Leg swelling  We agree to get her in in person  She does live in assisted living so is getting medical attention at home in meantime    Fredy Merritt MD  [facility]     Radha is a 64 year old who presents to clinic today for the following health issues }    HPI Several weeks of leg swelling. Both. No trauma. New. Not painful. No resp complaints. Seen in [facility], note rvwd, they suggested er, she would instead like in person pcc visit. She has photo on smart phone to show me but cannot use her mychart so I cannot see it. Staying about the same.  Past Medical History:   Diagnosis Date     Anemia      Basal cell carcinoma     Left-sided, skin over over medial orbit/nasal bone. Removed Oct 2018.     Benzodiazepine dependence (H)     With h/o withdrawal seizure x 1     Bipolar 1 disorder, mixed, moderate (H) 2/7/2013     Chronic pain     Back, legs.     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     Epidural abscess 2005    x4     Fibromyalgia      Generalised anxiety disorder      GERD (gastroesophageal reflux disease)      Major depressive disorder      Migraine      Nephrolithiasis     S/p left sided lithotripsy     Opiate dependence (H)      Osteoarthritis      Osteoporosis      PUD (peptic ulcer disease)      SLE (systemic lupus erythematosus) (H) 2009     Weight loss      Past Surgical History:   Procedure Laterality Date     BACK SURGERY  7-16-04    L4-5 epidural abscess     BACK SURGERY  8-4-04    L3-4 spinal stenosis     BACK SURGERY  11-4-04    Lt psoas abscess     BRONCHOSCOPY FLEXIBLE N/A 5/7/2019    Procedure:  Flexible Bronchoscopy;  Surgeon: Patrice Regalado MD;  Location: [facility] OR      SECTION      x 2     CHOLECYSTECTOMY  -     CLOSED REDUCTION, PERCUTANEOUS PINNING FINGER, COMBINED  8/10/2011    Procedure:COMBINED CLOSED REDUCTION, PERCUTANEOUS PINNING FINGER; 5th Proximal Phalanx; Surgeon:RADHA BUITRAGO; Location:[facility]     COLONOSCOPY       COLONOSCOPY N/A 2020    Procedure: COLONOSCOPY;  Surgeon: Zacarias Duran MD;  Location: [facility]     ESOPHAGOSCOPY, GASTROSCOPY, DUODENOSCOPY (EGD), COMBINED N/A 2020    Procedure: ESOPHAGOGASTRODUODENOSCOPY, WITH BIOPSY;  Surgeon: Zacarias Duran MD;  Location: [facility]     GASTRECTOMY  2005    Bilat truncal vagotomy, hemigastrectomy, RnY gastrojejunostomy     GASTROJEJUNOSTOMY  2011    Procedure:GASTROJEJUNOSTOMY; exploratory laparotmy with revision of gastrojejunostomy, Antrectomy, Miles-en-y, Gastrojejunostomy; Surgeon:JULIUS HELM; Location:[facility] OR     INSERT CHEST TUBE N/A 2019    Procedure: INSERTION, CATHETER, INTERCOSTAL, FOR DRAINAGE;  Surgeon: Melissa Nichols MD;  Location: [facility]     LASER HOLMIUM LITHOTRIPSY URETER(S), INSERT STENT, COMBINED      Procedure: COMBINED CYSTOSCOPY, URETEROSCOPY, LASER HOLMIUM LITHOTRIPSY URETER(S), INSERT STENT;;  Surgeon: Blair Correa MD;  Location: [facility] OR     LASER HOLMIUM NEPHROLITHOTOMY VIA PERCUTANEOUS NEPHROSTOMY  2012    Procedure: LASER HOLMIUM NEPHROLITHOTOMY VIA PERCUTANEOUS NEPHROSTOMY;  proceedure should read: Left Percutaneous Access, Left Percutaneous ultrasonic Nephrolithotomy, Ureteroscopy Holmium Laser Lithotripsy Stent Placement ;  Surgeon: Blair Correa MD;  Location: [facility] OR     MAMMOPLASTY AUGMENTATION BILATERAL       OPEN REDUCTION INTERNAL FIXATION WRIST Left 2016    Procedure: OPEN REDUCTION INTERNAL FIXATION WRIST;  Surgeon: Darling Ellis MD;  Location: [facility]     RECONSTRUCT BREAST, IMPLANT PROSTHESIS, COMBINED        THORACOSCOPIC DECORTICATION LUNG Left 5/7/2019    Procedure: Left Video Assisted Thoracoscopic Decortication, Intercostal Nerve Cryo Analgesia, Flexible Bronchoscopy;  Surgeon: Patrice Regalado MD;  Location: [facility]     Current Outpatient Medications   Medication     escitalopram (LEXAPRO) 20 MG tablet     folic acid (FOLVITE) 1 MG tablet     hydroxychloroquine (PLAQUENIL) 200 MG tablet     hydrOXYzine (VISTARIL) 25 MG capsule     ibuprofen (ADVIL/MOTRIN) 200 MG tablet     methadone (DOLOPHINE-INTENSOL) 10 MG/ML (HIGH CONC) solution     pantoprazole (PROTONIX) 40 MG EC tablet     polyethylene glycol (MIRALAX) 17 GM/Dose powder     pregabalin (LYRICA) 150 MG capsule     valACYclovir (VALTREX) 500 MG tablet     valACYclovir (VALTREX) 500 MG tablet     butalbital-acetaminophen-caffeine (ESGIC) -40 MG tablet     naloxone (NARCAN) 4 MG/0.1ML nasal spray     No current facility-administered medications for this visit.      Allergies   Allergen Reactions     Penicillins Hives     Hives in childhood.     Also chronic gential hsv. Never did suppressive therapy but we've discussed in past, now she'd like to, outbreak every month or two.        Review of Systems         Objective           Vitals:  No vitals were obtained today due to virtual visit.    Physical Exam   healthy, alert and no distress  PSYCH: Alert and oriented times 3; coherent speech, normal   rate and volume, able to articulate logical thoughts, able   to abstract reason, no tangential thoughts, no hallucinations   or delusions  Her affect is normal  RESP: No cough, no audible wheezing, able to talk in full sentences  Remainder of exam unable to be completed due to telephone visits            Phone call duration: 12 minutes     Declines

## 2021-06-04 NOTE — PATIENT PROFILE ADULT. - NS PRO ABUSE SCREEN SUSPICION NEGLECT YN
no

## 2021-09-09 NOTE — ED ADULT NURSE NOTE - TEMPLATE LIST FOR HEAD TO TOE ASSESSMENT
PRE-SEDATION ASSESSMENT    CONSENT  Risks, benefits, and alternatives have been discussed with the patient/patient representative, and patient/patient representative agrees to proceed: Yes    MEDICAL HISTORY  Significant medical/surgical history: Yes  Past Complications with Sedation/Anesthesia: No  Significant Family History: Yes  Smoking History: Yes (current)  Alcohol/Drug abuse: No  Possible Pregnancy (LMP): No  Cardiac History: No  Respiratory History: Yes (newly diagnosed CAITLYN)    PHYSICAL EXAM  History and Physical Reviewed: Patient has valid H&P within 30 days. I have reviewed and there are no changes.  Airway Risk History: No previous complications;Snoring;Sleep apnea  Airway Anatomy : Class III  Heart : Normal  Lungs : Normal  LOC/Mental Status : Normal    OTHER FINDINGS  Reviewed current medications and allergies: Yes  Pertinent lab/diagnostic test reviewed: Yes    SEDATION RISK ASSESSMENT  Risk Status ASA: Class II - Normal patient with mild systemic disease  Plan for Sedation: Moderate Sedation  Indications for Procedure/Pre-Procedure Diagnosis and Planned Procedure: chest pain with syncope, Mild global LV systolic dysfunction.  EKG Monitoring: Yes    NARRATIVE FINDINGS     
General

## 2021-09-27 NOTE — ED ADULT NURSE NOTE - ALCOHOL PRE SCREEN (AUDIT - C)
Statement Selected 36-year-old female admitted from [facility] after medical admission for seizure workup, what mother recognized as seizure activity more likely catatonia, cleared by neuro.  Patient's reported sx with associated history point towards MDD w/ psychosis vs bipolar depression w/ psychosis or schizoaffective as possible etiology. She was initially guarded with FTD, vague speech, concrete thought process, loosening of associations. Doing well on Effexor, Abilify, and Klonopin with improved mood, fewer psychotic symptoms.      - Abilify 15mg daily  - effexor 300mg daily  - Klonopin 1mg qAM and q2pm   - trazodone 50mg qHS PRN for insomnia  - PHP start date Thursday 9/30

## 2021-10-19 NOTE — PATIENT PROFILE ADULT - LIVES WITH
[address]  Breast Navigator Encounter    Name:    Kenan Jacob  Age:    76 y.o. Diagnosis:    RIGHT breast cancer    Interdisciplinary Team:  Med-Onc:    Surg-Onc:    Dr. Santana Chavarria:    Plastics:    :     Nurse Navigator:  Mikaela Olivares, RN, BSN, [facility]      Encounter type:  []Patient Initiated  []Navigator Follow-up []Pre-op  []Post-op  []Check-in Prior to First Treatment []Treatment Modality Change  []Survivorship Transition [x]Other:    Initial Navigator Encounter    Narrative:   Called patient for initial navigator contact. I explained what happens at the first consultation - an exam by the physician, a possible ultrasound, then complete discussion of surgical options and other treatment that may be considered. I encouraged the patient to bring  someone with her to this appointment since there is a lot of information that will be covered. She plans to bring her . Discussed that Dr. Rojas Cortez may discuss doing a breast MRI when she comes in for the visit if she feels like this is a necessary step in her work-up. She did not feel the lump; it was seen on her screening mammogram.  Did not have much bruising after the biopsy, and she has already removed the dressing and steri-strips. Confirmed the address of the office. She did not have any further questions for me. Provided the patient with my contact information and discussed my role in her care. Will continue to follow patient throughout  the care continuum.                Mikaela Olivares RN, BSN, [facility]  Oncology Breast Navigator     [address]  [address] [facility] [address]  W: [phone]  F: [phone]  [email or web address]  Good Help to Those in [facility] spouse

## 2022-08-04 NOTE — PATIENT PROFILE ADULT. - LOCATION
Skin Checks Recommendations: Q 6 months Detail Level: Detailed When Should The Patient Follow-Up For Their Next Full-Body Skin Exam?: 6 Months Quality 137: Melanoma: Continuity Of Care - Recall System: Patient information entered into a recall system that includes: target date for the next exam specified AND a process to follow up with patients regarding missed or unscheduled appointments Detail Level: Generalized Detail Level: Zone right buttocks

## 2022-09-28 NOTE — ED ADULT NURSE NOTE - CHPI ED SYMPTOMS POS
MEDICATIONS:  See Medication List (bring to your doctor appointments)    VACCINES:  Most Recent Immunizations   Administered Date(s) Administered    COVID-19 12Y+ Pfizer-BioNtech - Requires Dilution 10/24/2021    Hep B, adult 03/22/2005    Hepatitis A - Adult 06/05/2019    Influenza, quadrivalent, MDCK, preserve-free (FLUCELVAX) 10/07/2019    Influenza, quadrivalent, multi-dose 10/27/2016    Influenza, quadrivalent, preserve-free 09/18/2020    Influenza, seasonal, injectable, trivalent 10/12/2021    Pneumococcal Polysaccharide Vacc (Pneumovax 23) 01/11/2022    Shingrix (Shingles Zoster) 06/24/2020    TD Adult, Adsorbed 05/03/2004    Td:Adult type tetanus/diphtheria 04/27/2006    Tdap 11/03/2016    Typhoid, Vi capsular polysaccharide vaccine 12/10/2018       ACTIVITY:  Weigh yourself daily (first thing in the morning, with same amount of clothes on) unless told otherwise by your doctor.  Continue activity as you were in the hospital, slowly increase to what you were doing previously.  Up as desired / no restrictions.    SMOKING:  Avoid all tobacco products and secondhand smoke.  Smoking Cessation Counseling offered.  [place] Toll Free Quit Line: [phone]    DIET:  Limit salt and salty foods unless told otherwise by your doctor.  No Restrictions    Trouble breathing or chest pain - CALL 911    CONTACT YOUR DOCTOR IF:  You have symptoms that are not \"normal\" for you.  You have new or worse symptoms or pain, not relieved by medicine or rest.  Temperature greater than 101 degrees F, chills or flu like symptoms.  You gain more than 3 pounds in 2 days.  Increased swelling, redness or drainage.        
COUGH/SHORTNESS OF BREATH

## 2022-10-16 NOTE — ED PROVIDER NOTE - GASTROINTESTINAL, MLM
Abdomen soft, non-tender, no guarding.
CONTINUE YOUR ANTIBIOTIC  RETURN FOR WOUND CHECK IN 2 DAYS  RETURN SOONER FOR ANY WORSENING SYMPTOMS      Viral Respiratory Infection      A respiratory infection is an illness that affects part of the respiratory system, such as the lungs, nose, or throat. A respiratory infection that is caused by a virus is called a viral respiratory infection.    Common types of viral respiratory infections include:  •A cold.      •The flu (influenza).      •A respiratory syncytial virus (RSV) infection.        What are the causes?    This condition is caused by a virus. The virus may spread through contact with droplets or direct contact with infected people or their mucus or secretions. The virus may spread from person to person (is contagious).      What are the signs or symptoms?    Symptoms of this condition include:  •A stuffy or runny nose.      •A sore throat or cough.      •Shortness of breath or difficulty breathing.      •Yellow or green mucus (sputum).      Other symptoms may include:  •A fever.      •Sweating or chills.      •Fatigue.      •Achy muscles.      •A headache.        How is this diagnosed?    This condition may be diagnosed based on:  •Your symptoms.      •A physical exam.      •Testing of secretions from the nose or throat.      •Chest X-ray.        How is this treated?    This condition may be treated with medicines, such as:  •Antiviral medicine. This may shorten the length of time a person has symptoms.      •Expectorants. These make it easier to cough up mucus.      •Decongestant nasal sprays.      •Acetaminophen or NSAIDs, such as ibuprofen, to relieve fever and pain.      Antibiotic medicines are not prescribed for viral infections.This is because antibiotics are designed to kill bacteria. They do not kill viruses.      Follow these instructions at home:    Managing pain and congestion     •Take over-the-counter and prescription medicines only as told by your health care provider.      •If you have a sore throat, gargle with a mixture of salt and water 3–4 times a day or as needed. To make salt water, completely dissolve ½–1 tsp (3–6 g) of salt in 1 cup (237 mL) of warm water.      •Use nose drops made from salt water to ease congestion and soften raw skin around your nose.    •Take 2 tsp (10 mL) of honey at bedtime to lessen coughing at night.  •Do not give honey to children who are younger than 1 year.        •Drink enough fluid to keep your urine pale yellow. This helps prevent dehydration and helps loosen up mucus.        General instructions   A sign telling the reader not to smoke.   •Rest as much as possible.      • Do not drink alcohol.      • Do not use any products that contain nicotine or tobacco. These products include cigarettes, chewing tobacco, and vaping devices, such as e-cigarettes. If you need help quitting, ask your health care provider.      •Keep all follow-up visits. This is important.        How is this prevented?      Washing hands with soap and water.       A person covering her mouth and nose with a cloth while sneezing.     •Get an annual flu shot. You may get the flu shot in late summer, fall, or winter. Ask your health care provider when you should get your flu shot.    •Avoid spreading your infection to other people. If you are sick:  •Wash your hands with soap and water often, especially after you cough or sneeze. Wash for at least 20 seconds. If soap and water are not available, use alcohol-based hand .      •Cover your mouth when you cough. Cover your nose and mouth when you sneeze.      •Do not share cups or eating utensils.      •Clean commonly used objects often. Clean commonly touched surfaces.      •Stay home from work or school as told by your health care provider.        •Avoid contact with people who are sick during cold and flu season. This is generally fall and winter.        Contact a health care provider if:    •Your symptoms last for 10 days or longer.      •Your symptoms get worse over time.      •You have severe sinus pain in your face or forehead.      •The glands in your jaw or neck become very swollen.      •You have shortness of breath.        Get help right away if you:    •Feel pain or pressure in your chest.      •Have trouble breathing.      •Faint or feel like you will faint.      •Have severe and persistent vomiting.      •Feel confused or disoriented.      These symptoms may represent a serious problem that is an emergency. Do not wait to see if the symptoms will go away. Get medical help right away. Call your local emergency services (911 in the [place] Do not drive yourself to the hospital.       Summary    •A respiratory infection is an illness that affects part of the respiratory system, such as the lungs, nose, or throat. A respiratory infection that is caused by a virus is called a viral respiratory infection.      •Common types of viral respiratory infections include a cold, influenza, and respiratory syncytial virus (RSV) infection.      •Symptoms of this condition include a stuffy or runny nose, cough, fatigue, achy muscles, sore throat, and fevers or chills.      •Antibiotic medicines are not prescribed for viral infections. This is because antibiotics are designed to kill bacteria. They are not effective against viruses.      This information is not intended to replace advice given to you by your health care provider. Make sure you discuss any questions you have with your health care provider.        Skin Abscess       A skin abscess is an infected area on or under your skin that contains a collection of pus and other material. An abscess may also be called a furuncle, carbuncle, or boil. An abscess can occur in or on almost any part of your body.    Some abscesses break open (rupture) on their own. Most continue to get worse unless they are treated. The infection can spread deeper into the body and eventually into your blood, which can make you feel ill. Treatment usually involves draining the abscess.      What are the causes?    An abscess occurs when germs, like bacteria, pass through your skin and cause an infection. This may be caused by:  •A scrape or cut on your skin.      •A puncture wound through your skin, including a needle injection or insect bite.      •Blocked oil or sweat glands.      •Blocked and infected hair follicles.      •A cyst that forms beneath your skin (sebaceous cyst) and becomes infected.        What increases the risk?    This condition is more likely to develop in people who:  •Have a weak body defense system (immune system).      •Have diabetes.      •Have dry and irritated skin.      •Get frequent injections or use illegal IV drugs.      •Have a foreign body in a wound, such as a splinter.      •Have problems with their lymph system or veins.        What are the signs or symptoms?    Symptoms of this condition include:  •A painful, firm bump under the skin.      •A bump with pus at the top. This may break through the skin and drain.      Other symptoms include:  •Redness surrounding the abscess site.      •Warmth.      •Swelling of the lymph nodes (glands) near the abscess.      •Tenderness.      •A sore on the skin.        How is this diagnosed?    This condition may be diagnosed based on:  •A physical exam.      •Your medical history.      •A sample of pus. This may be used to find out what is causing the infection.      •Blood tests.      •Imaging tests, such as an ultrasound, CT scan, or MRI.        How is this treated?    A small abscess that drains on its own may not need treatment. Treatment for larger abscesses may include:  •Moist heat or heat pack applied to the area several times a day.      •A procedure to drain the abscess (incision and drainage).      •Antibiotic medicines. For a severe abscess, you may first get antibiotics through an IV and then change to antibiotics by mouth.        Follow these instructions at home:      Medicines      •Take over-the-counter and prescription medicines only as told by your health care provider.      •If you were prescribed an antibiotic medicine, take it as told by your health care provider. Do not stop taking the antibiotic even if you start to feel better.        Abscess care    •If you have an abscess that has not drained, apply heat to the affected area. Use the heat source that your health care provider recommends, such as a moist heat pack or a heating pad.  •Place a towel between your skin and the heat source.      •Leave the heat on for 20–30 minutes.      •Remove the heat if your skin turns bright red. This is especially important if you are unable to feel pain, heat, or cold. You may have a greater risk of getting burned.      •Follow instructions from your health care provider about how to take care of your abscess. Make sure you:  •Cover the abscess with a bandage (dressing).      •Change your dressing or gauze as told by your health care provider.      •Wash your hands with soap and water before you change the dressing or gauze. If soap and water are not available, use hand .      •Check your abscess every day for signs of a worsening infection. Check for:  •More redness, swelling, or pain.      •More fluid or blood.      •Warmth.      •More pus or a bad smell.        General instructions   •To avoid spreading the infection:  •Do not share personal care items, towels, or hot tubs with others.      •Avoid making skin contact with other people.        •Keep all follow-up visits as told by your health care provider. This is important.        Contact a health care provider if you have:    •More redness, swelling, or pain around your abscess.      •More fluid or blood coming from your abscess.      •Warm skin around your abscess.      •More pus or a bad smell coming from your abscess.      •A fever.      •Muscle aches.      •Chills or a general ill feeling.        Get help right away if you:    •Have severe pain.      •See red streaks on your skin spreading away from the abscess.        Summary    •A skin abscess is an infected area on or under your skin that contains a collection of pus and other material.      •A small abscess that drains on its own may not need treatment.      •Treatment for larger abscesses may include having a procedure to drain the abscess and taking an antibiotic.      This information is not intended to replace advice given to you by your health care provider. Make sure you discuss any questions you have with your health care provider.

## 2022-10-28 NOTE — ED ADULT NURSE NOTE - CAS TRG GEN SKIN COLOR
Quality 226: Preventive Care And Screening: Tobacco Use: Screening And Cessation Intervention: Patient screened for tobacco use and is an ex/non-smoker Quality 130: Documentation Of Current Medications In The Medical Record: Current Medications Documented Detail Level: Detailed Quality 431: Preventive Care And Screening: Unhealthy Alcohol Use - Screening: Patient not identified as an unhealthy alcohol user when screened for unhealthy alcohol use using a systematic screening method Redness/swelling L side face ear and scalp/Normal for race

## 2022-12-13 NOTE — DISCHARGE NOTE NURSING/CASE MANAGEMENT/SOCIAL WORK - NSDCPEPT PROEDMA_GEN_ALL_CORE
Interventional Radiology Focus Note    Left voicemail requesting call back to discuss Edmond's questions.    Genia Baumann PA-C   No

## 2023-01-23 NOTE — ED ADULT NURSE NOTE - NSFALLRSKASSESSDT_ED_ALL_ED
Detail Level: Zone Samples Given: Cerave or Cetaphil moisturizer 11-Apr-2019 18:02 Initiate Treatment: Triamcinolone 0.1% cream

## 2023-06-08 NOTE — PATIENT PROFILE ADULT - NSTRANSFERBELONGINGSRESP_GEN_A_NUR
Left voicemail for patient to call back to make mammogram appointment   Call Me back at [phone]  My name is Sparkle Perez  call Central Scheduling [phone]
yes

## 2023-08-02 NOTE — ED ADULT NURSE NOTE - MODE OF DISCHARGE
47 y/o Obese female, current  smoker (1ppd X>10yrs ) with  PMHx of non compliance with medication, ETOH abuse, iron deficiency anemia (hx of prior blood transfusions), HTN, DM, HLD, cholelithiasis, gastric bypass surgery (1998), , abdominal myoplasty (2018) presents to complaining of substernal chest pressure, epigastric pressure associated with belching and mild BURNETT when ambulating  two  blocks. Pt endorses b/l leg and ankle swelling, worse on R side. EKG 8/2/23 showed NSR at 80 with QTC of 470ms, Trop T neg x1. Pt is admitted to cardiac telemetry unit for further cardiac ischemic workup with TTE and CCTA.     45 y/o Obese female, current  smoker (1ppd X>10yrs ) with  PMHx of non compliance with medication, ETOH abuse, iron deficiency anemia (hx of prior blood transfusions), HTN, DM, HLD, cholelithiasis, gastric bypass surgery (1998), , abdominal myoplasty (2018) presents to complaining of substernal chest pressure, epigastric pressure associated with belching and mild BURNETT when ambulating  two  blocks. Pt endorses b/l leg and ankle swelling, worse on R side (CT PE protocol negative, B/L LUE duplex negative for DVT). EKG 8/2/23 showed NSR at 80 with QTC of 470ms, Trop T neg x1. Pt is admitted to cardiac telemetry unit for further cardiac ischemic workup with TTE and CCTA.     Stretcher

## 2023-08-09 NOTE — CONSULT NOTE ADULT - MINUTES
Plan:    Check out [facility] online for gentle exercise  Physical Therapy:  Referral to PT  Clinical Health Psychologist: continue work with your psychiatrist and therapist  Diagnostic Studies:  None  Medication Management:    INCREASE Subutex 8mg  three times per day starting today. Next script to be filled on 8/23 and start 8/26  Continue medical cannabis   continue tizanidine 4mg three times daily as needed for muscle spasms  Further procedures recommended: none  Recommendations to PCP: see above   Follow up:12 weeks in-person/video. Please call [phone] to make your follow-up appointment with me.     ----------------------------------------------------------------  Clinic Number:  [phone]   Call with any questions about your care and for scheduling assistance.   Calls are returned Monday through Friday between 8 AM and 4:30 PM. We usually get back to you within 2 business days depending on the issue/request.    If we are prescribing your medications:  For opioid medication refills, call the clinic or send a [employer] message 7 days in advance.  Please include:  Name of requested medication  Name of the pharmacy.  For non-opioid medications, call your pharmacy directly to request a refill. Please allow 3-4 days to be processed.   Per [place] State Law:  All controlled substance prescriptions must be filled within 30 days of being written.    For those controlled substances allowing refills, pickup must occur within 30 days of last fill.      We believe regular attendance is key to your success in our program!    Any time you are unable to keep your appointment we ask that you call us at least 24 hours in advance to cancel.This will allow us to offer the appointment time to another patient.   Multiple missed appointments may lead to dismissal from the clinic.   
75

## 2023-08-10 NOTE — ED PROVIDER NOTE - CPE EDP HEAD NORM PED
Patient Education        Depression After Childbirth: Care Instructions  It's common to lose sleep, feel irritable, and cry easily during the first few days after childbirth. Hormone changes and the demands of a new baby can cause these \"baby blues. \" If these mood changes last more than 2 weeks, you may have postpartum depression. This is a medical condition that requires treatment. If you have any of these signs, you may be depressed. See your doctor right away. You feel very sad or hopeless and lose interest in daily activities. You sleep too much or not enough. You feel tired or as if you have no energy. You eat too much or too little. You write or talk about death. Where to get help 24 hours a day, 7 days a week If you or someone you know talks about suicide, self-harm, a mental health crisis, a substance use crisis, or any other kind of emotional distress, get help right away. You can:  Consider saving these numbers in your phone. Call the Suicide and [address] at 65. Call 9-637-772-TALK (2-963.878.1746). Text HOME to 738033 to access the Crisis Text Line. What you can do    Try to go to all of your counseling sessions. Take medicines as directed. Eat healthy foods. Get daily exercise, such as walks. Try to get some sunlight every day. Avoid using alcohol or other substances. Get as much rest as possible. Connect with friends, and join a support group for new parents. When should you call for help? Call 402 if: You feel you cannot stop from hurting yourself, your baby, or someone else. Call your doctor now or seek immediate medical care if:    You are having trouble caring for yourself or your baby. You hear voices. Contact your doctor if:    You have problems with your medicines. You do not get better as expected. Follow-up care is a key part of your treatment and safety.  Be sure to make and go to all appointments, and call your doctor if
Head atraumatic, normal cephalic shape.

## 2024-05-10 NOTE — ED PROVIDER NOTE - NS ED MD DISPO ISOLATION TYPES
Hypoxic to 88%, requires 2L NC likely 2/2 PE and/or aspiration.   CTA w/ PE in R middle lobe pulm artery and density in the posterior trachea and the   left mainstem bronchus   Has been on Eliquis for L LE popliteal DVT, unable to verify compliance at this time   Unclear if PE is new vs chronic, as patient has been on Eliquis since [facility] admission. Hypoxia could be explained by aspiration alone  Pending further collateral of Eliquis compliance from [facility], transitioned Eliquis to heparin gtt    -> Will treat for aspiration PNA with cefepime  -> Obtain MRSA swab (COVID and flu negative)  -> c.w hep gtt for now  -> TTE for heart strain eval, b/l   -> NPO pending dysphagia eval None

## 2024-05-28 NOTE — DISCHARGE NOTE NURSING/CASE MANAGEMENT/SOCIAL WORK - NSDPLANG ASIS_GEN_ALL_CORE
Medication:   Requested Prescriptions     Pending Prescriptions Disp Refills    HYDROcodone-acetaminophen (NORCO) 7.5-325 MG per tablet 120 tablet 0     Sig: Take 1 tablet by mouth every 6 hours as needed for Pain for up to 30 days.        Last Filled:  04/29/2024, 120, 0    Patient Phone Number: [phone] (home)     Last appt: 3/21/2024   Next appt: 5/31/2024    Last OARRS:       3/21/2024     3:56 PM   RX Monitoring   Periodic Controlled Substance Monitoring No signs of potential drug abuse or diversion identified.            No

## 2024-07-08 NOTE — ED ADULT TRIAGE NOTE - BANDS:
Fall Risk; F: s/p 1L NS   E: replete as needed  N: regular diet  DVT ppx: lovenox  Dispo: RMF, pending PT

## 2025-05-09 NOTE — PATIENT PROFILE ADULT - NSPROPTRIGHTREPPHONE_GEN_A_NUR
Wt Readings from Last 3 Encounters:   04/28/25 97.4 kg (214 lb 12.8 oz)   04/21/25 96.6 kg (213 lb)   04/02/25 100 kg (221 lb 1.9 oz)   Patient gaining weight slowly likely from prednisone taper.  No signs or symptoms of fluid overload noted.  No increase in edema to bilateral lower extremities  Continue Lasix 40 mg daily and Toprol 50 mg twice daily  Continue CHF pathway/daily weights  Follow-up with cardiology outpatient           [phone]

## 2025-05-14 NOTE — ED PROVIDER NOTE - CARE PLAN
Hospital chart (includes HIE) Principal Discharge DX:	Exertional chest pain  Secondary Diagnosis:	Exertional dyspnea

## 2025-06-11 NOTE — PROCEDURE NOTE - INTERROGATION NOTE: RV LEAD IMPEDANCE (OHMS)
titration of therapy.      - EKG 12 lead    8. Mixed hyperlipidemia  Continue Lipitor.  - atorvastatin (LIPITOR) 40 MG tablet; Take 1 tablet by mouth daily  Dispense: 90 tablet; Refill: 3             Return in about 6 months (around 12/11/2025).       Barry Carrera MD  6/11/2025  11:50 AM      *Portions of the record have been created with voice recognition software. Occasional wrong-word or 'sound-a-like' substitutions may have occurred due to the inherent limitations of voice recognition software. Read the chart carefully and recognize, using context, where substitutions have occurred.   
447